# Patient Record
Sex: FEMALE | Race: WHITE | NOT HISPANIC OR LATINO | Employment: OTHER | URBAN - METROPOLITAN AREA
[De-identification: names, ages, dates, MRNs, and addresses within clinical notes are randomized per-mention and may not be internally consistent; named-entity substitution may affect disease eponyms.]

---

## 2017-01-03 ENCOUNTER — COMMUNICATION - HEALTHEAST (OUTPATIENT)
Dept: RADIOLOGY | Facility: CLINIC | Age: 82
End: 2017-01-03

## 2017-01-03 DIAGNOSIS — I10 UNSPECIFIED ESSENTIAL HYPERTENSION: ICD-10-CM

## 2017-01-27 ENCOUNTER — RECORDS - HEALTHEAST (OUTPATIENT)
Dept: ADMINISTRATIVE | Facility: OTHER | Age: 82
End: 2017-01-27

## 2017-03-13 ENCOUNTER — COMMUNICATION - HEALTHEAST (OUTPATIENT)
Dept: INTERNAL MEDICINE | Facility: CLINIC | Age: 82
End: 2017-03-13

## 2017-03-20 ENCOUNTER — OFFICE VISIT - HEALTHEAST (OUTPATIENT)
Dept: INTERNAL MEDICINE | Facility: CLINIC | Age: 82
End: 2017-03-20

## 2017-03-20 DIAGNOSIS — Z01.818 PREOPERATIVE EXAMINATION: ICD-10-CM

## 2017-03-20 DIAGNOSIS — E11.9 TYPE 2 DIABETES MELLITUS (H): ICD-10-CM

## 2017-03-20 DIAGNOSIS — M19.012 OSTEOARTHRITIS OF LEFT SHOULDER, UNSPECIFIED OSTEOARTHRITIS TYPE: ICD-10-CM

## 2017-03-20 LAB — HBA1C MFR BLD: 8.1 % (ref 3.5–6)

## 2017-03-20 ASSESSMENT — MIFFLIN-ST. JEOR: SCORE: 888.61

## 2017-03-21 LAB
ATRIAL RATE - MUSE: 66 BPM
DIASTOLIC BLOOD PRESSURE - MUSE: NORMAL MMHG
INTERPRETATION ECG - MUSE: NORMAL
P AXIS - MUSE: 73 DEGREES
PR INTERVAL - MUSE: 158 MS
QRS DURATION - MUSE: 80 MS
QT - MUSE: 390 MS
QTC - MUSE: 405 MS
R AXIS - MUSE: 73 DEGREES
SYSTOLIC BLOOD PRESSURE - MUSE: NORMAL MMHG
T AXIS - MUSE: 79 DEGREES
VENTRICULAR RATE- MUSE: 65 BPM

## 2017-04-10 ENCOUNTER — OFFICE VISIT - HEALTHEAST (OUTPATIENT)
Dept: INTERNAL MEDICINE | Facility: CLINIC | Age: 82
End: 2017-04-10

## 2017-04-10 DIAGNOSIS — E87.1 HYPONATREMIA: ICD-10-CM

## 2017-04-10 DIAGNOSIS — D72.829 LEUKOCYTOSIS: ICD-10-CM

## 2017-04-10 DIAGNOSIS — Z96.612 S/P REVERSE TOTAL SHOULDER ARTHROPLASTY, LEFT: ICD-10-CM

## 2017-04-10 DIAGNOSIS — R63.0 LOSS OF APPETITE: ICD-10-CM

## 2017-04-10 DIAGNOSIS — R53.83 FATIGUE: ICD-10-CM

## 2017-04-10 DIAGNOSIS — D64.9 ANEMIA: ICD-10-CM

## 2017-04-11 ENCOUNTER — COMMUNICATION - HEALTHEAST (OUTPATIENT)
Dept: INTERNAL MEDICINE | Facility: CLINIC | Age: 82
End: 2017-04-11

## 2017-04-15 ENCOUNTER — HOME CARE/HOSPICE - HEALTHEAST (OUTPATIENT)
Dept: HOME HEALTH SERVICES | Facility: HOME HEALTH | Age: 82
End: 2017-04-15

## 2017-04-16 ENCOUNTER — COMMUNICATION - HEALTHEAST (OUTPATIENT)
Dept: INTERNAL MEDICINE | Facility: CLINIC | Age: 82
End: 2017-04-16

## 2017-04-17 ENCOUNTER — HOME CARE/HOSPICE - HEALTHEAST (OUTPATIENT)
Dept: HOME HEALTH SERVICES | Facility: HOME HEALTH | Age: 82
End: 2017-04-17

## 2017-04-18 ENCOUNTER — OFFICE VISIT - HEALTHEAST (OUTPATIENT)
Dept: INTERNAL MEDICINE | Facility: CLINIC | Age: 82
End: 2017-04-18

## 2017-04-18 ENCOUNTER — HOME CARE/HOSPICE - HEALTHEAST (OUTPATIENT)
Dept: HOME HEALTH SERVICES | Facility: HOME HEALTH | Age: 82
End: 2017-04-18

## 2017-04-18 ENCOUNTER — COMMUNICATION - HEALTHEAST (OUTPATIENT)
Dept: HOME HEALTH SERVICES | Facility: HOME HEALTH | Age: 82
End: 2017-04-18

## 2017-04-18 DIAGNOSIS — D72.829 LEUKOCYTOSIS: ICD-10-CM

## 2017-04-18 DIAGNOSIS — E87.1 HYPONATREMIA: ICD-10-CM

## 2017-04-20 ENCOUNTER — HOME CARE/HOSPICE - HEALTHEAST (OUTPATIENT)
Dept: HOME HEALTH SERVICES | Facility: HOME HEALTH | Age: 82
End: 2017-04-20

## 2017-04-22 ENCOUNTER — HOME CARE/HOSPICE - HEALTHEAST (OUTPATIENT)
Dept: HOME HEALTH SERVICES | Facility: HOME HEALTH | Age: 82
End: 2017-04-22

## 2017-04-24 ENCOUNTER — HOME CARE/HOSPICE - HEALTHEAST (OUTPATIENT)
Dept: HOME HEALTH SERVICES | Facility: HOME HEALTH | Age: 82
End: 2017-04-24

## 2017-04-25 ENCOUNTER — HOME CARE/HOSPICE - HEALTHEAST (OUTPATIENT)
Dept: HOME HEALTH SERVICES | Facility: HOME HEALTH | Age: 82
End: 2017-04-25

## 2017-04-26 ENCOUNTER — HOME CARE/HOSPICE - HEALTHEAST (OUTPATIENT)
Dept: HOME HEALTH SERVICES | Facility: HOME HEALTH | Age: 82
End: 2017-04-26

## 2017-04-26 ENCOUNTER — OFFICE VISIT - HEALTHEAST (OUTPATIENT)
Dept: INTERNAL MEDICINE | Facility: CLINIC | Age: 82
End: 2017-04-26

## 2017-04-26 DIAGNOSIS — D72.829 LEUKOCYTOSIS: ICD-10-CM

## 2017-04-26 DIAGNOSIS — E87.1 HYPONATREMIA: ICD-10-CM

## 2017-04-27 LAB
BASOPHILS # BLD AUTO: 0 THOU/UL (ref 0–0.2)
BASOPHILS NFR BLD AUTO: 0 % (ref 0–2)
EOSINOPHIL # BLD AUTO: 0.2 THOU/UL (ref 0–0.4)
EOSINOPHIL NFR BLD AUTO: 2 % (ref 0–6)
ERYTHROCYTE [DISTWIDTH] IN BLOOD BY AUTOMATED COUNT: 13.6 % (ref 11–14.5)
HCT VFR BLD AUTO: 34.6 % (ref 35–47)
HGB BLD-MCNC: 11.4 G/DL (ref 12–16)
LAB AP CHARGES (HE HISTORICAL CONVERSION): NORMAL
LYMPHOCYTES # BLD AUTO: 2.4 THOU/UL (ref 0.8–4.4)
LYMPHOCYTES NFR BLD AUTO: 21 % (ref 20–40)
MCH RBC QN AUTO: 32.2 PG (ref 27–34)
MCHC RBC AUTO-ENTMCNC: 32.9 G/DL (ref 32–36)
MCV RBC AUTO: 98 FL (ref 80–100)
MONOCYTES # BLD AUTO: 1.5 THOU/UL (ref 0–0.9)
MONOCYTES NFR BLD AUTO: 13 % (ref 2–10)
NEUTROPHILS # BLD AUTO: 7.5 THOU/UL (ref 2–7.7)
NEUTROPHILS NFR BLD AUTO: 64 % (ref 50–70)
PATH REPORT.COMMENTS IMP SPEC: NORMAL
PATH REPORT.COMMENTS IMP SPEC: NORMAL
PATH REPORT.FINAL DX SPEC: NORMAL
PATH REPORT.MICROSCOPIC SPEC OTHER STN: ABNORMAL
PATH REPORT.MICROSCOPIC SPEC OTHER STN: NORMAL
PATH REPORT.RELEVANT HX SPEC: NORMAL
PLATELET # BLD AUTO: 332 THOU/UL (ref 140–440)
PMV BLD AUTO: 12.7 FL (ref 8.5–12.5)
RBC # BLD AUTO: 3.54 MILL/UL (ref 3.8–5.4)
WBC: 11.8 THOU/UL (ref 4–11)

## 2017-04-28 ENCOUNTER — HOME CARE/HOSPICE - HEALTHEAST (OUTPATIENT)
Dept: HOME HEALTH SERVICES | Facility: HOME HEALTH | Age: 82
End: 2017-04-28

## 2017-05-01 ENCOUNTER — HOME CARE/HOSPICE - HEALTHEAST (OUTPATIENT)
Dept: HOME HEALTH SERVICES | Facility: HOME HEALTH | Age: 82
End: 2017-05-01

## 2017-05-02 ENCOUNTER — HOME CARE/HOSPICE - HEALTHEAST (OUTPATIENT)
Dept: HOME HEALTH SERVICES | Facility: HOME HEALTH | Age: 82
End: 2017-05-02

## 2017-05-03 ENCOUNTER — HOME CARE/HOSPICE - HEALTHEAST (OUTPATIENT)
Dept: HOME HEALTH SERVICES | Facility: HOME HEALTH | Age: 82
End: 2017-05-03

## 2017-05-03 ENCOUNTER — COMMUNICATION - HEALTHEAST (OUTPATIENT)
Dept: INTERNAL MEDICINE | Facility: CLINIC | Age: 82
End: 2017-05-03

## 2017-05-03 DIAGNOSIS — E11.9 TYPE 2 DIABETES MELLITUS (H): ICD-10-CM

## 2017-05-08 ENCOUNTER — HOME CARE/HOSPICE - HEALTHEAST (OUTPATIENT)
Dept: HOME HEALTH SERVICES | Facility: HOME HEALTH | Age: 82
End: 2017-05-08

## 2017-05-10 ENCOUNTER — HOME CARE/HOSPICE - HEALTHEAST (OUTPATIENT)
Dept: HOME HEALTH SERVICES | Facility: HOME HEALTH | Age: 82
End: 2017-05-10

## 2017-05-12 ENCOUNTER — HOME CARE/HOSPICE - HEALTHEAST (OUTPATIENT)
Dept: HOME HEALTH SERVICES | Facility: HOME HEALTH | Age: 82
End: 2017-05-12

## 2017-05-16 ENCOUNTER — HOME CARE/HOSPICE - HEALTHEAST (OUTPATIENT)
Dept: HOME HEALTH SERVICES | Facility: HOME HEALTH | Age: 82
End: 2017-05-16

## 2017-05-19 ENCOUNTER — RECORDS - HEALTHEAST (OUTPATIENT)
Dept: ADMINISTRATIVE | Facility: OTHER | Age: 82
End: 2017-05-19

## 2017-07-11 ENCOUNTER — OFFICE VISIT - HEALTHEAST (OUTPATIENT)
Dept: INTERNAL MEDICINE | Facility: CLINIC | Age: 82
End: 2017-07-11

## 2017-07-11 ENCOUNTER — COMMUNICATION - HEALTHEAST (OUTPATIENT)
Dept: TELEHEALTH | Facility: CLINIC | Age: 82
End: 2017-07-11

## 2017-07-11 DIAGNOSIS — E11.9 TYPE 2 DIABETES MELLITUS (H): ICD-10-CM

## 2017-07-11 DIAGNOSIS — R32 URINARY INCONTINENCE: ICD-10-CM

## 2017-07-11 LAB — HBA1C MFR BLD: 7 % (ref 3.5–6)

## 2017-07-13 ENCOUNTER — AMBULATORY - HEALTHEAST (OUTPATIENT)
Dept: INTERNAL MEDICINE | Facility: CLINIC | Age: 82
End: 2017-07-13

## 2017-07-13 ENCOUNTER — COMMUNICATION - HEALTHEAST (OUTPATIENT)
Dept: INTERNAL MEDICINE | Facility: CLINIC | Age: 82
End: 2017-07-13

## 2017-07-13 DIAGNOSIS — R26.81 UNSTEADY GAIT: ICD-10-CM

## 2017-07-25 ENCOUNTER — COMMUNICATION - HEALTHEAST (OUTPATIENT)
Dept: INTERNAL MEDICINE | Facility: CLINIC | Age: 82
End: 2017-07-25

## 2017-07-25 DIAGNOSIS — E78.2 MIXED HYPERLIPIDEMIA: ICD-10-CM

## 2017-08-16 ENCOUNTER — COMMUNICATION - HEALTHEAST (OUTPATIENT)
Dept: INTERNAL MEDICINE | Facility: CLINIC | Age: 82
End: 2017-08-16

## 2017-08-18 ENCOUNTER — COMMUNICATION - HEALTHEAST (OUTPATIENT)
Dept: INTERNAL MEDICINE | Facility: CLINIC | Age: 82
End: 2017-08-18

## 2017-08-20 ENCOUNTER — COMMUNICATION - HEALTHEAST (OUTPATIENT)
Dept: INTERNAL MEDICINE | Facility: CLINIC | Age: 82
End: 2017-08-20

## 2017-08-20 DIAGNOSIS — E11.9 TYPE 2 DIABETES MELLITUS (H): ICD-10-CM

## 2017-09-05 ENCOUNTER — COMMUNICATION - HEALTHEAST (OUTPATIENT)
Dept: INTERNAL MEDICINE | Facility: CLINIC | Age: 82
End: 2017-09-05

## 2017-09-05 DIAGNOSIS — E11.9 TYPE II DIABETES MELLITUS (H): ICD-10-CM

## 2017-09-21 ENCOUNTER — RECORDS - HEALTHEAST (OUTPATIENT)
Dept: ADMINISTRATIVE | Facility: OTHER | Age: 82
End: 2017-09-21

## 2017-10-29 ENCOUNTER — COMMUNICATION - HEALTHEAST (OUTPATIENT)
Dept: INTERNAL MEDICINE | Facility: CLINIC | Age: 82
End: 2017-10-29

## 2017-11-19 ENCOUNTER — COMMUNICATION - HEALTHEAST (OUTPATIENT)
Dept: INTERNAL MEDICINE | Facility: CLINIC | Age: 82
End: 2017-11-19

## 2017-11-19 DIAGNOSIS — E11.9 TYPE 2 DIABETES MELLITUS (H): ICD-10-CM

## 2017-11-21 ENCOUNTER — COMMUNICATION - HEALTHEAST (OUTPATIENT)
Dept: INTERNAL MEDICINE | Facility: CLINIC | Age: 82
End: 2017-11-21

## 2017-11-21 DIAGNOSIS — R32 URINARY INCONTINENCE: ICD-10-CM

## 2017-11-29 ENCOUNTER — AMBULATORY - HEALTHEAST (OUTPATIENT)
Dept: INTERNAL MEDICINE | Facility: CLINIC | Age: 82
End: 2017-11-29

## 2017-11-29 ENCOUNTER — OFFICE VISIT - HEALTHEAST (OUTPATIENT)
Dept: INTERNAL MEDICINE | Facility: CLINIC | Age: 82
End: 2017-11-29

## 2017-11-29 ENCOUNTER — COMMUNICATION - HEALTHEAST (OUTPATIENT)
Dept: INTERNAL MEDICINE | Facility: CLINIC | Age: 82
End: 2017-11-29

## 2017-11-29 DIAGNOSIS — R30.0 DYSURIA: ICD-10-CM

## 2017-11-29 DIAGNOSIS — R73.9 HYPERGLYCEMIA: ICD-10-CM

## 2017-11-29 LAB — HBA1C MFR BLD: 7.5 % (ref 3.5–6)

## 2017-12-01 ENCOUNTER — COMMUNICATION - HEALTHEAST (OUTPATIENT)
Dept: INTERNAL MEDICINE | Facility: CLINIC | Age: 82
End: 2017-12-01

## 2018-01-24 ENCOUNTER — COMMUNICATION - HEALTHEAST (OUTPATIENT)
Dept: INTERNAL MEDICINE | Facility: CLINIC | Age: 83
End: 2018-01-24

## 2018-01-24 DIAGNOSIS — E78.2 MIXED HYPERLIPIDEMIA: ICD-10-CM

## 2018-02-14 ENCOUNTER — COMMUNICATION - HEALTHEAST (OUTPATIENT)
Dept: INTERNAL MEDICINE | Facility: CLINIC | Age: 83
End: 2018-02-14

## 2018-02-14 DIAGNOSIS — E11.9 TYPE 2 DIABETES MELLITUS (H): ICD-10-CM

## 2018-04-26 ENCOUNTER — COMMUNICATION - HEALTHEAST (OUTPATIENT)
Dept: INTERNAL MEDICINE | Facility: CLINIC | Age: 83
End: 2018-04-26

## 2018-04-26 DIAGNOSIS — R32 URINARY INCONTINENCE: ICD-10-CM

## 2018-05-02 ENCOUNTER — COMMUNICATION - HEALTHEAST (OUTPATIENT)
Dept: INTERNAL MEDICINE | Facility: CLINIC | Age: 83
End: 2018-05-02

## 2018-05-02 DIAGNOSIS — I10 ESSENTIAL HYPERTENSION: ICD-10-CM

## 2018-05-02 DIAGNOSIS — E78.2 MIXED HYPERLIPIDEMIA: ICD-10-CM

## 2018-05-16 ENCOUNTER — COMMUNICATION - HEALTHEAST (OUTPATIENT)
Dept: INTERNAL MEDICINE | Facility: CLINIC | Age: 83
End: 2018-05-16

## 2018-05-16 DIAGNOSIS — E11.9 TYPE 2 DIABETES MELLITUS (H): ICD-10-CM

## 2018-05-17 ENCOUNTER — COMMUNICATION - HEALTHEAST (OUTPATIENT)
Dept: INTERNAL MEDICINE | Facility: CLINIC | Age: 83
End: 2018-05-17

## 2018-05-17 DIAGNOSIS — R32 URINARY INCONTINENCE: ICD-10-CM

## 2018-08-16 ENCOUNTER — COMMUNICATION - HEALTHEAST (OUTPATIENT)
Dept: INTERNAL MEDICINE | Facility: CLINIC | Age: 83
End: 2018-08-16

## 2018-08-16 DIAGNOSIS — E11.9 TYPE 2 DIABETES MELLITUS (H): ICD-10-CM

## 2018-08-18 ENCOUNTER — COMMUNICATION - HEALTHEAST (OUTPATIENT)
Dept: INTERNAL MEDICINE | Facility: CLINIC | Age: 83
End: 2018-08-18

## 2018-08-18 DIAGNOSIS — E11.9 TYPE II DIABETES MELLITUS (H): ICD-10-CM

## 2018-09-17 ENCOUNTER — COMMUNICATION - HEALTHEAST (OUTPATIENT)
Dept: INTERNAL MEDICINE | Facility: CLINIC | Age: 83
End: 2018-09-17

## 2018-09-17 DIAGNOSIS — E11.9 TYPE 2 DIABETES MELLITUS (H): ICD-10-CM

## 2018-10-02 ENCOUNTER — COMMUNICATION - HEALTHEAST (OUTPATIENT)
Dept: INTERNAL MEDICINE | Facility: CLINIC | Age: 83
End: 2018-10-02

## 2018-10-02 DIAGNOSIS — I10 ESSENTIAL HYPERTENSION: ICD-10-CM

## 2018-10-12 ENCOUNTER — COMMUNICATION - HEALTHEAST (OUTPATIENT)
Dept: INTERNAL MEDICINE | Facility: CLINIC | Age: 83
End: 2018-10-12

## 2018-10-12 DIAGNOSIS — R32 URINARY INCONTINENCE: ICD-10-CM

## 2018-10-29 ENCOUNTER — COMMUNICATION - HEALTHEAST (OUTPATIENT)
Dept: INTERNAL MEDICINE | Facility: CLINIC | Age: 83
End: 2018-10-29

## 2018-10-29 DIAGNOSIS — E78.2 MIXED HYPERLIPIDEMIA: ICD-10-CM

## 2018-11-05 ENCOUNTER — OFFICE VISIT - HEALTHEAST (OUTPATIENT)
Dept: FAMILY MEDICINE | Facility: CLINIC | Age: 83
End: 2018-11-05

## 2018-11-05 ENCOUNTER — COMMUNICATION - HEALTHEAST (OUTPATIENT)
Dept: SCHEDULING | Facility: CLINIC | Age: 83
End: 2018-11-05

## 2018-11-05 DIAGNOSIS — R30.0 DYSURIA: ICD-10-CM

## 2018-11-05 DIAGNOSIS — N30.90 CYSTITIS: ICD-10-CM

## 2018-11-05 LAB
ALBUMIN UR-MCNC: ABNORMAL MG/DL
APPEARANCE UR: ABNORMAL
BACTERIA #/AREA URNS HPF: ABNORMAL HPF
BILIRUB UR QL STRIP: NEGATIVE
COLOR UR AUTO: YELLOW
GLUCOSE UR STRIP-MCNC: NEGATIVE MG/DL
HGB UR QL STRIP: ABNORMAL
KETONES UR STRIP-MCNC: NEGATIVE MG/DL
LEUKOCYTE ESTERASE UR QL STRIP: ABNORMAL
NITRATE UR QL: NEGATIVE
PH UR STRIP: 7 [PH] (ref 5–8)
RBC #/AREA URNS AUTO: ABNORMAL HPF
SP GR UR STRIP: 1.02 (ref 1–1.03)
SQUAMOUS #/AREA URNS AUTO: ABNORMAL LPF
UROBILINOGEN UR STRIP-ACNC: ABNORMAL
WBC #/AREA URNS AUTO: >100 HPF
WBC CLUMPS #/AREA URNS HPF: PRESENT /[HPF]

## 2018-11-07 LAB — BACTERIA SPEC CULT: ABNORMAL

## 2019-01-03 ENCOUNTER — COMMUNICATION - HEALTHEAST (OUTPATIENT)
Dept: INTERNAL MEDICINE | Facility: CLINIC | Age: 84
End: 2019-01-03

## 2019-01-03 DIAGNOSIS — I10 ESSENTIAL HYPERTENSION: ICD-10-CM

## 2019-02-07 ENCOUNTER — COMMUNICATION - HEALTHEAST (OUTPATIENT)
Dept: INTERNAL MEDICINE | Facility: CLINIC | Age: 84
End: 2019-02-07

## 2019-02-07 DIAGNOSIS — E11.9 TYPE 2 DIABETES MELLITUS (H): ICD-10-CM

## 2019-02-12 ENCOUNTER — OFFICE VISIT - HEALTHEAST (OUTPATIENT)
Dept: INTERNAL MEDICINE | Facility: CLINIC | Age: 84
End: 2019-02-12

## 2019-02-12 ENCOUNTER — HOSPITAL ENCOUNTER (OUTPATIENT)
Dept: LAB | Age: 84
Setting detail: SPECIMEN
Discharge: HOME | End: 2019-02-12

## 2019-02-12 DIAGNOSIS — E11.9 TYPE 2 DIABETES MELLITUS (H): ICD-10-CM

## 2019-02-12 DIAGNOSIS — E78.2 MIXED HYPERLIPIDEMIA: ICD-10-CM

## 2019-02-12 DIAGNOSIS — K90.49 MALABSORPTION DUE TO INTOLERANCE, NOT ELSEWHERE CLASSIFIED: ICD-10-CM

## 2019-02-12 DIAGNOSIS — Z00.00 HEALTH CARE MAINTENANCE: ICD-10-CM

## 2019-02-12 DIAGNOSIS — Z00.00 ENCOUNTER FOR MEDICARE ANNUAL WELLNESS EXAM: ICD-10-CM

## 2019-02-12 DIAGNOSIS — R63.0 ANOREXIA: ICD-10-CM

## 2019-02-12 DIAGNOSIS — N95.1 SYMPTOMATIC MENOPAUSAL OR FEMALE CLIMACTERIC STATES: ICD-10-CM

## 2019-02-12 DIAGNOSIS — D12.6 BENIGN NEOPLASM OF COLON: ICD-10-CM

## 2019-02-12 DIAGNOSIS — Z12.31 VISIT FOR SCREENING MAMMOGRAM: ICD-10-CM

## 2019-02-12 DIAGNOSIS — I10 ESSENTIAL HYPERTENSION: ICD-10-CM

## 2019-02-12 LAB
ALBUMIN SERPL-MCNC: 4.3 G/DL (ref 3.5–5)
ALP SERPL-CCNC: 111 U/L (ref 45–120)
ALT SERPL W P-5'-P-CCNC: 20 U/L (ref 0–45)
ANION GAP SERPL CALCULATED.3IONS-SCNC: 16 MMOL/L (ref 5–18)
AST SERPL W P-5'-P-CCNC: 23 U/L (ref 0–40)
BILIRUB SERPL-MCNC: 0.7 MG/DL (ref 0–1)
BUN SERPL-MCNC: 17 MG/DL (ref 8–28)
CALCIUM SERPL-MCNC: 9.6 MG/DL (ref 8.5–10.5)
CHLORIDE BLD-SCNC: 98 MMOL/L (ref 98–107)
CO2 SERPL-SCNC: 23 MMOL/L (ref 22–31)
CREAT SERPL-MCNC: 1.04 MG/DL (ref 0.6–1.1)
CREAT UR-MCNC: 78.6 MG/DL
ERYTHROCYTE [DISTWIDTH] IN BLOOD BY AUTOMATED COUNT: 11.5 % (ref 11–14.5)
GFR SERPL CREATININE-BSD FRML MDRD: 51 ML/MIN/1.73M2
GLUCOSE BLD-MCNC: 224 MG/DL (ref 70–125)
HBA1C MFR BLD: 8.8 % (ref 3.5–6)
HCT VFR BLD AUTO: 34.3 % (ref 35–47)
HGB BLD-MCNC: 11.4 G/DL (ref 12–16)
LDLC SERPL CALC-MCNC: 55 MG/DL
MCH RBC QN AUTO: 32.2 PG (ref 27–34)
MCHC RBC AUTO-ENTMCNC: 33.4 G/DL (ref 32–36)
MCV RBC AUTO: 97 FL (ref 80–100)
MICROALBUMIN UR-MCNC: 13.27 MG/DL (ref 0–1.99)
MICROALBUMIN/CREAT UR: 168.8 MG/G
PLATELET # BLD AUTO: 298 THOU/UL (ref 140–440)
PMV BLD AUTO: 9 FL (ref 7–10)
POTASSIUM BLD-SCNC: 4.3 MMOL/L (ref 3.5–5)
PROT SERPL-MCNC: 7.8 G/DL (ref 6–8)
RBC # BLD AUTO: 3.55 MILL/UL (ref 3.8–5.4)
SODIUM SERPL-SCNC: 137 MMOL/L (ref 136–145)
WBC: 11.6 THOU/UL (ref 4–11)

## 2019-02-13 ENCOUNTER — COMMUNICATION - HEALTHEAST (OUTPATIENT)
Dept: INTERNAL MEDICINE | Facility: CLINIC | Age: 84
End: 2019-02-13

## 2019-02-13 LAB — 25(OH)D3 SERPL-MCNC: 32.6 NG/ML (ref 30–80)

## 2019-03-14 ENCOUNTER — HOSPITAL ENCOUNTER (OUTPATIENT)
Dept: LAB | Age: 84
Setting detail: SPECIMEN
Discharge: HOME | End: 2019-03-14

## 2019-03-14 ENCOUNTER — RECORDS - HEALTHEAST (OUTPATIENT)
Dept: GENERAL RADIOLOGY | Facility: CLINIC | Age: 84
End: 2019-03-14

## 2019-03-14 ENCOUNTER — OFFICE VISIT - HEALTHEAST (OUTPATIENT)
Dept: FAMILY MEDICINE | Facility: CLINIC | Age: 84
End: 2019-03-14

## 2019-03-14 DIAGNOSIS — J11.1 INFLUENZA-LIKE SYNDROME: ICD-10-CM

## 2019-03-14 DIAGNOSIS — R06.00 DYSPNEA, UNSPECIFIED TYPE: ICD-10-CM

## 2019-03-14 DIAGNOSIS — E87.1 HYPONATREMIA: ICD-10-CM

## 2019-03-14 DIAGNOSIS — R73.9 HYPERGLYCEMIA: ICD-10-CM

## 2019-03-14 DIAGNOSIS — J11.1 INFLUENZA DUE TO UNIDENTIFIED INFLUENZA VIRUS WITH OTHER RESPIRATORY MANIFESTATIONS: ICD-10-CM

## 2019-03-14 DIAGNOSIS — D72.829 LEUKOCYTOSIS, UNSPECIFIED TYPE: ICD-10-CM

## 2019-03-14 DIAGNOSIS — D64.9 ANEMIA, UNSPECIFIED TYPE: ICD-10-CM

## 2019-03-14 LAB
ALBUMIN UR-MCNC: ABNORMAL MG/DL
ANION GAP SERPL CALCULATED.3IONS-SCNC: 13 MMOL/L (ref 5–18)
APPEARANCE UR: CLEAR
BACTERIA #/AREA URNS HPF: ABNORMAL HPF
BASOPHILS # BLD AUTO: 0.1 THOU/UL (ref 0–0.2)
BASOPHILS NFR BLD AUTO: 1 % (ref 0–2)
BILIRUB UR QL STRIP: NEGATIVE
BUN SERPL-MCNC: 25 MG/DL (ref 8–28)
C REACTIVE PROTEIN LHE: 0.9 MG/DL (ref 0–0.8)
CHLORIDE BLD-SCNC: 95 MMOL/L (ref 98–107)
CO2 SERPL-SCNC: 26 MMOL/L (ref 22–31)
COLOR UR AUTO: YELLOW
CREAT SERPL-MCNC: 0.9 MG/DL (ref 0.7–1.3)
EOSINOPHIL # BLD AUTO: 0.2 THOU/UL (ref 0–0.4)
EOSINOPHIL NFR BLD AUTO: 1 % (ref 0–6)
ERYTHROCYTE [DISTWIDTH] IN BLOOD BY AUTOMATED COUNT: 12.2 % (ref 11–14.5)
GLUCOSE BLD-MCNC: 284 MG/DL (ref 70–125)
GLUCOSE UR STRIP-MCNC: ABNORMAL MG/DL
HCT VFR BLD AUTO: 33.5 % (ref 35–47)
HGB BLD-MCNC: 11.1 G/DL (ref 12–16)
HGB UR QL STRIP: ABNORMAL
KETONES UR STRIP-MCNC: NEGATIVE MG/DL
LEUKOCYTE ESTERASE UR QL STRIP: ABNORMAL
LYMPHOCYTES # BLD AUTO: 3.1 THOU/UL (ref 0.8–4.4)
LYMPHOCYTES NFR BLD AUTO: 18 % (ref 20–40)
MCH RBC QN AUTO: 32.4 PG (ref 27–34)
MCHC RBC AUTO-ENTMCNC: 33.1 G/DL (ref 32–36)
MCV RBC AUTO: 98 FL (ref 80–100)
MONOCYTES # BLD AUTO: 1.7 THOU/UL (ref 0–0.9)
MONOCYTES NFR BLD AUTO: 10 % (ref 2–10)
NEUTROPHILS # BLD AUTO: 11.7 THOU/UL (ref 2–7.7)
NEUTROPHILS NFR BLD AUTO: 71 % (ref 50–70)
NITRATE UR QL: NEGATIVE
PH UR STRIP: 6 [PH] (ref 5–8)
PLATELET # BLD AUTO: 465 THOU/UL (ref 140–440)
PMV BLD AUTO: 11.4 FL (ref 8.5–12.5)
POTASSIUM BLD-SCNC: 4.5 MMOL/L (ref 3.5–5.5)
RBC # BLD AUTO: 3.43 MILL/UL (ref 3.8–5.4)
RBC #/AREA URNS AUTO: ABNORMAL HPF
SODIUM SERPL-SCNC: 134 MMOL/L (ref 136–145)
SP GR UR STRIP: 1.02 (ref 1–1.03)
SQUAMOUS #/AREA URNS AUTO: ABNORMAL LPF
UROBILINOGEN UR STRIP-ACNC: ABNORMAL
WBC #/AREA URNS AUTO: ABNORMAL HPF
WBC: 17.4 THOU/UL (ref 4–11)

## 2019-03-15 ENCOUNTER — COMMUNICATION - HEALTHEAST (OUTPATIENT)
Dept: TELEHEALTH | Facility: CLINIC | Age: 84
End: 2019-03-15

## 2019-03-15 ENCOUNTER — OFFICE VISIT - HEALTHEAST (OUTPATIENT)
Dept: INTERNAL MEDICINE | Facility: CLINIC | Age: 84
End: 2019-03-15

## 2019-03-15 DIAGNOSIS — D72.825 BANDEMIA: ICD-10-CM

## 2019-03-15 LAB — BACTERIA SPEC CULT: NO GROWTH

## 2019-03-19 LAB
AEROBIC BLOOD CULTURE BOTTLE: NO GROWTH
ANAEROBIC BLOOD CULTURE BOTTLE: NORMAL

## 2019-03-20 ENCOUNTER — OFFICE VISIT - HEALTHEAST (OUTPATIENT)
Dept: INTERNAL MEDICINE | Facility: CLINIC | Age: 84
End: 2019-03-20

## 2019-03-20 ENCOUNTER — COMMUNICATION - HEALTHEAST (OUTPATIENT)
Dept: FAMILY MEDICINE | Facility: CLINIC | Age: 84
End: 2019-03-20

## 2019-03-20 DIAGNOSIS — D72.829 LEUKOCYTOSIS, UNSPECIFIED TYPE: ICD-10-CM

## 2019-03-20 LAB
ERYTHROCYTE [DISTWIDTH] IN BLOOD BY AUTOMATED COUNT: 11 % (ref 11–14.5)
HCT VFR BLD AUTO: 32.5 % (ref 35–47)
HGB BLD-MCNC: 10.9 G/DL (ref 12–16)
MCH RBC QN AUTO: 31.8 PG (ref 27–34)
MCHC RBC AUTO-ENTMCNC: 33.5 G/DL (ref 32–36)
MCV RBC AUTO: 95 FL (ref 80–100)
PLATELET # BLD AUTO: 387 THOU/UL (ref 140–440)
PMV BLD AUTO: 9 FL (ref 7–10)
RBC # BLD AUTO: 3.42 MILL/UL (ref 3.8–5.4)
WBC: 15.3 THOU/UL (ref 4–11)

## 2019-03-21 ENCOUNTER — COMMUNICATION - HEALTHEAST (OUTPATIENT)
Dept: INTERNAL MEDICINE | Facility: CLINIC | Age: 84
End: 2019-03-21

## 2019-04-04 ENCOUNTER — COMMUNICATION - HEALTHEAST (OUTPATIENT)
Dept: INTERNAL MEDICINE | Facility: CLINIC | Age: 84
End: 2019-04-04

## 2019-04-04 DIAGNOSIS — I10 ESSENTIAL HYPERTENSION: ICD-10-CM

## 2019-05-02 ENCOUNTER — COMMUNICATION - HEALTHEAST (OUTPATIENT)
Dept: INTERNAL MEDICINE | Facility: CLINIC | Age: 84
End: 2019-05-02

## 2019-05-02 DIAGNOSIS — I10 ESSENTIAL HYPERTENSION: ICD-10-CM

## 2019-07-14 ENCOUNTER — COMMUNICATION - HEALTHEAST (OUTPATIENT)
Dept: INTERNAL MEDICINE | Facility: CLINIC | Age: 84
End: 2019-07-14

## 2019-07-14 DIAGNOSIS — R32 URINARY INCONTINENCE: ICD-10-CM

## 2019-07-17 ENCOUNTER — OFFICE VISIT - HEALTHEAST (OUTPATIENT)
Dept: INTERNAL MEDICINE | Facility: CLINIC | Age: 84
End: 2019-07-17

## 2019-07-17 DIAGNOSIS — Z01.84 IMMUNITY STATUS TESTING: ICD-10-CM

## 2019-07-17 DIAGNOSIS — R26.81 UNSTEADY GAIT: ICD-10-CM

## 2019-07-17 DIAGNOSIS — N32.81 OVERACTIVE BLADDER: ICD-10-CM

## 2019-07-17 ASSESSMENT — MIFFLIN-ST. JEOR: SCORE: 861.85

## 2019-07-19 LAB
MEV IGG SER IA-ACNC: POSITIVE
VZV IGG SER QL IA: POSITIVE

## 2019-07-30 ENCOUNTER — COMMUNICATION - HEALTHEAST (OUTPATIENT)
Dept: INTERNAL MEDICINE | Facility: CLINIC | Age: 84
End: 2019-07-30

## 2019-07-30 DIAGNOSIS — E78.2 MIXED HYPERLIPIDEMIA: ICD-10-CM

## 2019-08-02 ENCOUNTER — OFFICE VISIT - HEALTHEAST (OUTPATIENT)
Dept: PHYSICAL THERAPY | Facility: REHABILITATION | Age: 84
End: 2019-08-02

## 2019-08-02 DIAGNOSIS — R26.81 UNSTEADINESS ON FEET: ICD-10-CM

## 2019-08-02 DIAGNOSIS — M62.81 GENERALIZED MUSCLE WEAKNESS: ICD-10-CM

## 2019-08-06 ENCOUNTER — OFFICE VISIT - HEALTHEAST (OUTPATIENT)
Dept: PHYSICAL THERAPY | Facility: REHABILITATION | Age: 84
End: 2019-08-06

## 2019-08-06 DIAGNOSIS — M62.81 GENERALIZED MUSCLE WEAKNESS: ICD-10-CM

## 2019-08-06 DIAGNOSIS — R26.81 UNSTEADINESS ON FEET: ICD-10-CM

## 2019-08-09 ENCOUNTER — OFFICE VISIT - HEALTHEAST (OUTPATIENT)
Dept: PHYSICAL THERAPY | Facility: REHABILITATION | Age: 84
End: 2019-08-09

## 2019-08-09 DIAGNOSIS — M62.81 GENERALIZED MUSCLE WEAKNESS: ICD-10-CM

## 2019-08-09 DIAGNOSIS — R26.81 UNSTEADINESS ON FEET: ICD-10-CM

## 2019-08-13 ENCOUNTER — OFFICE VISIT - HEALTHEAST (OUTPATIENT)
Dept: PHYSICAL THERAPY | Facility: REHABILITATION | Age: 84
End: 2019-08-13

## 2019-08-13 DIAGNOSIS — R26.81 UNSTEADINESS ON FEET: ICD-10-CM

## 2019-08-13 DIAGNOSIS — M62.81 GENERALIZED MUSCLE WEAKNESS: ICD-10-CM

## 2019-08-16 ENCOUNTER — OFFICE VISIT - HEALTHEAST (OUTPATIENT)
Dept: PHYSICAL THERAPY | Facility: REHABILITATION | Age: 84
End: 2019-08-16

## 2019-08-16 DIAGNOSIS — M62.81 GENERALIZED MUSCLE WEAKNESS: ICD-10-CM

## 2019-08-16 DIAGNOSIS — R26.81 UNSTEADINESS ON FEET: ICD-10-CM

## 2019-08-20 ENCOUNTER — OFFICE VISIT - HEALTHEAST (OUTPATIENT)
Dept: PHYSICAL THERAPY | Facility: REHABILITATION | Age: 84
End: 2019-08-20

## 2019-08-20 DIAGNOSIS — R26.81 UNSTEADINESS ON FEET: ICD-10-CM

## 2019-08-20 DIAGNOSIS — M62.81 GENERALIZED MUSCLE WEAKNESS: ICD-10-CM

## 2019-08-23 ENCOUNTER — OFFICE VISIT - HEALTHEAST (OUTPATIENT)
Dept: PHYSICAL THERAPY | Facility: REHABILITATION | Age: 84
End: 2019-08-23

## 2019-08-23 DIAGNOSIS — R26.81 UNSTEADINESS ON FEET: ICD-10-CM

## 2019-08-23 DIAGNOSIS — M62.81 GENERALIZED MUSCLE WEAKNESS: ICD-10-CM

## 2019-08-27 ENCOUNTER — OFFICE VISIT - HEALTHEAST (OUTPATIENT)
Dept: PHYSICAL THERAPY | Facility: REHABILITATION | Age: 84
End: 2019-08-27

## 2019-08-27 DIAGNOSIS — M62.81 GENERALIZED MUSCLE WEAKNESS: ICD-10-CM

## 2019-08-27 DIAGNOSIS — R26.81 UNSTEADINESS ON FEET: ICD-10-CM

## 2019-09-05 ENCOUNTER — OFFICE VISIT - HEALTHEAST (OUTPATIENT)
Dept: PHYSICAL THERAPY | Facility: REHABILITATION | Age: 84
End: 2019-09-05

## 2019-09-05 DIAGNOSIS — M62.81 GENERALIZED MUSCLE WEAKNESS: ICD-10-CM

## 2019-09-05 DIAGNOSIS — R26.81 UNSTEADINESS ON FEET: ICD-10-CM

## 2019-09-16 ENCOUNTER — OFFICE VISIT - HEALTHEAST (OUTPATIENT)
Dept: PHYSICAL THERAPY | Facility: REHABILITATION | Age: 84
End: 2019-09-16

## 2019-09-16 DIAGNOSIS — R26.81 UNSTEADINESS ON FEET: ICD-10-CM

## 2019-09-16 DIAGNOSIS — M62.81 GENERALIZED MUSCLE WEAKNESS: ICD-10-CM

## 2019-09-26 ENCOUNTER — OFFICE VISIT - HEALTHEAST (OUTPATIENT)
Dept: PHYSICAL THERAPY | Facility: REHABILITATION | Age: 84
End: 2019-09-26

## 2019-09-26 DIAGNOSIS — R26.81 UNSTEADINESS ON FEET: ICD-10-CM

## 2019-09-26 DIAGNOSIS — M62.81 GENERALIZED MUSCLE WEAKNESS: ICD-10-CM

## 2019-11-22 ENCOUNTER — OFFICE VISIT - HEALTHEAST (OUTPATIENT)
Dept: INTERNAL MEDICINE | Facility: CLINIC | Age: 84
End: 2019-11-22

## 2019-11-22 DIAGNOSIS — I10 ESSENTIAL HYPERTENSION: ICD-10-CM

## 2019-11-22 DIAGNOSIS — R26.81 UNSTEADY GAIT: ICD-10-CM

## 2019-11-22 DIAGNOSIS — E11.9 TYPE 2 DIABETES MELLITUS WITHOUT COMPLICATION, WITHOUT LONG-TERM CURRENT USE OF INSULIN (H): ICD-10-CM

## 2019-11-22 DIAGNOSIS — Z78.0 MENOPAUSE: ICD-10-CM

## 2019-11-22 DIAGNOSIS — E78.2 MIXED HYPERLIPIDEMIA: ICD-10-CM

## 2019-11-22 LAB
ALBUMIN SERPL-MCNC: 4.5 G/DL (ref 3.5–5)
ALP SERPL-CCNC: 67 U/L (ref 45–120)
ALT SERPL W P-5'-P-CCNC: 11 U/L (ref 0–45)
ANION GAP SERPL CALCULATED.3IONS-SCNC: 17 MMOL/L (ref 5–18)
AST SERPL W P-5'-P-CCNC: 18 U/L (ref 0–40)
BILIRUB SERPL-MCNC: 0.8 MG/DL (ref 0–1)
BUN SERPL-MCNC: 17 MG/DL (ref 8–28)
CALCIUM SERPL-MCNC: 9.6 MG/DL (ref 8.5–10.5)
CHLORIDE BLD-SCNC: 103 MMOL/L (ref 98–107)
CO2 SERPL-SCNC: 18 MMOL/L (ref 22–31)
CREAT SERPL-MCNC: 0.9 MG/DL (ref 0.6–1.1)
CREAT UR-MCNC: 100.5 MG/DL
ERYTHROCYTE [DISTWIDTH] IN BLOOD BY AUTOMATED COUNT: 12.2 % (ref 11–14.5)
GFR SERPL CREATININE-BSD FRML MDRD: 60 ML/MIN/1.73M2
GLUCOSE BLD-MCNC: 113 MG/DL (ref 70–125)
HBA1C MFR BLD: 7 % (ref 3.5–6)
HCT VFR BLD AUTO: 38.5 % (ref 35–47)
HGB BLD-MCNC: 12.8 G/DL (ref 12–16)
MCH RBC QN AUTO: 28.1 PG (ref 27–34)
MCHC RBC AUTO-ENTMCNC: 33.3 G/DL (ref 32–36)
MCV RBC AUTO: 84 FL (ref 80–100)
MICROALBUMIN UR-MCNC: 11.09 MG/DL (ref 0–1.99)
MICROALBUMIN/CREAT UR: 110.3 MG/G
PLATELET # BLD AUTO: 262 THOU/UL (ref 140–440)
PMV BLD AUTO: 7.9 FL (ref 7–10)
POTASSIUM BLD-SCNC: 4.5 MMOL/L (ref 3.5–5)
PROT SERPL-MCNC: 7.5 G/DL (ref 6–8)
RBC # BLD AUTO: 4.56 MILL/UL (ref 3.8–5.4)
SODIUM SERPL-SCNC: 138 MMOL/L (ref 136–145)
TSH SERPL DL<=0.005 MIU/L-ACNC: 1.2 UIU/ML (ref 0.3–5)
VIT B12 SERPL-MCNC: 339 PG/ML (ref 213–816)
WBC: 11.5 THOU/UL (ref 4–11)

## 2019-11-22 ASSESSMENT — MIFFLIN-ST. JEOR: SCORE: 866.38

## 2019-11-25 ENCOUNTER — COMMUNICATION - HEALTHEAST (OUTPATIENT)
Dept: INTERNAL MEDICINE | Facility: CLINIC | Age: 84
End: 2019-11-25

## 2020-01-02 ENCOUNTER — HOSPITAL ENCOUNTER (OUTPATIENT)
Dept: MAMMOGRAPHY | Facility: CLINIC | Age: 85
Discharge: HOME | End: 2020-01-02

## 2020-01-02 DIAGNOSIS — Z12.31 VISIT FOR SCREENING MAMMOGRAM: ICD-10-CM

## 2020-01-09 ENCOUNTER — RECORDS - HEALTHEAST (OUTPATIENT)
Dept: BONE DENSITY | Facility: CLINIC | Age: 85
End: 2020-01-09

## 2020-01-09 ENCOUNTER — RECORDS - HEALTHEAST (OUTPATIENT)
Dept: ADMINISTRATIVE | Facility: OTHER | Age: 85
End: 2020-01-09

## 2020-01-09 DIAGNOSIS — Z78.0 ASYMPTOMATIC MENOPAUSAL STATE: ICD-10-CM

## 2020-01-29 ENCOUNTER — COMMUNICATION - HEALTHEAST (OUTPATIENT)
Dept: INTERNAL MEDICINE | Facility: CLINIC | Age: 85
End: 2020-01-29

## 2020-01-29 DIAGNOSIS — E11.9 TYPE 2 DIABETES MELLITUS (H): ICD-10-CM

## 2020-02-27 ENCOUNTER — OFFICE VISIT - HEALTHEAST (OUTPATIENT)
Dept: INTERNAL MEDICINE | Facility: CLINIC | Age: 85
End: 2020-02-27

## 2020-02-27 DIAGNOSIS — M81.0 OSTEOPOROSIS WITHOUT CURRENT PATHOLOGICAL FRACTURE, UNSPECIFIED OSTEOPOROSIS TYPE: ICD-10-CM

## 2020-02-27 DIAGNOSIS — E78.2 MIXED HYPERLIPIDEMIA: ICD-10-CM

## 2020-02-27 DIAGNOSIS — I10 ESSENTIAL HYPERTENSION: ICD-10-CM

## 2020-02-27 DIAGNOSIS — Z00.00 WELLNESS EXAMINATION: ICD-10-CM

## 2020-02-27 DIAGNOSIS — R41.3 MEMORY LOSS: ICD-10-CM

## 2020-02-27 DIAGNOSIS — E11.9 TYPE 2 DIABETES MELLITUS (H): ICD-10-CM

## 2020-02-27 LAB
CHOLEST SERPL-MCNC: 119 MG/DL
CREAT UR-MCNC: 77.9 MG/DL
FASTING STATUS PATIENT QL REPORTED: YES
HBA1C MFR BLD: 6.6 % (ref 3.5–6)
HDLC SERPL-MCNC: 37 MG/DL
LDLC SERPL CALC-MCNC: 56 MG/DL
MICROALBUMIN UR-MCNC: 9.65 MG/DL (ref 0–1.99)
MICROALBUMIN/CREAT UR: 123.9 MG/G
TRIGL SERPL-MCNC: 131 MG/DL
TSH SERPL DL<=0.005 MIU/L-ACNC: 1.75 UIU/ML (ref 0.3–5)
VIT B12 SERPL-MCNC: 316 PG/ML (ref 213–816)

## 2020-02-27 ASSESSMENT — MIFFLIN-ST. JEOR: SCORE: 870.92

## 2020-02-28 LAB
25(OH)D3 SERPL-MCNC: 34.4 NG/ML (ref 30–80)
25(OH)D3 SERPL-MCNC: 34.4 NG/ML (ref 30–80)

## 2020-03-12 ENCOUNTER — RECORDS - HEALTHEAST (OUTPATIENT)
Dept: ADMINISTRATIVE | Facility: OTHER | Age: 85
End: 2020-03-12

## 2020-10-15 ENCOUNTER — COMMUNICATION - HEALTHEAST (OUTPATIENT)
Dept: INTERNAL MEDICINE | Facility: CLINIC | Age: 85
End: 2020-10-15

## 2020-10-15 DIAGNOSIS — E78.2 MIXED HYPERLIPIDEMIA: ICD-10-CM

## 2020-10-16 ENCOUNTER — COMMUNICATION - HEALTHEAST (OUTPATIENT)
Dept: INTERNAL MEDICINE | Facility: CLINIC | Age: 85
End: 2020-10-16

## 2020-10-16 DIAGNOSIS — E78.2 MIXED HYPERLIPIDEMIA: ICD-10-CM

## 2020-10-19 ENCOUNTER — COMMUNICATION - HEALTHEAST (OUTPATIENT)
Dept: INTERNAL MEDICINE | Facility: CLINIC | Age: 85
End: 2020-10-19

## 2020-10-19 DIAGNOSIS — E78.2 MIXED HYPERLIPIDEMIA: ICD-10-CM

## 2020-10-20 ENCOUNTER — OFFICE VISIT - HEALTHEAST (OUTPATIENT)
Dept: INTERNAL MEDICINE | Facility: CLINIC | Age: 85
End: 2020-10-20

## 2020-10-20 DIAGNOSIS — Z23 NEEDS FLU SHOT: ICD-10-CM

## 2020-10-20 DIAGNOSIS — I10 ESSENTIAL HYPERTENSION: ICD-10-CM

## 2020-10-20 DIAGNOSIS — M54.9 CHRONIC MIDLINE BACK PAIN, UNSPECIFIED BACK LOCATION: ICD-10-CM

## 2020-10-20 DIAGNOSIS — R53.83 FATIGUE, UNSPECIFIED TYPE: ICD-10-CM

## 2020-10-20 DIAGNOSIS — G89.29 CHRONIC MIDLINE BACK PAIN, UNSPECIFIED BACK LOCATION: ICD-10-CM

## 2020-10-20 DIAGNOSIS — E11.9 TYPE 2 DIABETES MELLITUS WITHOUT COMPLICATION, WITHOUT LONG-TERM CURRENT USE OF INSULIN (H): ICD-10-CM

## 2020-10-20 LAB
ALBUMIN SERPL-MCNC: 4 G/DL (ref 3.5–5)
ALP SERPL-CCNC: 132 U/L (ref 45–120)
ALT SERPL W P-5'-P-CCNC: 10 U/L (ref 0–45)
ANION GAP SERPL CALCULATED.3IONS-SCNC: 12 MMOL/L (ref 5–18)
AST SERPL W P-5'-P-CCNC: 15 U/L (ref 0–40)
BILIRUB SERPL-MCNC: 0.5 MG/DL (ref 0–1)
BUN SERPL-MCNC: 21 MG/DL (ref 8–28)
CALCIUM SERPL-MCNC: 8.9 MG/DL (ref 8.5–10.5)
CHLORIDE BLD-SCNC: 99 MMOL/L (ref 98–107)
CO2 SERPL-SCNC: 23 MMOL/L (ref 22–31)
CREAT SERPL-MCNC: 0.98 MG/DL (ref 0.6–1.1)
ERYTHROCYTE [DISTWIDTH] IN BLOOD BY AUTOMATED COUNT: 10.5 % (ref 11–14.5)
GFR SERPL CREATININE-BSD FRML MDRD: 54 ML/MIN/1.73M2
GLUCOSE BLD-MCNC: 165 MG/DL (ref 70–125)
HBA1C MFR BLD: 6.6 %
HCT VFR BLD AUTO: 32.7 % (ref 35–47)
HGB BLD-MCNC: 11 G/DL (ref 12–16)
MCH RBC QN AUTO: 33.4 PG (ref 27–34)
MCHC RBC AUTO-ENTMCNC: 33.5 G/DL (ref 32–36)
MCV RBC AUTO: 100 FL (ref 80–100)
PLATELET # BLD AUTO: 464 THOU/UL (ref 140–440)
PMV BLD AUTO: 9.9 FL (ref 7–10)
POTASSIUM BLD-SCNC: 5.1 MMOL/L (ref 3.5–5)
PROT SERPL-MCNC: 7 G/DL (ref 6–8)
RBC # BLD AUTO: 3.27 MILL/UL (ref 3.8–5.4)
SODIUM SERPL-SCNC: 134 MMOL/L (ref 136–145)
TSH SERPL DL<=0.005 MIU/L-ACNC: 1.13 UIU/ML (ref 0.3–5)
VIT B12 SERPL-MCNC: 804 PG/ML (ref 213–816)
WBC: 10.4 THOU/UL (ref 4–11)

## 2020-10-20 ASSESSMENT — MIFFLIN-ST. JEOR: SCORE: 843.7

## 2020-10-23 ENCOUNTER — AMBULATORY - HEALTHEAST (OUTPATIENT)
Dept: LAB | Facility: CLINIC | Age: 85
End: 2020-10-23

## 2020-10-23 DIAGNOSIS — M54.9 CHRONIC MIDLINE BACK PAIN, UNSPECIFIED BACK LOCATION: ICD-10-CM

## 2020-10-23 DIAGNOSIS — G89.29 CHRONIC MIDLINE BACK PAIN, UNSPECIFIED BACK LOCATION: ICD-10-CM

## 2020-10-23 DIAGNOSIS — R53.83 FATIGUE, UNSPECIFIED TYPE: ICD-10-CM

## 2020-10-23 LAB
ALBUMIN UR-MCNC: NEGATIVE MG/DL
APPEARANCE UR: ABNORMAL
BACTERIA #/AREA URNS HPF: ABNORMAL HPF
BILIRUB UR QL STRIP: NEGATIVE
COLOR UR AUTO: YELLOW
GLUCOSE UR STRIP-MCNC: NEGATIVE MG/DL
HGB UR QL STRIP: NEGATIVE
KETONES UR STRIP-MCNC: NEGATIVE MG/DL
LEUKOCYTE ESTERASE UR QL STRIP: ABNORMAL
NITRATE UR QL: NEGATIVE
PH UR STRIP: 5.5 [PH] (ref 5–8)
RBC #/AREA URNS AUTO: ABNORMAL HPF
SP GR UR STRIP: 1.02 (ref 1–1.03)
SQUAMOUS #/AREA URNS AUTO: ABNORMAL LPF
UROBILINOGEN UR STRIP-ACNC: ABNORMAL
WBC #/AREA URNS AUTO: ABNORMAL HPF

## 2020-10-24 LAB — BACTERIA SPEC CULT: NO GROWTH

## 2020-12-10 ENCOUNTER — COMMUNICATION - HEALTHEAST (OUTPATIENT)
Dept: INTERNAL MEDICINE | Facility: CLINIC | Age: 85
End: 2020-12-10

## 2020-12-15 ENCOUNTER — OFFICE VISIT - HEALTHEAST (OUTPATIENT)
Dept: INTERNAL MEDICINE | Facility: CLINIC | Age: 85
End: 2020-12-15

## 2020-12-15 DIAGNOSIS — I10 ESSENTIAL HYPERTENSION: ICD-10-CM

## 2020-12-15 DIAGNOSIS — R60.9 EDEMA, UNSPECIFIED TYPE: ICD-10-CM

## 2020-12-15 LAB
ANION GAP SERPL CALCULATED.3IONS-SCNC: 13 MMOL/L (ref 5–18)
BUN SERPL-MCNC: 17 MG/DL (ref 8–28)
CALCIUM SERPL-MCNC: 8.7 MG/DL (ref 8.5–10.5)
CHLORIDE BLD-SCNC: 106 MMOL/L (ref 98–107)
CO2 SERPL-SCNC: 23 MMOL/L (ref 22–31)
CREAT SERPL-MCNC: 0.99 MG/DL (ref 0.6–1.1)
ERYTHROCYTE [DISTWIDTH] IN BLOOD BY AUTOMATED COUNT: 11.4 % (ref 11–14.5)
GFR SERPL CREATININE-BSD FRML MDRD: 54 ML/MIN/1.73M2
GLUCOSE BLD-MCNC: 132 MG/DL (ref 70–125)
HCT VFR BLD AUTO: 32 % (ref 35–47)
HGB BLD-MCNC: 11 G/DL (ref 12–16)
MCH RBC QN AUTO: 34.8 PG (ref 27–34)
MCHC RBC AUTO-ENTMCNC: 34.3 G/DL (ref 32–36)
MCV RBC AUTO: 101 FL (ref 80–100)
PLATELET # BLD AUTO: 199 THOU/UL (ref 140–440)
PMV BLD AUTO: 10 FL (ref 7–10)
POTASSIUM BLD-SCNC: 4.5 MMOL/L (ref 3.5–5)
RBC # BLD AUTO: 3.16 MILL/UL (ref 3.8–5.4)
SODIUM SERPL-SCNC: 142 MMOL/L (ref 136–145)
WBC: 11.5 THOU/UL (ref 4–11)

## 2020-12-15 ASSESSMENT — MIFFLIN-ST. JEOR: SCORE: 875.46

## 2020-12-29 ENCOUNTER — OFFICE VISIT - HEALTHEAST (OUTPATIENT)
Dept: INTERNAL MEDICINE | Facility: CLINIC | Age: 85
End: 2020-12-29

## 2020-12-29 DIAGNOSIS — G47.9 SLEEP DISORDER: ICD-10-CM

## 2020-12-29 DIAGNOSIS — R41.3 MEMORY LOSS: ICD-10-CM

## 2020-12-29 ASSESSMENT — MIFFLIN-ST. JEOR: SCORE: 857.31

## 2021-01-01 ENCOUNTER — RECORDS - HEALTHEAST (OUTPATIENT)
Dept: ADMINISTRATIVE | Facility: CLINIC | Age: 86
End: 2021-01-01

## 2021-01-01 ENCOUNTER — OFFICE VISIT (OUTPATIENT)
Dept: INTERNAL MEDICINE | Facility: CLINIC | Age: 86
End: 2021-01-01
Payer: MEDICARE

## 2021-01-01 ENCOUNTER — HEALTH MAINTENANCE LETTER (OUTPATIENT)
Age: 86
End: 2021-01-01

## 2021-01-01 ENCOUNTER — OFFICE VISIT - HEALTHEAST (OUTPATIENT)
Dept: INTERNAL MEDICINE | Facility: CLINIC | Age: 86
End: 2021-01-01

## 2021-01-01 ENCOUNTER — RECORDS - HEALTHEAST (OUTPATIENT)
Dept: INTERNAL MEDICINE | Facility: CLINIC | Age: 86
End: 2021-01-01

## 2021-01-01 VITALS
HEART RATE: 74 BPM | HEIGHT: 60 IN | BODY MASS INDEX: 20.22 KG/M2 | DIASTOLIC BLOOD PRESSURE: 62 MMHG | WEIGHT: 103 LBS | SYSTOLIC BLOOD PRESSURE: 138 MMHG | OXYGEN SATURATION: 98 %

## 2021-01-01 VITALS
WEIGHT: 108 LBS | DIASTOLIC BLOOD PRESSURE: 64 MMHG | BODY MASS INDEX: 21.2 KG/M2 | HEIGHT: 60 IN | SYSTOLIC BLOOD PRESSURE: 122 MMHG | HEART RATE: 76 BPM | OXYGEN SATURATION: 99 %

## 2021-01-01 VITALS
SYSTOLIC BLOOD PRESSURE: 110 MMHG | BODY MASS INDEX: 20.22 KG/M2 | DIASTOLIC BLOOD PRESSURE: 52 MMHG | OXYGEN SATURATION: 99 % | HEIGHT: 60 IN | HEART RATE: 78 BPM | WEIGHT: 103 LBS

## 2021-01-01 VITALS
DIASTOLIC BLOOD PRESSURE: 60 MMHG | WEIGHT: 110 LBS | HEART RATE: 80 BPM | SYSTOLIC BLOOD PRESSURE: 138 MMHG | HEIGHT: 60 IN | BODY MASS INDEX: 21.6 KG/M2 | TEMPERATURE: 97.8 F | OXYGEN SATURATION: 97 %

## 2021-01-01 VITALS — BODY MASS INDEX: 21.01 KG/M2 | WEIGHT: 107.56 LBS

## 2021-01-01 VITALS
OXYGEN SATURATION: 97 % | WEIGHT: 109 LBS | SYSTOLIC BLOOD PRESSURE: 120 MMHG | DIASTOLIC BLOOD PRESSURE: 70 MMHG | HEIGHT: 60 IN | HEART RATE: 71 BPM | BODY MASS INDEX: 21.4 KG/M2

## 2021-01-01 VITALS — BODY MASS INDEX: 22.38 KG/M2 | WEIGHT: 114.6 LBS | WEIGHT: 107 LBS | BODY MASS INDEX: 20.9 KG/M2

## 2021-01-01 VITALS — HEIGHT: 60 IN | WEIGHT: 107 LBS | BODY MASS INDEX: 21.01 KG/M2

## 2021-01-01 VITALS
BODY MASS INDEX: 22.18 KG/M2 | HEIGHT: 59 IN | OXYGEN SATURATION: 99 % | WEIGHT: 110 LBS | HEART RATE: 75 BPM | DIASTOLIC BLOOD PRESSURE: 70 MMHG | SYSTOLIC BLOOD PRESSURE: 138 MMHG

## 2021-01-01 VITALS
HEIGHT: 60 IN | TEMPERATURE: 97.1 F | WEIGHT: 106 LBS | BODY MASS INDEX: 20.81 KG/M2 | SYSTOLIC BLOOD PRESSURE: 134 MMHG | DIASTOLIC BLOOD PRESSURE: 74 MMHG | OXYGEN SATURATION: 100 % | HEART RATE: 78 BPM

## 2021-01-01 VITALS — BODY MASS INDEX: 21.52 KG/M2 | WEIGHT: 110.19 LBS

## 2021-01-01 VITALS — BODY MASS INDEX: 21.09 KG/M2 | WEIGHT: 108 LBS

## 2021-01-01 VITALS — BODY MASS INDEX: 22.16 KG/M2 | WEIGHT: 112.9 LBS | HEIGHT: 60 IN

## 2021-01-01 VITALS — BODY MASS INDEX: 21.48 KG/M2 | WEIGHT: 110 LBS

## 2021-01-01 VITALS — WEIGHT: 114.1 LBS | BODY MASS INDEX: 22.28 KG/M2

## 2021-01-01 VITALS — BODY MASS INDEX: 20.75 KG/M2 | WEIGHT: 106.25 LBS

## 2021-01-01 VITALS — BODY MASS INDEX: 20.9 KG/M2 | WEIGHT: 107 LBS

## 2021-01-01 DIAGNOSIS — M81.0 OSTEOPOROSIS WITHOUT CURRENT PATHOLOGICAL FRACTURE, UNSPECIFIED OSTEOPOROSIS TYPE: ICD-10-CM

## 2021-01-01 DIAGNOSIS — M54.9 CHRONIC MIDLINE BACK PAIN, UNSPECIFIED BACK LOCATION: ICD-10-CM

## 2021-01-01 DIAGNOSIS — E11.9 TYPE 2 DIABETES MELLITUS WITHOUT COMPLICATION, WITHOUT LONG-TERM CURRENT USE OF INSULIN (H): ICD-10-CM

## 2021-01-01 DIAGNOSIS — R63.4 LOSS OF WEIGHT: ICD-10-CM

## 2021-01-01 DIAGNOSIS — G89.29 CHRONIC MIDLINE BACK PAIN, UNSPECIFIED BACK LOCATION: ICD-10-CM

## 2021-01-01 DIAGNOSIS — E78.2 MIXED HYPERLIPIDEMIA: ICD-10-CM

## 2021-01-01 DIAGNOSIS — D75.89 MACROCYTOSIS: ICD-10-CM

## 2021-01-01 DIAGNOSIS — I10 ESSENTIAL HYPERTENSION: ICD-10-CM

## 2021-01-01 DIAGNOSIS — Z12.31 VISIT FOR SCREENING MAMMOGRAM: ICD-10-CM

## 2021-01-01 DIAGNOSIS — E78.5 HYPERLIPIDEMIA LDL GOAL <100: ICD-10-CM

## 2021-01-01 DIAGNOSIS — Z12.31 OTHER SCREENING MAMMOGRAM: ICD-10-CM

## 2021-01-01 DIAGNOSIS — R79.9 ABNORMAL FINDING OF BLOOD CHEMISTRY, UNSPECIFIED: ICD-10-CM

## 2021-01-01 DIAGNOSIS — D64.89 ANEMIA DUE TO OTHER CAUSE, NOT CLASSIFIED: ICD-10-CM

## 2021-01-01 DIAGNOSIS — E11.9 TYPE 2 DIABETES MELLITUS (H): ICD-10-CM

## 2021-01-01 DIAGNOSIS — Z00.00 PREVENTATIVE HEALTH CARE: Primary | ICD-10-CM

## 2021-01-01 LAB
ALBUMIN SERPL-MCNC: 4.3 G/DL (ref 3.5–5)
ALP SERPL-CCNC: 49 U/L (ref 45–120)
ALT SERPL W P-5'-P-CCNC: 12 U/L (ref 0–45)
ANION GAP SERPL CALCULATED.3IONS-SCNC: 16 MMOL/L (ref 5–18)
AST SERPL W P-5'-P-CCNC: 19 U/L (ref 0–40)
BASOPHILS # BLD AUTO: 0.1 10E3/UL (ref 0–0.2)
BASOPHILS # BLD AUTO: 0.1 THOU/UL (ref 0–0.2)
BASOPHILS NFR BLD AUTO: 1 %
BASOPHILS NFR BLD AUTO: 1 % (ref 0–2)
BILIRUB DIRECT SERPL-MCNC: 0.2 MG/DL
BILIRUB SERPL-MCNC: 0.4 MG/DL (ref 0–1)
BUN SERPL-MCNC: 18 MG/DL (ref 8–28)
CALCIUM SERPL-MCNC: 8.8 MG/DL (ref 8.5–10.5)
CHLORIDE BLD-SCNC: 102 MMOL/L (ref 98–107)
CO2 SERPL-SCNC: 19 MMOL/L (ref 22–31)
CREAT SERPL-MCNC: 0.82 MG/DL (ref 0.6–1.1)
EOSINOPHIL # BLD AUTO: 0.2 10E3/UL (ref 0–0.7)
EOSINOPHIL # BLD AUTO: 0.3 THOU/UL (ref 0–0.4)
EOSINOPHIL NFR BLD AUTO: 2 %
EOSINOPHIL NFR BLD AUTO: 4 % (ref 0–6)
ERYTHROCYTE [DISTWIDTH] IN BLOOD BY AUTOMATED COUNT: 11.7 % (ref 11–14.5)
ERYTHROCYTE [DISTWIDTH] IN BLOOD BY AUTOMATED COUNT: 12.3 % (ref 10–15)
FERRITIN SERPL-MCNC: 43 NG/ML (ref 10–130)
FOLATE RBC-MCNC: 1320 NG/ML
GFR SERPL CREATININE-BSD FRML MDRD: >60 ML/MIN/1.73M2
GLUCOSE BLD-MCNC: 111 MG/DL (ref 70–125)
HBA1C MFR BLD: 6.6 % (ref 0–5.6)
HCT VFR BLD AUTO: 31.6 % (ref 35–47)
HCT VFR BLD AUTO: 32.4 % (ref 35–47)
HGB BLD-MCNC: 10.5 G/DL (ref 11.7–15.7)
HGB BLD-MCNC: 10.8 G/DL (ref 12–16)
IMM GRANULOCYTES # BLD: 0 10E3/UL
IMM GRANULOCYTES # BLD: 0 THOU/UL
IMM GRANULOCYTES NFR BLD: 0 %
IMM GRANULOCYTES NFR BLD: 0 %
IRON SATN MFR SERPL: 22 % (ref 20–50)
IRON SERPL-MCNC: 65 UG/DL (ref 42–175)
LDLC SERPL CALC-MCNC: 37 MG/DL
LYMPHOCYTES # BLD AUTO: 2.2 10E3/UL (ref 0.8–5.3)
LYMPHOCYTES # BLD AUTO: 2.4 THOU/UL (ref 0.8–4.4)
LYMPHOCYTES NFR BLD AUTO: 22 %
LYMPHOCYTES NFR BLD AUTO: 25 % (ref 20–40)
MCH RBC QN AUTO: 34 PG (ref 26.5–33)
MCH RBC QN AUTO: 34.8 PG (ref 27–34)
MCHC RBC AUTO-ENTMCNC: 33.2 G/DL (ref 31.5–36.5)
MCHC RBC AUTO-ENTMCNC: 33.3 G/DL (ref 32–36)
MCV RBC AUTO: 102 FL (ref 78–100)
MCV RBC AUTO: 105 FL (ref 80–100)
MONOCYTES # BLD AUTO: 1.1 10E3/UL (ref 0–1.3)
MONOCYTES # BLD AUTO: 1.2 THOU/UL (ref 0–0.9)
MONOCYTES NFR BLD AUTO: 11 %
MONOCYTES NFR BLD AUTO: 12 % (ref 2–10)
NEUTROPHILS # BLD AUTO: 5.7 THOU/UL (ref 2–7.7)
NEUTROPHILS # BLD AUTO: 6.2 10E3/UL (ref 1.6–8.3)
NEUTROPHILS NFR BLD AUTO: 59 % (ref 50–70)
NEUTROPHILS NFR BLD AUTO: 64 %
PLATELET # BLD AUTO: 221 THOU/UL (ref 140–440)
PLATELET # BLD AUTO: 327 10E3/UL (ref 150–450)
PMV BLD AUTO: 12.2 FL (ref 7–10)
POTASSIUM BLD-SCNC: 4.2 MMOL/L (ref 3.5–5)
PROT SERPL-MCNC: 7 G/DL (ref 6–8)
RBC # BLD AUTO: 3.09 10E6/UL (ref 3.8–5.2)
RBC # BLD AUTO: 3.1 MILL/UL (ref 3.8–5.4)
SODIUM SERPL-SCNC: 137 MMOL/L (ref 136–145)
TIBC SERPL-MCNC: 302 UG/DL (ref 313–563)
TRANSFERRIN SERPL-MCNC: 242 MG/DL (ref 212–360)
TRANSFERRIN SERPL-MCNC: 242 MG/DL (ref 212–360)
VIT B12 SERPL-MCNC: 610 PG/ML (ref 213–816)
WBC # BLD AUTO: 9.7 10E3/UL (ref 4–11)
WBC: 9.7 THOU/UL (ref 4–11)

## 2021-01-01 PROCEDURE — 85025 COMPLETE CBC W/AUTO DIFF WBC: CPT | Performed by: INTERNAL MEDICINE

## 2021-01-01 PROCEDURE — 82747 ASSAY OF FOLIC ACID RBC: CPT | Mod: 90 | Performed by: INTERNAL MEDICINE

## 2021-01-01 PROCEDURE — 82607 VITAMIN B-12: CPT | Performed by: INTERNAL MEDICINE

## 2021-01-01 PROCEDURE — 99214 OFFICE O/P EST MOD 30 MIN: CPT | Mod: 25 | Performed by: INTERNAL MEDICINE

## 2021-01-01 PROCEDURE — 83036 HEMOGLOBIN GLYCOSYLATED A1C: CPT | Performed by: INTERNAL MEDICINE

## 2021-01-01 PROCEDURE — G0439 PPPS, SUBSEQ VISIT: HCPCS | Performed by: INTERNAL MEDICINE

## 2021-01-01 PROCEDURE — 83721 ASSAY OF BLOOD LIPOPROTEIN: CPT | Performed by: INTERNAL MEDICINE

## 2021-01-01 PROCEDURE — 99000 SPECIMEN HANDLING OFFICE-LAB: CPT | Performed by: INTERNAL MEDICINE

## 2021-01-01 PROCEDURE — 36415 COLL VENOUS BLD VENIPUNCTURE: CPT | Performed by: INTERNAL MEDICINE

## 2021-01-01 RX ORDER — ATORVASTATIN CALCIUM 20 MG/1
20 TABLET, FILM COATED ORAL DAILY
Qty: 90 TABLET | Refills: 3 | Status: ON HOLD | OUTPATIENT
Start: 2021-01-01 | End: 2022-01-01

## 2021-01-01 RX ORDER — ACETAMINOPHEN 500 MG
1000 TABLET ORAL 2 TIMES DAILY
Qty: 100 TABLET | Refills: 2 | Status: ON HOLD
Start: 2021-01-01 | End: 2022-01-01

## 2021-01-01 RX ORDER — AMLODIPINE BESYLATE 5 MG/1
5 TABLET ORAL DAILY
Qty: 90 TABLET | Refills: 3 | Status: SHIPPED | OUTPATIENT
Start: 2021-01-01 | End: 2022-01-01

## 2021-01-01 RX ORDER — METOPROLOL SUCCINATE 100 MG/1
100 TABLET, EXTENDED RELEASE ORAL DAILY
Qty: 90 TABLET | Refills: 3 | Status: ON HOLD | OUTPATIENT
Start: 2021-01-01 | End: 2022-01-01

## 2021-01-01 RX ORDER — LISINOPRIL 40 MG/1
40 TABLET ORAL DAILY
Qty: 90 TABLET | Refills: 3 | Status: ON HOLD | OUTPATIENT
Start: 2021-01-01 | End: 2022-01-01

## 2021-01-01 RX ORDER — ALENDRONATE SODIUM 70 MG/1
70 TABLET ORAL
Qty: 12 TABLET | Refills: 3 | Status: SHIPPED | OUTPATIENT
Start: 2021-01-01 | End: 2022-01-01

## 2021-01-01 ASSESSMENT — PATIENT HEALTH QUESTIONNAIRE - PHQ9
SUM OF ALL RESPONSES TO PHQ QUESTIONS 1-9: 13

## 2021-01-01 ASSESSMENT — ACTIVITIES OF DAILY LIVING (ADL)
CURRENT_FUNCTION: TRANSPORTATION REQUIRES ASSISTANCE
CURRENT_FUNCTION: MEDICATION ADMINISTRATION REQUIRES ASSISTANCE
CURRENT_FUNCTION: LAUNDRY REQUIRES ASSISTANCE
CURRENT_FUNCTION: BATHING REQUIRES ASSISTANCE
CURRENT_FUNCTION: PREPARING MEALS REQUIRES ASSISTANCE
CURRENT_FUNCTION: HOUSEWORK REQUIRES ASSISTANCE
CURRENT_FUNCTION: MONEY MANAGEMENT REQUIRES ASSISTANCE
CURRENT_FUNCTION: SHOPPING REQUIRES ASSISTANCE

## 2021-01-01 ASSESSMENT — MIFFLIN-ST. JEOR
SCORE: 843.7
SCORE: 854.59

## 2021-01-20 ENCOUNTER — DOCUMENTATION ONLY (OUTPATIENT)
Dept: OTHER | Facility: CLINIC | Age: 86
End: 2021-01-20

## 2021-01-20 ENCOUNTER — AMBULATORY - HEALTHEAST (OUTPATIENT)
Dept: OTHER | Facility: CLINIC | Age: 86
End: 2021-01-20

## 2021-01-28 ENCOUNTER — COMMUNICATION - HEALTHEAST (OUTPATIENT)
Dept: INTERNAL MEDICINE | Facility: CLINIC | Age: 86
End: 2021-01-28

## 2021-01-28 DIAGNOSIS — M81.0 OSTEOPOROSIS WITHOUT CURRENT PATHOLOGICAL FRACTURE, UNSPECIFIED OSTEOPOROSIS TYPE: ICD-10-CM

## 2021-02-01 ENCOUNTER — COMMUNICATION - HEALTHEAST (OUTPATIENT)
Dept: INTERNAL MEDICINE | Facility: CLINIC | Age: 86
End: 2021-02-01

## 2021-02-10 ENCOUNTER — AMBULATORY - HEALTHEAST (OUTPATIENT)
Dept: NURSING | Facility: CLINIC | Age: 86
End: 2021-02-10

## 2021-03-03 ENCOUNTER — AMBULATORY - HEALTHEAST (OUTPATIENT)
Dept: NURSING | Facility: CLINIC | Age: 86
End: 2021-03-03

## 2021-04-12 ENCOUNTER — COMMUNICATION - HEALTHEAST (OUTPATIENT)
Dept: INTERNAL MEDICINE | Facility: CLINIC | Age: 86
End: 2021-04-12

## 2021-04-12 DIAGNOSIS — I10 ESSENTIAL HYPERTENSION: ICD-10-CM

## 2021-04-13 ENCOUNTER — COMMUNICATION - HEALTHEAST (OUTPATIENT)
Dept: INTERNAL MEDICINE | Facility: CLINIC | Age: 86
End: 2021-04-13

## 2021-04-13 DIAGNOSIS — I10 ESSENTIAL HYPERTENSION: ICD-10-CM

## 2021-04-14 ENCOUNTER — COMMUNICATION - HEALTHEAST (OUTPATIENT)
Dept: FAMILY MEDICINE | Facility: CLINIC | Age: 86
End: 2021-04-14

## 2021-04-14 DIAGNOSIS — E11.9 TYPE 2 DIABETES MELLITUS (H): ICD-10-CM

## 2021-05-27 NOTE — TELEPHONE ENCOUNTER
Former patient of maribel Silva & has not established care with another provider.  Please assign refill request to covering provider per Clinic standard process.        Refill Approved    Rx renewed per Medication Renewal Policy. Medication was last renewed on 5/2/18.    Cherie Rey, [facility] Connection Triage/Med Refill 4/5/2019     Requested Prescriptions   Pending Prescriptions Disp Refills     metoprolol succinate (TOPROL-XL) 100 MG 24 hr tablet [Pharmacy Med Name: METOPROLOL SUCC  MG TAB] 90 tablet 0     Sig: TAKE 1 TABLET BY MOUTH EVERY DAY    Beta-Blockers Refill Protocol Passed - 4/4/2019  1:17 AM       Passed - PCP or prescribing provider visit in past 12 months or next 3 months    Last office visit with prescriber/PCP: Visit date not found OR same dept: 3/20/2019 Dakota Castro CNP OR same specialty: 3/20/2019 Dakota Castro CNP  Last physical: Visit date not found Last MTM visit: Visit date not found   Next visit within 3 mo: Visit date not found  Next physical within 3 mo: Visit date not found  Prescriber OR PCP: David Roldan MD  Last diagnosis associated with med order: 1. Hypertension  - metoprolol succinate (TOPROL-XL) 100 MG 24 hr tablet [Pharmacy Med Name: METOPROLOL SUCC  MG TAB]; TAKE 1 TABLET BY MOUTH EVERY DAY  Dispense: 90 tablet; Refill: 0    If protocol passes may refill for 12 months if within 3 months of last provider visit (or a total of 15 months).            Passed - Blood pressure filed in past 12 months    BP Readings from Last 1 Encounters:   03/20/19 100/50

## 2021-05-28 NOTE — TELEPHONE ENCOUNTER
Former patient of aletha Silva & has not established care with another provider.  Please assign refill request to covering provider per Clinic standard process.      Refill Approved    Rx renewed per Medication Renewal Policy. Medication was last renewed on 5/2/18.    Cherie Rey, [facility] Triage/Med Refill 5/2/2019     Requested Prescriptions   Pending Prescriptions Disp Refills     amLODIPine (NORVASC) 5 MG tablet [Pharmacy Med Name: AMLODIPINE BESYLATE 5 MG TAB] 90 tablet 3     Sig: TAKE 1 TABLET BY MOUTH DAILY       Calcium-Channel Blockers Protocol Passed - 5/2/2019  1:32 AM        Passed - PCP or prescribing provider visit in past 12 months or next 3 months     Last office visit with prescriber/PCP: 7/11/2017 Aletha Silva MD OR same dept: 3/20/2019 Dakota Castro CNP OR same specialty: 3/20/2019 Dakota Castro CNP  Last physical: 8/17/2016 Last MTM visit: Visit date not found   Next visit within 3 mo: Visit date not found  Next physical within 3 mo: Visit date not found  Prescriber OR PCP: Aletha Silva MD  Last diagnosis associated with med order: 1. Hypertension  - amLODIPine (NORVASC) 5 MG tablet [Pharmacy Med Name: AMLODIPINE BESYLATE 5 MG TAB]; TAKE 1 TABLET BY MOUTH DAILY  Dispense: 90 tablet; Refill: 3  - lisinopril (PRINIVIL,ZESTRIL) 40 MG tablet [Pharmacy Med Name: LISINOPRIL 40 MG TABLET]; TAKE 1 TABLET BY MOUTH ONCE DAILY  Dispense: 90 tablet; Refill: 3    If protocol passes may refill for 12 months if within 3 months of last provider visit (or a total of 15 months).             Passed - Blood pressure filed in past 12 months     BP Readings from Last 1 Encounters:   03/20/19 100/50             lisinopril (PRINIVIL,ZESTRIL) 40 MG tablet [Pharmacy Med Name: LISINOPRIL 40 MG TABLET] 90 tablet 3     Sig: TAKE 1 TABLET BY MOUTH ONCE DAILY       Ace Inhibitors Refill Protocol Passed - 5/2/2019  1:32 AM        Passed - PCP or prescribing provider visit in past 12 months       Last  office visit with prescriber/PCP: 7/11/2017 Aletha Silva MD OR same dept: 3/20/2019 Dakota Castro CNP OR same specialty: 3/20/2019 Dakota Castro CNP  Last physical: 8/17/2016 Last MTM visit: Visit date not found   Next visit within 3 mo: Visit date not found  Next physical within 3 mo: Visit date not found  Prescriber OR PCP: Aletha Silva MD  Last diagnosis associated with med order: 1. Hypertension  - amLODIPine (NORVASC) 5 MG tablet [Pharmacy Med Name: AMLODIPINE BESYLATE 5 MG TAB]; TAKE 1 TABLET BY MOUTH DAILY  Dispense: 90 tablet; Refill: 3  - lisinopril (PRINIVIL,ZESTRIL) 40 MG tablet [Pharmacy Med Name: LISINOPRIL 40 MG TABLET]; TAKE 1 TABLET BY MOUTH ONCE DAILY  Dispense: 90 tablet; Refill: 3    If protocol passes may refill for 12 months if within 3 months of last provider visit (or a total of 15 months).             Passed - Serum Potassium in past 12 months     Lab Results   Component Value Date    Potassium 4.5 03/14/2019             Passed - Blood pressure filed in past 12 months     BP Readings from Last 1 Encounters:   03/20/19 100/50             Passed - Serum Creatinine in past 12 months     Creatinine   Date Value Ref Range Status   03/14/2019 0.90 0.70 - 1.30 mg/dL Final

## 2021-05-30 NOTE — TELEPHONE ENCOUNTER
Refill Approved    Rx renewed per Medication Renewal Policy. Medication was last renewed on 10/30/2018.  Last OV 7/17/2019.    Jayashree Boyer, [facility] Connection Triage/Med Refill 7/30/2019     Requested Prescriptions   Pending Prescriptions Disp Refills     atorvastatin (LIPITOR) 20 MG tablet [Pharmacy Med Name: ATORVASTATIN 20 MG TABLET] 90 tablet 2     Sig: TAKE 1 TABLET BY MOUTH DAILY       Statins Refill Protocol (Hmg CoA Reductase Inhibitors) Passed - 7/30/2019  1:27 AM        Passed - PCP or prescribing provider visit in past 12 months      Last office visit with prescriber/PCP: 7/11/2017 Aletha Silva MD OR same dept: 3/20/2019 Dakota Castro CNP OR same specialty: 7/17/2019 Agatha Ford CNP  Last physical: 8/17/2016 Last MTM visit: Visit date not found   Next visit within 3 mo: Visit date not found  Next physical within 3 mo: Visit date not found  Prescriber OR PCP: Aletha Sliva MD  Last diagnosis associated with med order: 1. Mixed hyperlipidemia  - atorvastatin (LIPITOR) 20 MG tablet [Pharmacy Med Name: ATORVASTATIN 20 MG TABLET]; TAKE 1 TABLET BY MOUTH DAILY  Dispense: 90 tablet; Refill: 2    If protocol passes may refill for 12 months if within 3 months of last provider visit (or a total of 15 months).

## 2021-05-30 NOTE — TELEPHONE ENCOUNTER
Spoke with patient and she would be okay seeing Dr. Connors or Agatha Ford at our [place] location. She needs to coordinate this with her daughter since she is who gives her a ride. She asked me to give her a call. I did leave a message for her daughter Sanaz to return call to clinic. When she returns call please help her schedule EC appointment with one of these clinicians at [place]. Routing refill.  Estela Stevenson CMA ............... 11:43 AM, 07/15/19

## 2021-05-30 NOTE — PROGRESS NOTES
Internal Medicine Office Visit  [facility]  Patient Name: Nalini Sepulveda  Patient Age: 83 y.o.  YOB: 1935  MRN: [id number]    Date of Visit: 2019  Reason for Office Visit:   Chief Complaint   Patient presents with     Immunizations     Prevnar and MMR vaccines. Would like to know if she should get them.      Medication Problem     Would like to discuss an alternative to taking the Tolterodine.            Assessment / Plan / Medical Decision Makin. Immunity status testing  - Varicella Zoster Antibody, IgG  - Rubeola Antibody, IgG    2. Unsteady gait  Recommend patient utilize her cane to assist with ambulation.  She will be referred to physical therapy due to unsteady gait with recommendation that she contact her local [facility] + of her silver sneakers and to request if there is any other individuals that attends [employer] that would be willing to provide her with transportation to and from the facility.  - Ambulatory referral to PT/OT    3.  Overactive bladder  Patient is provided additional education material on other medications available via prescription for overactive bladder in the interim she will continue on Detrol 2 mg daily    Orders Placed This Encounter   Procedures     Varicella Zoster Antibody, IgG     Rubeola Antibody, IgG     Ambulatory referral to PT/OT   Followup: Return in about 4 weeks (around 2019). earlier if needed.    Health Maintenance Review  Health Maintenance   Topic Date Due     DIABETES OPHTHALMOLOGY EXAM  1945     ZOSTER VACCINES (1 of 2) 1987     DXA SCAN  2017     MAMMOGRAM  10/20/2017     DIABETES HEMOGLOBIN A1C  2019     DIABETES FOLLOW-UP  2019     INFLUENZA VACCINE RULE BASED (1) 2019     DIABETES FOOT EXAM  2020     DIABETES URINE MICROALBUMIN  2020     FALL RISK ASSESSMENT  2020     TD 18+ HE  01/10/2023     ADVANCE DIRECTIVES DISCUSSED WITH PATIENT   02/12/2024     PNEUMOCOCCAL POLYSACCHARIDE VACCINE AGE 65 AND OVER  Completed     PNEUMOCOCCAL CONJUGATE VACCINE FOR ADULTS (PCV13 OR PREVNAR)  Completed         I am having Nalini Sepulveda maintain her blood glucose test, aspirin, UBIDECARENONE (CO Q-10 ORAL), KRILL OIL ORAL, LANCETS MISC, MULTIVITAMIN ORAL, B-complex with vitamin C, cholecalciferol (vitamin D3), lutein, anastrozole, ondansetron, ACCU-CHEK COMPACT PLUS TEST, atorvastatin, FLUZONE HIGH-DOSE 2018-19 (PF), vitamin E/quinine sulfate (QUININE-VITAMIN E ORAL), metFORMIN, loratadine, metoprolol succinate, amLODIPine, lisinopril, and tolterodine.      HPI:  Nalini Sepulveda is a 83 y.o. year old who presents to the office today requesting MMR titer to ensure immunity.  She states she had measles as a child. She also states she did not have chicken pox as a child and is requesting immunity status check.     Requesting if there are alternative options to Detrol.  She is unsure why her daughter wants to know. Patient is currently taking 2mg daily.  She is interested in getting some material on other options and is requesting printed education.        She reports she fell the other day and fell back and down hill.  She is unsure if she got caught up on her dogs leash.  She is interested in seeing PT and going to [facility].      Review of Systems- pertinent positive in bold:  Constitutional: Fever, chills, night sweats, fainting, weight change, fatigue, dizziness, sleeping difficulties, loud snoring/pauses in breathing  Eyes: change in vision, blurred or double vision, redness/eye pain  Ears, nose, mouth, throat: change in hearing, ear pain, hoarseness, difficulty swallowing, sores in the mouth or throat  Respiratory: shortness of breath, cough, bloody sputum, wheezing  Cardiovascular: chest pain, palpitations   Gastrointestinal: abdominal pain, heartburn/indigestion, nausea/vomiting, change in appetite, change in bowel habits, constipation or diarrhea, rectal  bleeding/dark stools, difficulty swallowing  Urinary: painful urination, frequent urination, urinary urgency/incontinence, blood in urine/dark urine, nocturia (new or increasing), muscle cramps, swelling of hands, feet, ankles, leg pain/redness  Skin: change in moles/freckles, rash, nodules  Hematologic/lymphatic: swollen lymph glands, abnormal bruising/bleeding  Endocrine: excessive thirst/urination, cold or heat intolerance  Neurologic/emotional: worrisome memory change, numbness/tingling, anxiety, mood swings      Current Scheduled Meds:  Outpatient Encounter Medications as of 7/17/2019   Medication Sig Dispense Refill     ACCU-CHEK COMPACT PLUS TEST Strp USE 2 OR 3 TIMES DAILY AS DIRECTED 102 strip 1     amLODIPine (NORVASC) 5 MG tablet TAKE 1 TABLET BY MOUTH DAILY 90 tablet 3     anastrozole (ARIMIDEX) 1 mg tablet Take 1 mg by mouth daily.       aspirin 81 MG EC tablet Take 81 mg by mouth daily.       atorvastatin (LIPITOR) 20 MG tablet TAKE 1 TABLET BY MOUTH DAILY 90 tablet 2     B-complex with vitamin C (VITAMIN B COMPLEX WITH C) cap Take 1 capsule by mouth daily.       blood sugar diagnostic (GLUCOSE BLOOD) Strp Accu-Chek Compact Test Drum In Vitro Strip. Use 2 or 3 times daily as directed.       cholecalciferol, vitamin D3, 1,000 unit tablet Take 1,000 Units by mouth daily.       LANCETS MISC Use As Directed. 2 or 3 times daily.       lisinopril (PRINIVIL,ZESTRIL) 40 MG tablet TAKE 1 TABLET BY MOUTH ONCE DAILY 90 tablet 3     lutein 10 mg Tab Take 10 mg by mouth daily.       metFORMIN (GLUCOPHAGE) 500 MG tablet TAKE 2 TABLETS BY MOUTH 2 TIMES A DAY WITH MEALS. NEED TO BE SEEN FOR FURTHER REFILLS. 360 tablet 3     metoprolol succinate (TOPROL-XL) 100 MG 24 hr tablet TAKE 1 TABLET BY MOUTH EVERY DAY 90 tablet 3     MULTIVITAMIN ORAL Take 1 tablet by mouth daily. Tablet.       tolterodine (DETROL LA) 2 MG ER capsule TAKE 1 CAPSULE (2 MG TOTAL) BY MOUTH DAILY. 90 capsule 2     UBIDECARENONE (CO Q-10 ORAL) Take  1 capsule by mouth daily. CAPS       vitamin E/quinine sulfate (QUININE-VITAMIN E ORAL) Take by mouth.       FLUZONE HIGH-DOSE 2018-19, PF, 180 mcg/0.5 mL Syrg injection        KRILL OIL ORAL Take 1 capsule by mouth daily. 1000 mg oral capsule.        loratadine (CLARITIN) 10 mg tablet Take 10 mg by mouth daily.       ondansetron (ZOFRAN-ODT) 4 MG disintegrating tablet Take 4 mg by mouth.       No facility-administered encounter medications on file as of 7/17/2019.      Past Medical History:   Diagnosis Date     Adenocarcinoma of breast (H)     left     Allergic rhinitis      Arthritis      Benign adenomatous polyp of large intestine      Breast cancer (H) 2006    Left side     Diabetes (H)      Hx of radiation therapy 2006     Hyperlipidemia      Hypertension      Menopause      S/P mastectomy 12/5/2015     Past Surgical History:   Procedure Laterality Date     BREAST BIOPSY Left 2006     BREAST LUMPECTOMY Left 2006     HYSTERECTOMY  1994     MASTECTOMY Left 12/2015     VT EXCIS BARTHOLIN GLAND/CYST      Description: Excision Of Bartholin's Gland Or Cyst;  Recorded: 07/29/2008;     VT INCISE VESTIBULAR NERVE,TRANSLABYR Left 12/4/2015    Procedure: LEFT LATISSIMUS DORSI FLAP BREAST RECONSTRUCTION WITH GEL IMPLANT;  Surgeon: Harley Abernathy MD;  Location: [facility];  Service:      VT MASTECTOMY, MODIFIED RADICAL Left 12/4/2015    Procedure: LEFT BREAST MASTECTOMY;  Surgeon: Jeferson Rodriguez MD;  Location: [facility];  Service: General     VT TOTAL ABDOM HYSTERECTOMY      Description: Total Abdominal Hysterectomy;  Recorded: 12/30/2009;  Comments: BSO     Social History     Social History Narrative    She reports that she lives in [facility] and in an apartment.  States that she no longer drives.  She reports that [place] is [place] not been out there for nearly 20 years.  She reports that she enjoyed living in [place] taking the train  to [facility].  She reports that she would like to exercise and go to the [facility]       Social History     Tobacco Use     Smoking status: Never Smoker     Smokeless tobacco: Never Used   Substance Use Topics     Alcohol use: No     Drug use: No     Family History   Problem Relation Age of Onset     Depression Mother      Diabetes Mother      Heart disease Mother      Hypertension Mother      Stroke Father      Cancer Maternal Grandmother         Thinks it was stomach cancer, but not sure.     Cancer Paternal Aunt 60        Thinks it was stomach cancer     Diabetes Sister      Heart disease Sister      Objective / Physical Examination:  Vitals:    07/17/19 1119   BP: 128/68   Pulse: 75   Resp: 20   Temp: 97.9  F (36.6  C)   SpO2: 99%   Weight: 107 lb (48.5 kg)   Height: 5' (1.524 m)     Wt Readings from Last 3 Encounters:   07/17/19 107 lb (48.5 kg)   03/20/19 107 lb (48.5 kg)   03/15/19 107 lb (48.5 kg)     Body mass index is 20.9 kg/m .     General Appearance: Alert and oriented, cooperative, affect appropriate, speech clear, in no apparent distress  Head: Normocephalic, atraumatic  Eyes:   Conjunctivae clear and sclerae non-icteric  Throat: Lips and mucosa moist.   Lungs:  Normal inspiratory and expiratory effort  Neuro: Alert and oriented, follows commands appropriately.       Agatha Ford, CNP

## 2021-05-30 NOTE — TELEPHONE ENCOUNTER
Former patient of Aletha Silva & has not established care with another provider.  Please assign refill request to covering provider per Clinic standard process.      Refill Approved    Rx renewed per Medication Renewal Policy. Medication was last renewed on 5/17/18.    Elena Burton, [facility] Connection Triage/Med Refill 7/14/2019     Requested Prescriptions   Pending Prescriptions Disp Refills     tolterodine (DETROL LA) 2 MG ER capsule [Pharmacy Med Name: TOLTERODINE TART ER 2 MG CAP] 90 capsule 2     Sig: TAKE 1 CAPSULE (2 MG TOTAL) BY MOUTH DAILY.       Urinary Incontinence Medications Refill Protocol Passed - 7/14/2019  8:35 AM        Passed - PCP or prescribing provider visit in past 12 months       Last office visit with prescriber/PCP: 7/11/2017 Aletha Silva MD OR same dept: 3/20/2019 Dakota Castro CNP OR same specialty: 3/20/2019 Dakota Castro CNP  Last physical: 8/17/2016 Last MTM visit: Visit date not found   Next visit within 3 mo: Visit date not found  Next physical within 3 mo: Visit date not found  Prescriber OR PCP: Aletha Silva MD  Last diagnosis associated with med order: 1. Urinary incontinence  - tolterodine (DETROL LA) 2 MG ER capsule [Pharmacy Med Name: TOLTERODINE TART ER 2 MG CAP]; Take 1 capsule (2 mg total) by mouth daily.  Dispense: 90 capsule; Refill: 2    If protocol passes may refill for 12 months if within 3 months of last provider visit (or a total of 15 months).

## 2021-05-31 NOTE — PROGRESS NOTES
"[facility] Daily Progress     Patient Name: Nalini Sepulveda  Date: 8/20/2019  Visit #: 6  PTA visit #:  2  Referral Diagnosis: Unsteadiness on feet  Referring provider: Sheeba Ford DO  Visit Diagnosis:     ICD-10-CM    1. Unsteadiness on feet R26.81    2. Generalized muscle weakness M62.81          Assessment:   Pt continues to demonstrate decreased LE/hip and core strength.  Pt is quickly fatiqued when performing hip and core strengthening exercises.    HEP/POC compliance is  good .  Patient demonstrates understanding/independence with home program.  Patient is benefitting from skilled physical therapy and is making steady progress toward functional goals.  Patient is appropriate to continue with skilled physical therapy intervention, as indicated by initial plan of care.    Goal Status: On going  Pt. will demonstrate/verbalize independence in self-management of condition in : 12 weeks  Pt. will be independent with home exercise program in : 6 weeks    Patient will increase : LEFS score;by _ points;for improved quality of function;for improved quality of life;in 12 weeks  by ___ points: 9  Pt will: be able to perform 10 sit to stands within 30 seconds w/ use of UE's in order to improve her transfers in 10 weeks.  Pt will: be able to ambulate 125 m in 2' in order to improve her functional mobility towards age-gender norms within 8 weeks.      Plan / Patient Education:     Continue with initial plan of care.  Progress with home program as tolerated.     Exercises:  Exercise #1: rhomberg 1'   Comment #1: standing heel/toe raises, marching, hip abd, hip ext x15-20 B  Exercise #2: modified tandem stand  Comment #2: 30\" x 2   Exercise #3: side stepping along railing 15 ft x4  Exercise #4: ambulating with head turns  Comment #4: 75 ft x1 wiht CGA    Plan for next session: progress static and dynamic balance activities    Subjective:   \" I'm not getting real strong but I'm better.\"  Pt reports no falls or " "LOB since her last visit.  Pt states her knees \"grind\" with the sit to stand.  Pain Rating: NA           Objective:   Patient not using AD despite being told several times to use AD  Weakness noted with exercises      Treatment Today     TREATMENT MINUTES COMMENTS   Evaluation     Self-care/ Home management     Manual therapy     Neuromuscular Re-education 10 On Airex: rhomberg, 1/2 tandem 1' B. Side stepping in ll bars w/ 2HH and 1 HH.   Therapeutic Activity     Therapeutic Exercises 15 Nu step WL 4 x5' (patient instructed to not let knees go into valgus position with this).       reviewed and pt performed in clinic: clamshell until fatigue B w/ 5\" holds, hooklying hip adduction using ball x20 with 5\" holds, bridge x 10, SLR until fatigue 5\" x10 (pt fatiques at 8)       Gait training     Modality__________________                Total 25    Blank areas are intentional and mean the treatment did not include these items.       Anusha Tesfaye, PTA,CLT  8/20/2019  "

## 2021-05-31 NOTE — PROGRESS NOTES
"[facility] Daily Progress     Patient Name: Nalini Sepulveda  Date: 8/9/2019  Visit #: 3  PTA visit #:  1  Referral Diagnosis: Unsteadiness on feet  Referring provider: Sheeba Ford DO  Visit Diagnosis:     ICD-10-CM    1. Unsteadiness on feet R26.81    2. Generalized muscle weakness M62.81          Assessment:   Patient reports compliance with her HEP. She tolerated core strengthening well today without any pain. She does demonstrates core weakness with today's exercises.      HEP/POC compliance is  good .  Patient demonstrates understanding/independence with home program.  Patient is benefitting from skilled physical therapy and is making steady progress toward functional goals.  Patient is appropriate to continue with skilled physical therapy intervention, as indicated by initial plan of care.    Goal Status: On going  Pt. will demonstrate/verbalize independence in self-management of condition in : 12 weeks  Pt. will be independent with home exercise program in : 6 weeks    Patient will increase : LEFS score;by _ points;for improved quality of function;for improved quality of life;in 12 weeks  by ___ points: 9  Pt will: be able to perform 10 sit to stands within 30 seconds w/ use of UE's in order to improve her transfers in 10 weeks.  Pt will: be able to ambulate 125 m in 2' in order to improve her functional mobility towards age-gender norms within 8 weeks.      Plan / Patient Education:     Continue with initial plan of care.  Progress with home program as tolerated.     Exercises:  Exercise #1: rhomberg 1'   Comment #1: standing heel/toe raises, marching, hip abd, hip ext x15-20 B  Exercise #2: modified tandem stand  Comment #2: 30\" x 2   Exercise #3: side stepping along railing 15 ft x4  Exercise #4: ambulating with head turns  Comment #4: 75 ft x1 wiht CGA    Plan for next session: standing LE strengthening, Progress static and dynamic balance drills.    Subjective:     Pain Rating: NA    No " "falls. Exercises are going well. Compliant with her HEP. Doesn't want additional exercises at this time for her HEP.      Objective:     Increases in pain w/ nu' step on L 5; changed to L3 which felt better. Also encouraged patient to have neutral knee alignment and avoid valgus of B knees on machine, which helped  No increase in pain w/ new exercises today    Treatment Today     TREATMENT MINUTES COMMENTS   Evaluation     Self-care/ Home management     Manual therapy     Neuromuscular Re-education     Therapeutic Activity     Therapeutic Exercises 24 Nu step WL 3 (was at WL 5 pt c/o L knee pain) x5'. Discussed progress and her HEP. Exercises performed in clinic but not added to HEP: clamshell until fatigue B w/ 5\" holds, hooklying hip adduction x20 with 5\" holds, hooklying hip abd w/ L1 TB x20 B, SLR until fatigue (~13) with 5\" holds B, mini bridge x18 with 5\" holds, LAQ x10 with 5\" holds B       Gait training     Modality__________________                Total 24    Blank areas are intentional and mean the treatment did not include these items.       Romaine Gordillo, PT, DPT  8/9/2019    "

## 2021-05-31 NOTE — PROGRESS NOTES
[facility] Daily Progress     Patient Name: Nalini Sepulveda  Date: 8/27/2019  Visit #: 8  PTA visit #:  3  Referral Diagnosis: Unsteadiness on feet  Referring provider: Sheeba Ford DO  Visit Diagnosis:     ICD-10-CM    1. Unsteadiness on feet R26.81    2. Generalized muscle weakness M62.81          Assessment:   Pt reports feeling about the same since beginning PT. However, she would like to continue with a few more sessions to see if she can make some improvements in her balance. It was recommended that she f/u with Ortho regarding chronic knee pain, as she's had injections in the past and these have helped her quite a bit. She does not demonstrate any improvements since beginning therapy but will try another 4 visits and re-assess at that time.     HEP/POC compliance is  good .  Patient demonstrates understanding/independence with home program.  Patient is benefitting from skilled physical therapy and is making steady progress toward functional goals.  Patient is appropriate to continue with skilled physical therapy intervention, as indicated by initial plan of care.    Goal Status: On going  Pt. will demonstrate/verbalize independence in self-management of condition in : 12 weeks  Pt. will be independent with home exercise program in : 6 weeks    Patient will increase : LEFS score;by _ points;for improved quality of function;for improved quality of life;in 12 weeks;Met  by ___ points: 9  Pt will: be able to perform 10 sit to stands within 30 seconds w/ use of UE's in order to improve her transfers in 10 weeks.  Pt will: be able to ambulate 125 m in 2' in order to improve her functional mobility towards age-gender norms within 8 weeks.      Plan / Patient Education:     Continue with initial plan of care.  Progress with home program as tolerated.     Exercises:  Exercise #1: rhomberg 1' on Airex  Comment #1: standing heel/toe raises, marching, hip abd, hip ext x15-20 B  Exercise #2: modified  "tandem stand on Airex  Comment #2: 30\" x 2   Exercise #3: side stepping 20 ft x 4 (CGA)    Plan for next session: progress static and dynamic balance drills    Subjective:      Knees are still painful. Compliant with her HEP. Doesn't feel that she's made any progress with her balance in physical therapy but would like to try a few more sessions. Feeling tired today. Spends most of her day sitting.       Objective:   Gait: still not using a SEC into clinic (although recommended to use one)  Sit to stand: 4.5x/30 seconds with use of UE's  2' walk test: 110 m  Lower Extremity Functional Scale (_/80): 44  rhomberg and 1/2 tandem: improvement in stability but 1 LOB when distracted by someone walking by while testing. Able to self-recover      Treatment Today     TREATMENT MINUTES COMMENTS   Evaluation     Self-care/ Home management     Manual therapy     Neuromuscular Re-education     Therapeutic Activity     Therapeutic Exercises 12 Nu step WL 3 x6'. Discussed progress and possibly going to ortho to get injections for knee pain. Discussed goals and went through LEFS.   Gait training     Performance Testing 13 Sit to stand, rhomberg, 1/2 tandem, 2' walk test              Total 25    Blank areas are intentional and mean the treatment did not include these items.       Romaine Gordillo, PT, DPT  8/27/2019  "

## 2021-05-31 NOTE — PROGRESS NOTES
"[facility] Daily Progress     Patient Name: Nalini Sepulveda  Date: 8/13/2019  Visit #: 4  PTA visit #:  1  Referral Diagnosis: Unsteadiness on feet  Referring provider: Sheeba Ford DO  Visit Diagnosis:     ICD-10-CM    1. Unsteadiness on feet R26.81    2. Generalized muscle weakness M62.81          Assessment:   Patient reports continued compliance with her HEP. She continues to have difficulty with balance and has quite a bit of LE strength.    HEP/POC compliance is  good .  Patient demonstrates understanding/independence with home program.  Patient is benefitting from skilled physical therapy and is making steady progress toward functional goals.  Patient is appropriate to continue with skilled physical therapy intervention, as indicated by initial plan of care.    Goal Status: On going  Pt. will demonstrate/verbalize independence in self-management of condition in : 12 weeks  Pt. will be independent with home exercise program in : 6 weeks    Patient will increase : LEFS score;by _ points;for improved quality of function;for improved quality of life;in 12 weeks  by ___ points: 9  Pt will: be able to perform 10 sit to stands within 30 seconds w/ use of UE's in order to improve her transfers in 10 weeks.  Pt will: be able to ambulate 125 m in 2' in order to improve her functional mobility towards age-gender norms within 8 weeks.      Plan / Patient Education:     Continue with initial plan of care.  Progress with home program as tolerated.     Exercises:  Exercise #1: rhomberg 1'   Comment #1: standing heel/toe raises, marching, hip abd, hip ext x15-20 B  Exercise #2: modified tandem stand  Comment #2: 30\" x 2   Exercise #3: side stepping along railing 15 ft x4  Exercise #4: ambulating with head turns  Comment #4: 75 ft x1 wiht CGA    Plan for next session: standing LE strengthening, Progress static and dynamic balance drills.    Subjective:     Pain Rating: NA    Compliant with her HEP. Knees are " "painful/limiting.       Objective:   Patient not using AD  Weakness noted with exercises    Treatment Today     TREATMENT MINUTES COMMENTS   Evaluation     Self-care/ Home management     Manual therapy     Neuromuscular Re-education 8 On flat: aureliano, 1/2 tandem 1' B. Side stepping in ll bars w/ 2HH and 1 HH.   Therapeutic Activity     Therapeutic Exercises 17 Nu step WL 3 x5'. Discussed progress. Recommend patient ice her knees if they're painful.     Exercises performed in clinic but not added to HEP: clamshell until fatigue B w/ 5\" holds, hooklying hip adduction using ball x20 with 5\" holds, hooklying hip abd w/ L1 TB x20 B, SLR until fatigue (~13) with 5\" holds B       Gait training     Modality__________________                Total 25    Blank areas are intentional and mean the treatment did not include these items.       Romaine Gordillo, PT, DPT  8/13/2019    "

## 2021-05-31 NOTE — PROGRESS NOTES
"[facility] Daily Progress     Patient Name: Nalini Sepulveda  Date: 8/23/2019  Visit #: 7  PTA visit #:  3  Referral Diagnosis: Unsteadiness on feet  Referring provider: Sheeba Ford DO  Visit Diagnosis:     ICD-10-CM    1. Unsteadiness on feet R26.81    2. Generalized muscle weakness M62.81          Assessment:   Pt with difficulty performing sit to stand. Pt needing to use 1 hand to assist. Subjective report of knee pain and \"grinding.\"  Pt with LOB when turning to the L when ambulating.    HEP/POC compliance is  good .  Patient demonstrates understanding/independence with home program.  Patient is benefitting from skilled physical therapy and is making steady progress toward functional goals.  Patient is appropriate to continue with skilled physical therapy intervention, as indicated by initial plan of care.    Goal Status: On going  Pt. will demonstrate/verbalize independence in self-management of condition in : 12 weeks  Pt. will be independent with home exercise program in : 6 weeks    Patient will increase : LEFS score;by _ points;for improved quality of function;for improved quality of life;in 12 weeks  by ___ points: 9  Pt will: be able to perform 10 sit to stands within 30 seconds w/ use of UE's in order to improve her transfers in 10 weeks.  Pt will: be able to ambulate 125 m in 2' in order to improve her functional mobility towards age-gender norms within 8 weeks.      Plan / Patient Education:     Continue with initial plan of care.  Progress with home program as tolerated.     Exercises:  Exercise #1: rhomberg 1'   Comment #1: standing heel/toe raises, marching, hip abd, hip ext x15-20 B  Exercise #2: modified tandem stand  Comment #2: 30\" x 2   Exercise #3: side stepping along railing 15 ft x4  Exercise #4: ambulating with head turns  Comment #4: 75 ft x1 wiht CGA    Plan for next session: progress static and dynamic balance activities    Subjective:   Pt reports her HEP is going well. " "  Pt reports that by the end of her day her ankles are swollen.  Pt reports no falls or LOB since her last visit.  Pain reports B lateral knee pain. Pt reports this is constant with movement.   Pain Rating: NA           Objective:   Sit to stand: 5 reps in 30\" (19' chair) with use of 1 hand to assist when getting up      Treatment Today     TREATMENT MINUTES COMMENTS   Evaluation     Self-care/ Home management     Manual therapy     Neuromuscular Re-education 20 On Airex: carolyneerg, 1/2 tandem 1' B.   Side stepping (CGA) 20 ft x 4   Forward walk with head turns 20 ft x 4   Therapeutic Activity     Therapeutic Exercises 10 Nu step WL 4 x6'      reviewed HEP: clamshell until fatigue B w/ 5\" holds, hooklying hip adduction using ball x20 with 5\" holds, bridge x 10, SLR until fatigue 5\" x10 (pt fatiques at 8)       Gait training     Modality__________________                Total 30    Blank areas are intentional and mean the treatment did not include these items.       Anusha Tesfaye, PTA,CLT  8/23/2019  "

## 2021-05-31 NOTE — PROGRESS NOTES
"[facility] Daily Progress     Patient Name: Nalini Sepulveda  Date: 8/16/2019  Visit #: 5  PTA visit #:  1  Referral Diagnosis: Unsteadiness on feet  Referring provider: Sheeba Ford DO  Visit Diagnosis:     ICD-10-CM    1. Unsteadiness on feet R26.81    2. Generalized muscle weakness M62.81          Assessment:   Patient reports continued compliance with her HEP. She continues to have difficulty with balance and has quite a bit of LE weakness.    HEP/POC compliance is  good .  Patient demonstrates understanding/independence with home program.  Patient is benefitting from skilled physical therapy and is making steady progress toward functional goals.  Patient is appropriate to continue with skilled physical therapy intervention, as indicated by initial plan of care.    Goal Status: On going  Pt. will demonstrate/verbalize independence in self-management of condition in : 12 weeks  Pt. will be independent with home exercise program in : 6 weeks    Patient will increase : LEFS score;by _ points;for improved quality of function;for improved quality of life;in 12 weeks  by ___ points: 9  Pt will: be able to perform 10 sit to stands within 30 seconds w/ use of UE's in order to improve her transfers in 10 weeks.  Pt will: be able to ambulate 125 m in 2' in order to improve her functional mobility towards age-gender norms within 8 weeks.      Plan / Patient Education:     Continue with initial plan of care.  Progress with home program as tolerated.     Exercises:  Exercise #1: rhomberg 1'   Comment #1: standing heel/toe raises, marching, hip abd, hip ext x15-20 B  Exercise #2: modified tandem stand  Comment #2: 30\" x 2   Exercise #3: side stepping along railing 15 ft x4  Exercise #4: ambulating with head turns  Comment #4: 75 ft x1 wiht CGA    Plan for next session: progress static and dynamic balance activities    Subjective:     Pain Rating: NA    Knees are still painful. Not icing. Compliant with her " "HEP.       Objective:   Patient not using AD despite being told several times to use AD  Weakness noted with exercises      Treatment Today     TREATMENT MINUTES COMMENTS   Evaluation     Self-care/ Home management     Manual therapy     Neuromuscular Re-education 8 On flat: carolyneerg, 1/2 tandem 1' B. Side stepping in ll bars w/ 2HH and 1 HH.   Therapeutic Activity     Therapeutic Exercises 15 Nu step WL 4 x5' (patient instructed to not let knees go into valgus position with this). Discussed progress.      Exercises performed in clinic but not added to HEP: clamshell until fatigue B w/ 5\" holds, hooklying hip adduction using ball x20 with 5\" holds, bridge x5, SLR until fatigue (~13) with 5\" holds B       Gait training     Modality__________________                Total 23    Blank areas are intentional and mean the treatment did not include these items.       Romaine Gordillo, PT, DPT  8/16/2019    "

## 2021-05-31 NOTE — PROGRESS NOTES
[facility] Certification Request    August 2, 2019      Patient: Nalini Sepulveda  MR Number: [id number]  YOB: 1935  Date of Visit: 8/2/2019      Dear Dr. Ford,    Thank you for this referral.   We are seeing Nalini Sepulveda for Physical Therapy of unsteady gait.    Medicare and/or Medicaid requires physician review and approval of the treatment plan. Please review the plan of care and verify that you agree with the therapy plan of care by co-signing this note.      Plan of Care  Authorization / Certification Start Date: 08/02/19  Authorization / Certification End Date: 11/02/19  Authorization / Certification Number of Visits: 12  Communication with: Referral Source  Patient Related Instruction: Nature of Condition;Next steps;Expected outcome;Treatment plan and rationale;Self Care instruction;Basis of treatment;Body mechanics;Posture;Precautions  Times per Week: 1-2  Number of Weeks: 12  Number of Visits: 12  Discharge Planning: when PT goals are met  Precautions / Restrictions : moderate falls risk  Therapeutic Exercise: ROM;Stretching;Strengthening  Neuromuscular Reeducation: core;balance/proprioception;posture  Manual Therapy: soft tissue mobilization;myofascial release;joint mobilization;muscle energy  Equipment: theraband      Goals:  Pt. will demonstrate/verbalize independence in self-management of condition in : 12 weeks  Pt. will be independent with home exercise program in : 6 weeks    Patient will increase : LEFS score;by _ points;for improved quality of function;for improved quality of life;in 12 weeks  by ___ points: 9  Pt will: be able to perform 10 sit to stands within 30 seconds w/ use of UE's in order to improve her transfers in 10 weeks.  Pt will: be able to ambulate 125 m in 2' in order to improve her functional mobility towards age-gender norms within 8 weeks.        If you have any questions or concerns, please don't hesitate to call.    Sincerely,      Romaine Gordillo  PT, DPT        Physician recommendation:     ___ Follow therapist's recommendation        ___ Modify therapy      *Physician co-signature indicates they certify the need for these services furnished within this plan and while under their care.      [facility]   Initial Evaluation    Patient Name: Nalini Sepulveda  Date of evaluation: 8/2/2019  Referral Diagnosis: Unsteady gait  Referring provider: Agatha Ford,*  Visit Diagnosis:     ICD-10-CM    1. Unsteadiness on feet R26.81    2. Generalized muscle weakness M62.81        Assessment:       Nalini Sepulveda is a 83 y.o. female who presents to therapy today with chief complaints of decreased balance. Symptoms began 2017. Difficulty with walking, standing, stairs, getting out of a car, getting into bed, getting off of standard toilet due to weakness and knee pain. She tested as a moderate falls risk today. I recommended to her that she use a SEC at all times based on her test results today. PT POC and goals have been discussed with patient and She  is agreeable to these. Patient appears motivated for physical therapy and is appropriate for skilled therapy services.    The POC is dynamic and will be modified on an ongoing basis.  Barriers to achieving goals as noted in the assessment section may affect outcome.  Prognosis to achieve goals is  good   Pt. is appropriate for skilled PT intervention as outlined in the Plan of Care (POC).  Pt. is a good candidate for skilled PT services to improve pain levels and function.    Goals:  Pt. will demonstrate/verbalize independence in self-management of condition in : 12 weeks  Pt. will be independent with home exercise program in : 6 weeks    Patient will increase : LEFS score;by _ points;for improved quality of function;for improved quality of life;in 12 weeks  by ___ points: 9  Pt will: be able to perform 10 sit to stands within 30 seconds w/ use of UE's in order to improve her transfers in 10 weeks.  Pt  will: be able to ambulate 125 m in 2' in order to improve her functional mobility towards age-gender norms within 8 weeks.      Patient's expectations/goals are realistic.    Barriers to Learning or Achieving Goals:  Chronicity of the problem.  Co-morbidities or other medical factors.  see [facility]       Plan / Patient Instructions:        Plan of Care:   Authorization / Certification Start Date: 08/02/19  Authorization / Certification End Date: 11/02/19  Authorization / Certification Number of Visits: 12  Communication with: Referral Source  Patient Related Instruction: Nature of Condition;Next steps;Expected outcome;Treatment plan and rationale;Self Care instruction;Basis of treatment;Body mechanics;Posture;Precautions  Times per Week: 1-2  Number of Weeks: 12  Number of Visits: 12  Discharge Planning: when PT goals are met  Precautions / Restrictions : moderate falls risk  Therapeutic Exercise: ROM;Stretching;Strengthening  Neuromuscular Reeducation: core;balance/proprioception;posture  Manual Therapy: soft tissue mobilization;myofascial release;joint mobilization;muscle energy  Equipment: theraband       Exercises:  Exercise #1: aureliano 1'   Comment #1: standing heel/toe raises, marching, hip abd, hip ext x15-20 B      Plan for next visit: nu' step, dynamic and static balance exs     Subjective:         Social information:   Living Situation: lives with dog in apartment (independent living)   Occupation: retired   Hobbies: watching TV (a lot)   Doesn't drive    History of Present Illness:    Nalini is a 83 y.o. female who presents to therapy today with complaints of decreased balance. Date of onset/duration of symptoms is 2017. She does have a cane but doesn't feel that she needs it yet. Has had 2-3 falls within the past year. Fell one time when her dog pulled her. Doesn't walk her dog due to being fearful that the dog will pull her down. Can't remember how she fell the other few times but it's usually when there's  something that's in her way or she trips on something. Balance seems to be getting worse. Doesn't exercise on a regular basis. Doesn't have stairs for her home, but her kids have homes that require stairs. She is going to move into her son's home in a few months which does have stairs, but she will be living primarily on one level.     PMH: Her knees bother her quite a bit    Functional limitations are described as occurring with: walking, standing, stairs, getting out of a car, getting into bed, getting off of standard toilet       Objective:      Patient Outcome Measures :    Lower Extremity Functional Scale (_/80): 28     Scores range from 0-80, where a score of 80 represents maximum function. The minimal clinically important difference is a positive change of 9 points.    Examination  1. Unsteadiness on feet     2. Generalized muscle weakness       Involved side: Bilateral  Posture Observation: flexed trunk    Sensation: intact per subjective in B LE's    Sit to stand: 8x/30 seconds with heavy use of UE's. Knee pain increased with this     LE strength: 4/5  LE coordination: minimally impaired B  Rhomberg: mild-mod unstable  Gait: no AD. Mildly unstable.   Tinnetti: 20/28 indicating a moderate risk for falls  B knee valgus with standing and gait  2' walk test: 111 m w/o use of AD. No LOB (age-gender norm: 134.3 m)        Treatment Today     TREATMENT MINUTES COMMENTS   Evaluation 19    Self-care/ Home management     Manual therapy     Neuromuscular Re-education 1 rhomberg 1'   Therapeutic Activity     Therapeutic Exercises 23 Discussed PT POC and pathology of condition. Answered patient questions. Began HEP-see flowsheet. Recommend patient use SEC at all times to prevent falls. Encouraged patient to have her cell phone with her at all times in case she falls and needs assistance getting up. Discussed getting a raised toilet at her son's home, grab bars in the shower, and a shower bench when she moves into son's  home.   Gait training     Modality__________________                Total 43    Blank areas are intentional and mean the treatment did not include these items.     PT Evaluation Code: (Please list factors)  Patient History/Comorbidities: see PMH  Examination: unsteadiness on feet  Clinical Presentation: stable  Clinical Decision Making: low    Patient History/  Comorbidities Examination  (body structures and functions, activity limitations, and/or participation restrictions) Clinical Presentation Clinical Decision Making (Complexity)   No documented Comorbidities or personal factors 1-2 Elements Stable and/or uncomplicated Low   1-2 documented comorbidities or personal factor 3 Elements Evolving clinical presentation with changing characteristics Moderate   3-4 documented comorbidities or personal factors 4 or more Unstable and unpredictable High            Romaine Gordillo  8/2/2019  10:06 AM

## 2021-06-01 NOTE — PROGRESS NOTES
[facility] Daily Progress     Patient Name: Nalini Sepulveda  Date: 9/5/2019  Visit #: 9  PTA visit #:  3  Referral Diagnosis: Unsteadiness on feet  Referring provider: Agatha Ford,*  Visit Diagnosis:     ICD-10-CM    1. Unsteadiness on feet R26.81    2. Generalized muscle weakness M62.81          Assessment:   Patient tolerated session well today with 2 LOB but did not fall. She reports compliance with her HEP.    HEP/POC compliance is  good .  Patient demonstrates understanding/independence with home program.  Patient is benefitting from skilled physical therapy and is making steady progress toward functional goals.  Patient is appropriate to continue with skilled physical therapy intervention, as indicated by initial plan of care.    Goal Status: On going  Pt. will demonstrate/verbalize independence in self-management of condition in : 12 weeks  Pt. will be independent with home exercise program in : 6 weeks    Patient will increase : LEFS score;by _ points;for improved quality of function;for improved quality of life;in 12 weeks;Met  by ___ points: 9  Pt will: be able to perform 10 sit to stands within 30 seconds w/ use of UE's in order to improve her transfers in 10 weeks.  Pt will: be able to ambulate 125 m in 2' in order to improve her functional mobility towards age-gender norms within 8 weeks.      Plan / Patient Education:     Continue with initial plan of care.  Progress with home program as tolerated.     Exercises:  Exercise #1: rhomberg 1' on Airex  Comment #1: standing heel/toe raises, marching, hip abd, hip ext x15-20 B  Exercise #2: 1/2 tandem 1' B    Plan for next session: progress static and dynamic balance drills    Subjective:      Knees are still bothersome. Compliant with her HEP.     Objective:   Gait: still not using a SEC into clinic (although recommended to use one)        Treatment Today     TREATMENT MINUTES COMMENTS   Evaluation     Self-care/ Home management      Manual therapy     Neuromuscular Re-education 15 On flat: rhomberg 1', 1/2 tandem 1' B, SLS on 1 leg while holding on with 2 hands(R only. L increased knee pain and was terminated). On foam: rhomberg, modified 1/2 tandem 1' each B. In ll bars: tandem walking 10' x4, side stepping 10' x4. Stepping over stick x10 front/back and to the side. 2 LOB with side stepping over the stick.    Therapeutic Activity     Therapeutic Exercises 9 Nu step WL 4 x6'. Discussed HEP.    Gait training     Performance Testing                Total 24    Blank areas are intentional and mean the treatment did not include these items.       Romaine Gordillo, PT, DPT  9/5/2019

## 2021-06-01 NOTE — PROGRESS NOTES
"[facility] Daily Progress     Patient Name: Nalini Sepulveda  Date: 9/16/2019  Visit #: 10  PTA visit #:  4  Referral Diagnosis: Unsteadiness on feet  Referring provider: No ref. provider found  Visit Diagnosis:     ICD-10-CM    1. Unsteadiness on feet R26.81    2. Generalized muscle weakness M62.81          Assessment:   Pt did well with the balance drills today, no LOB.    HEP/POC compliance is  good .  Patient demonstrates understanding/independence with home program.  Patient is benefitting from skilled physical therapy and is making steady progress toward functional goals.  Patient is appropriate to continue with skilled physical therapy intervention, as indicated by initial plan of care.    Goal Status: On going  Pt. will demonstrate/verbalize independence in self-management of condition in : 12 weeks  Pt. will be independent with home exercise program in : 6 weeks    Patient will increase : LEFS score;by _ points;for improved quality of function;for improved quality of life;in 12 weeks;Met  by ___ points: 9  Pt will: be able to perform 10 sit to stands within 30 seconds w/ use of UE's in order to improve her transfers in 10 weeks.  Pt will: be able to ambulate 125 m in 2' in order to improve her functional mobility towards age-gender norms within 8 weeks.      Plan / Patient Education:     Continue with initial plan of care.  Progress with home program as tolerated.     Exercises:  Exercise #1: rhomberg 1' on Airex  Comment #1: standing heel/toe raises, marching, hip abd, hip ext x15-20 B  Exercise #2: 1/2 tandem 1' B    Plan for next session: progress static and dynamic balance drills  Pt will bring in her cane next visit.  Pt instructed to use her cane when out in the community.    Subjective:     Pt  reports she has been compliant with her HEP. Pt reports no falls or LOB since her last visit.  \" Not fast enough progress.\"   Pt report her her knees hurt.  Pt asking if she should use her " "cane.    Objective:   Gait: still not using a SEC into clinic (although recommended to use one)        Treatment Today     TREATMENT MINUTES COMMENTS   Evaluation     Self-care/ Home management     Manual therapy     Neuromuscular Re-education 23 Balance drill: (today 9/16)  On firm and foam:  -NBOS 60\"  -modified tandem stand 30\" x 2  Dynamic balance:  -side step over 3 short hurdles 10 ft x4  -forward step over 3 hurdles 10 ft x4  -march  10 ft x4  -tandem walk 10 ft x4   Therapeutic Activity     Therapeutic Exercises 6 Nu step WL 4 x6'. Last 3' legs only, pt having L shoulder discomfort   Discussed HEP.    Gait training     Performance Testing                Total 29    Blank areas are intentional and mean the treatment did not include these items.       Anusha Tesfaye, PTA,CLT  9/16/2019  "

## 2021-06-01 NOTE — PROGRESS NOTES
[facility] Daily Progress     Patient Name: Nalini Sepulveda  Date: 9/26/2019  Visit #: 11/12  PTA visit #:  4  Referral Diagnosis: Unsteadiness on feet  Referring provider: No ref. provider found  Visit Diagnosis:     ICD-10-CM    1. Unsteadiness on feet R26.81    2. Generalized muscle weakness M62.81          Assessment:     Nalini has been seen for unsteadiness on feet/balance and ha been seen for 11 visits to date (8/2/19-9/26/19).  We retested 2 min walk, 30 second sit to stand and Tinnetti today, and she demonstrates no clinically significant change and in fact worsening of objective tests.    We discussed barriers to her progress, it seems that her bilateral knee pain may be the most significant barrier.  We discussed that we will discharge her at this time, she needs to establish a new PCP and discuss her knee pain but I recommended she do some therapy for her knees as she has never tried before.  We discussed the importance of continuing her HEP even though PT is ending.    We will discharge her at this time to f/u with PCP.    Goal Status: On going  Pt. will demonstrate/verbalize independence in self-management of condition in : 12 weeks: MET  Pt. will be independent with home exercise program in : 6 weeks: Progressed    Patient will increase : LEFS score;by _ points;for improved quality of function;for improved quality of life;in 12 weeks;Met  by ___ points: 9  Pt will: be able to perform 10 sit to stands within 30 seconds w/ use of UE's in order to improve her transfers in 10 weeks: NOT MET  Pt will: be able to ambulate 125 m in 2' in order to improve her functional mobility towards age-gender norms within 8 weeks: NOT MET      Plan / Patient Education:     Continue with initial plan of care.  Progress with home program as tolerated.     Exercises:  Exercise #1: rhomberg 1' on Airex  Comment #1: standing heel/toe raises, marching, hip abd, hip ext x15-20 B  Exercise #2: 1/2 tandem 1' B    Plan  "for next session: progress static and dynamic balance drills  Pt will bring in her cane next visit.  Pt instructed to use her cane when out in the community.    Subjective:     Pt  reports she has been compliant with her HEP. Pt reports no falls or LOB since her last visit.  \" Not fast enough progress.\"   Pt report her her knees hurt.  Pt asking if she should use her cane.    Objective:     Lives in a house there is a hill up to it.  It is 2 story, she avoids the stairs because of knee pain.    30 sec sit<>stand: 7x, heavy use of UE, knee pain increased especially when trying not to use her hands. (Previous 8x)    2 min walk test: 71.3 M with SPC (Previous 111 M)    Tinetti Balance Assessment  1) Sitting balance (2)  2) Arises (1)  3) Attempts to rise (1)  4) Immediate standing balance (2)  5) Standing balance (2)  6) Nudged (2)  7) Eyes closed (1)  8) Turning 360 degrees (2)  9) Sitting down (1)  10) Initiation of gait (1)   11 a) Right swing foot pass (1)            Right swing foot clearance (0)   11 b) Left swing foot pass (1)            Left swing foot clearance (0)  12) Step symmetry (1)  13) Step continuity (1)  14) Path (1)  15 Trunk (1)  16) Walking time (0)    Total score: 21/28  <19 = high falls risk  19-24 = moderate falls risk  24-28 low falls risk         Treatment Today     TREATMENT MINUTES COMMENTS   Evaluation     Self-care/ Home management     Manual therapy     Neuromuscular Re-education     Therapeutic Activity     Therapeutic Exercises 10 Nu step WL 4 x6'. Reviewed HEP with pt and addressed any questions/issues.  Education on the importance of continuing to do her exercises even though PT is ending.   Gait training     Performance Testing 15 See above              Total 25    Blank areas are intentional and mean the treatment did not include these items.       Mariaelena Chew, DPT  9/26/2019  "

## 2021-06-03 NOTE — PROGRESS NOTES
Office Visit   Nalini Sepulveda   84 y.o. female    Date of Visit: 11/22/2019    Chief Complaint   Patient presents with     Get established visit     Diabetes        Assessment and Plan   1. Type 2 diabetes mellitus without complication, without long-term current use of insulin (H)  Her last A1c was about 8 months ago and was at 8.8.  I am going to recheck that today.  We will also do a urine microalbumin and check her metabolic panel.  I will get back to her with the results and determine next steps from there.  She has not had any recent cardiac symptoms and her foot exam is normal today.  - Glycosylated Hemoglobin A1c  - Comprehensive Metabolic Panel  - Vitamin B12  - Microalbumin, Random Urine  - Thyroid Cascade    2. Mixed hyperlipidemia  She is going to be due for recheck of her lipids but she is not fasting.  I am going to have her come in for a physical in a few months and will plan to do fasting lipids at that time.    3. Hypertension  Blood pressure is well controlled on her medications.  - Vitamin B12    4. Unsteady gait  I am going to recheck her blood work.  She is worked with physical therapy on this.  Safe at home.  - HM2(CBC w/o Differential)  - Vitamin B12  - Thyroid Cascade    5. Menopause  - DXA Bone Density Scan; Future         Return in about 4 months (around 3/22/2020) for Annual physical.     History of Present Illness   This 84 y.o. old-year-old female comes in to establish care and discuss her chronic medical problems.  She has a history of type 2 diabetes mellitus.  She is on metformin.  Last A1c was a little bit elevated about 8 months ago.  We will plan to recheck that today.  She has not had any significant symptoms of neuropathy, chest pain, shortness of breath or other worrisome cardiac symptoms.  She has good blood pressure control and is on a statin medication.  She takes a daily aspirin.  She has no acute concerns today.  We did review age-appropriate health maintenance and she is  due for bone density screening.  We will set that up.  She has a history of breast cancer and has a mammogram scheduled in January.    Review of Systems: As above, systems otherwise reviewed and negative.     Medications, Allergies and Problem List   Patient Active Problem List   Diagnosis     Type 2 diabetes mellitus (H)     Mixed hyperlipidemia     Hypertension     Benign Adenomatous Polyp Of The Large Intestine     Allergic Rhinitis     Adenocarcinoma Of The Breast     S/P mastectomy     Sleep apnea     Current Outpatient Medications   Medication Sig Dispense Refill     ACCU-CHEK COMPACT PLUS TEST Strp USE 2 OR 3 TIMES DAILY AS DIRECTED 102 strip 1     amLODIPine (NORVASC) 5 MG tablet TAKE 1 TABLET BY MOUTH DAILY 90 tablet 3     atorvastatin (LIPITOR) 20 MG tablet TAKE 1 TABLET BY MOUTH DAILY 90 tablet 3     blood sugar diagnostic (GLUCOSE BLOOD) Strp Accu-Chek Compact Test Drum In Vitro Strip. Use 2 or 3 times daily as directed.       lisinopril (PRINIVIL,ZESTRIL) 40 MG tablet TAKE 1 TABLET BY MOUTH ONCE DAILY 90 tablet 3     metFORMIN (GLUCOPHAGE) 500 MG tablet TAKE 2 TABLETS BY MOUTH 2 TIMES A DAY WITH MEALS. NEED TO BE SEEN FOR FURTHER REFILLS. 360 tablet 3     metoprolol succinate (TOPROL-XL) 100 MG 24 hr tablet TAKE 1 TABLET BY MOUTH EVERY DAY 90 tablet 3     aspirin 81 MG EC tablet Take 81 mg by mouth daily.       B-complex with vitamin C (VITAMIN B COMPLEX WITH C) cap Take 1 capsule by mouth daily.       cholecalciferol, vitamin D3, 1,000 unit tablet Take 1,000 Units by mouth daily.       KRILL OIL ORAL Take 1 capsule by mouth daily. 1000 mg oral capsule.        loratadine (CLARITIN) 10 mg tablet Take 10 mg by mouth daily.       lutein 10 mg Tab Take 10 mg by mouth daily.       MULTIVITAMIN ORAL Take 1 tablet by mouth daily. Tablet.       ondansetron (ZOFRAN-ODT) 4 MG disintegrating tablet Take 4 mg by mouth.       UBIDECARENONE (CO Q-10 ORAL) Take 1 capsule by mouth daily. CAPS       vitamin E/quinine  sulfate (QUININE-VITAMIN E ORAL) Take by mouth.       No current facility-administered medications for this visit.      Allergies   Allergen Reactions     Codeine Hives and Itching          Physical Exam     /64 (Patient Site: Right Arm, Patient Position: Sitting)   Pulse 76   Ht 5' (1.524 m)   Wt 108 lb (49 kg)   SpO2 99%   BMI 21.09 kg/m      General:  Patient is alert and in no apparent distress.  Neck:  Supple with no adenopathy or thyroid abnormality noted.  Cardiovascular:  Regular rate and rhythm, normal S1/S2, no murmurs, rubs, or gallop.  Pulmonary:  Lungs are clear to auscultation bilaterally with normal respiratory effort.  Extremities:  No joint abnormalities with no LE edema.  Strong dorsalis pedis pulses.  Foot exam shows no ulcerations and good blood flow sensation.  Neurologic Cranial nerves are intact.  No focal deficits.  Psychiatric:  Pleasant, no confusion or agitation   Skin:  Warm and well perfused with no rashes or abnormalities.         Additional Information   Social History     Tobacco Use     Smoking status: Never Smoker     Smokeless tobacco: Never Used   Substance Use Topics     Alcohol use: No     Drug use: No          Didi Dunbar MD

## 2021-06-05 NOTE — TELEPHONE ENCOUNTER
Refill Approved    Rx renewed per Medication Renewal Policy. Medication was last renewed on 02/12/2019    Mariam Lopez, [facility] Triage/Med Refill 1/29/2020     Requested Prescriptions   Pending Prescriptions Disp Refills     metFORMIN (GLUCOPHAGE) 500 MG tablet [Pharmacy Med Name: METFORMIN  MG TABLET] 360 tablet 3     Sig: TAKE 2 TABLETS BY MOUTH 2 TIMES A DAY WITH MEALS       Metformin Refill Protocol Passed - 1/29/2020  2:15 AM        Passed - Blood pressure in last 12 months     BP Readings from Last 1 Encounters:   11/22/19 122/64             Passed - LFT or AST or ALT in last 12 months     Albumin   Date Value Ref Range Status   11/22/2019 4.5 3.5 - 5.0 g/dL Final     Bilirubin, Total   Date Value Ref Range Status   11/22/2019 0.8 0.0 - 1.0 mg/dL Final     Bilirubin, Direct   Date Value Ref Range Status   04/14/2017 0.2 <=0.5 mg/dL Final     Alkaline Phosphatase   Date Value Ref Range Status   11/22/2019 67 45 - 120 U/L Final     AST   Date Value Ref Range Status   11/22/2019 18 0 - 40 U/L Final     ALT   Date Value Ref Range Status   11/22/2019 11 0 - 45 U/L Final     Protein, Total   Date Value Ref Range Status   11/22/2019 7.5 6.0 - 8.0 g/dL Final                Passed - GFR or Serum Creatinine in last 6 months     GFR MDRD Non Af Amer   Date Value Ref Range Status   11/22/2019 60 (L) >60 mL/min/1.73m2 Final     GFR MDRD Af Amer   Date Value Ref Range Status   11/22/2019 >60 >60 mL/min/1.73m2 Final             Passed - Visit with PCP or prescribing provider visit in last 6 months or next 3 months     Last office visit with prescriber/PCP: Visit date not found OR same dept: 3/20/2019 Dakota Castro CNP OR same specialty: 11/22/2019 Didi Dunbar MD Last physical: Visit date not found Last MTM visit: Visit date not found         Next appt within 3 mo: Visit date not found  Next physical within 3 mo: Visit date not found  Prescriber OR PCP: Dakota Castro CNP  Last diagnosis associated  with med order: 1. Type 2 diabetes mellitus (H)  - metFORMIN (GLUCOPHAGE) 500 MG tablet [Pharmacy Med Name: METFORMIN  MG TABLET]; TAKE 2 TABLETS BY MOUTH 2 TIMES A DAY WITH MEALS  Dispense: 360 tablet; Refill: 3     If protocol passes may refill for 12 months if within 3 months of last provider visit (or a total of 15 months).           Passed - A1C in last 6 months     Hemoglobin A1c   Date Value Ref Range Status   11/22/2019 7.0 (H) 3.5 - 6.0 % Final               Passed - Microalbumin in last year      Microalbumin, Random Urine   Date Value Ref Range Status   11/22/2019 11.09 (H) 0.00 - 1.99 mg/dL Final

## 2021-06-06 NOTE — PROGRESS NOTES
Assessment and Plan:     1. Wellness examination  Her examination today did find some signs of memory loss.  We also discussed getting an advanced directive.  Her daughter is with her and notes that she has been doing well enough at home.  She has not had any falls and is no longer driving.  She has been eating well.  We reviewed age-appropriate health maintenance and she is otherwise up-to-date.    2. Type 2 diabetes mellitus (H)  Most recent A1c was satisfactory and we will recheck again today.  She is not having any problems with chest pain or palpitations or other worrisome cardiac symptoms.  Foot exam today is normal.  We will check her lab work.  Her blood pressure is well controlled and she is on a statin medication.  - metFORMIN (GLUCOPHAGE) 500 MG tablet; TAKE 2 TABLETS BY MOUTH 2 TIMES A DAY WITH MEALS  Dispense: 360 tablet; Refill: 3  - HM2(CBC w/o Differential)  - Lipid Cascade  - Glycosylated Hemoglobin A1c  - Microalbumin, Random Urine  - Thyroid Wellborn    3. Hypertension  - amLODIPine (NORVASC) 5 MG tablet; Take 1 tablet (5 mg total) by mouth daily.  Dispense: 90 tablet; Refill: 3  - lisinopriL (PRINIVIL,ZESTRIL) 40 MG tablet; Take 1 tablet (40 mg total) by mouth daily.  Dispense: 90 tablet; Refill: 3  - metoprolol succinate (TOPROL-XL) 100 MG 24 hr tablet; Take 1 tablet (100 mg total) by mouth daily.  Dispense: 90 tablet; Refill: 3  - Thyroid Cascade    4. Mixed hyperlipidemia  We will recheck her lipids today.  - atorvastatin (LIPITOR) 20 MG tablet; Take 1 tablet (20 mg total) by mouth daily.  Dispense: 90 tablet; Refill: 3  - Lipid Cascade    5. Osteoporosis without current pathological fracture, unspecified osteoporosis type  Recent bone density did show osteoporosis.  We will start Fosamax.  I discussed potential side effects.  I am also going to check a vitamin D level today.  - alendronate (FOSAMAX) 70 MG tablet; Take 1 tablet (70 mg total) by mouth every 7 days. Take in the morning on an  empty stomach with a full glass of water 30 minutes before food  Dispense: 12 tablet; Refill: 3  - Vitamin D, Total (25-Hydroxy)    6. Memory loss  Her recall of 3 objects was 0 out of 3.  She has 3 children who check on her frequently and help with her medications.  I do think it would be beneficial to have her evaluated more thoroughly and I am going to refer her to the neurology memory clinic.  - Vitamin B12  - Ambulatory referral to Neurology Memory Clinic    The patient's current medical problems were reviewed.    I have had an Advance Directives discussion with the patient.  The following health maintenance schedule was reviewed with the patient and provided in printed form in the after visit summary:   Health Maintenance   Topic Date Due     DIABETIC EYE EXAM  1935     ZOSTER VACCINES (1 of 2) 11/11/1987     LIPID  08/17/2017     A1C  05/22/2020     BMP  11/22/2020     MICROALBUMIN  11/22/2020     MAMMOGRAM  01/02/2021     MEDICARE ANNUAL WELLNESS VISIT  02/27/2021     DIABETIC FOOT EXAM  02/27/2021     FALL RISK ASSESSMENT  02/27/2021     DXA SCAN  01/09/2022     TD 18+ HE  01/10/2023     ADVANCE CARE PLANNING  02/12/2024     PNEUMOCOCCAL IMMUNIZATION 65+ LOW/MEDIUM RISK  Completed     INFLUENZA VACCINE RULE BASED  Completed        Subjective:   Chief Complaint: Nalini Sepulveda is an 84 y.o. female here for an Annual Wellness visit.   HPI: She is up-to-date on age-appropriate health maintenance.  We did review the documentation for her wellness visit today.  She has not had any problems with falls and has 3 children who check on her frequently.  She does not have an advanced directive and we did discuss the importance of getting one.  In addition she did memory screening and only scored 2 out of 5.  We discussed this and her daughter has noted some problems with her memory.  She is agreeable to further evaluation in the memory clinic.  She has history of type 2 diabetes mellitus.  She is fasting today  and we will recheck her labs.  She has no known endorgan damage.  She is on a statin medication and has good blood pressure control.  She has not had any symptoms of chest pain or palpitations.  She recently had bone density screening that showed osteoporosis.  We discussed making sure that she is getting adequate vitamin D as well as starting Fosamax.  She is agreeable to this.  She has no acute concerns today.    Review of Systems:  Please see above.  The rest of the review of systems are negative for all systems.    Patient Care Team:  Didi Dunbar MD as PCP - General (Internal Medicine)  Didi Dunbar MD as Assigned PCP     Patient Active Problem List   Diagnosis     Type 2 diabetes mellitus (H)     Mixed hyperlipidemia     Hypertension     Benign Adenomatous Polyp Of The Large Intestine     Allergic Rhinitis     Adenocarcinoma Of The Breast     S/P mastectomy     Sleep apnea     Osteoporosis without current pathological fracture, unspecified osteoporosis type     Past Medical History:   Diagnosis Date     Adenocarcinoma of breast (H)     left     Allergic rhinitis      Arthritis      Benign adenomatous polyp of large intestine      Breast cancer (H) 2006    Left side     Diabetes (H)      Hx of radiation therapy 2006     Hyperlipidemia      Hypertension      Menopause      S/P mastectomy 12/5/2015     Sleep apnea 6/9/2009      Past Surgical History:   Procedure Laterality Date     BREAST BIOPSY Left 2006     BREAST LUMPECTOMY Left 2006     HYSTERECTOMY  1994     MASTECTOMY Left 12/2015     KS EXCIS BARTHOLIN GLAND/CYST      Description: Excision Of Bartholin's Gland Or Cyst;  Recorded: 07/29/2008;     KS INCISE VESTIBULAR NERVE,TRANSLABYR Left 12/4/2015    Procedure: LEFT LATISSIMUS DORSI FLAP BREAST RECONSTRUCTION WITH GEL IMPLANT;  Surgeon: Harley Abernathy MD;  Location: [facility];  Service:      KS MASTECTOMY, MODIFIED RADICAL Left 12/4/2015    Procedure: LEFT BREAST MASTECTOMY;   Surgeon: Jeferson Rodriguez MD;  Location: [facility] OR;  Service: General     FL TOTAL ABDOM HYSTERECTOMY      Description: Total Abdominal Hysterectomy;  Recorded: 12/30/2009;  Comments: BSO      Family History   Problem Relation Age of Onset     Depression Mother      Diabetes Mother      Heart disease Mother      Hypertension Mother      Stroke Father      Cancer Maternal Grandmother         Thinks it was stomach cancer, but not sure.     Cancer Paternal Aunt 60        Thinks it was stomach cancer     Diabetes Sister      Heart disease Sister       Social History     Socioeconomic History     Marital status:      Spouse name: Not on file     Number of children: 3     Years of education: Not on file     Highest education level: Not on file   Occupational History     Not on file   Social Needs     Financial resource strain: Not on file     Food insecurity:     Worry: Not on file     Inability: Not on file     Transportation needs:     Medical: Not on file     Non-medical: Not on file   Tobacco Use     Smoking status: Never Smoker     Smokeless tobacco: Never Used   Substance and Sexual Activity     Alcohol use: No     Drug use: No     Sexual activity: Not Currently   Lifestyle     Physical activity:     Days per week: Not on file     Minutes per session: Not on file     Stress: Not on file   Relationships     Social connections:     Talks on phone: Not on file     Gets together: Not on file     Attends Rastafarian service: Not on file     Active member of club or organization: Not on file     Attends meetings of clubs or organizations: Not on file     Relationship status: Not on file     Intimate partner violence:     Fear of current or ex partner: Not on file     Emotionally abused: Not on file     Physically abused: Not on file     Forced sexual activity: Not on file   Other Topics Concern     Not on file   Social History Narrative    She reports that she lives in [facility] and in an apartment.   States that she no longer drives.  She reports that [place] is originally [place] has not been out there for nearly 20 years.  She reports that she enjoyed living in [place] taking the train to [facility].  She reports that she would like to exercise and go to the [facility]      Current Outpatient Medications   Medication Sig Dispense Refill     amLODIPine (NORVASC) 5 MG tablet Take 1 tablet (5 mg total) by mouth daily. 90 tablet 3     aspirin 81 MG EC tablet Take 81 mg by mouth daily.       atorvastatin (LIPITOR) 20 MG tablet Take 1 tablet (20 mg total) by mouth daily. 90 tablet 3     cholecalciferol, vitamin D3, 1,000 unit tablet Take 1,000 Units by mouth daily.       lisinopriL (PRINIVIL,ZESTRIL) 40 MG tablet Take 1 tablet (40 mg total) by mouth daily. 90 tablet 3     metFORMIN (GLUCOPHAGE) 500 MG tablet TAKE 2 TABLETS BY MOUTH 2 TIMES A DAY WITH MEALS 360 tablet 3     metoprolol succinate (TOPROL-XL) 100 MG 24 hr tablet Take 1 tablet (100 mg total) by mouth daily. 90 tablet 3     alendronate (FOSAMAX) 70 MG tablet Take 1 tablet (70 mg total) by mouth every 7 days. Take in the morning on an empty stomach with a full glass of water 30 minutes before food 12 tablet 3     loratadine (CLARITIN) 10 mg tablet Take 10 mg by mouth daily.       No current facility-administered medications for this visit.       Objective:   Vital Signs:   Visit Vitals  /70 (Patient Site: Right Arm, Patient Position: Sitting)   Pulse 71   Ht 5' (1.524 m)   Wt 109 lb (49.4 kg)   SpO2 97%   BMI 21.29 kg/m         VisionScreening:  No exam data present     PHYSICAL EXAM  General:  Patient is alert and in no apparent distress.  Neck:  Supple with no adenopathy or thyroid abnormality noted.  Cardiovascular:  Regular rate and rhythm, normal S1/S2, no murmurs, rubs, or gallop.  Pulmonary:  Lungs are clear to auscultation bilaterally with normal respiratory effort.  Gastrointestinal:  Abdomen is soft,  non-tender, non-distended, with no organomegaly, rebound or guarding.  Extremities:  No joint abnormalities with no LE edema.  Strong dorsalis pedis pulses.  She has no sores or ulcerations on her feet.  Good sensation of her feet.  Neurologic Cranial nerves are intact.  No focal deficits.  Psychiatric:  Pleasant, no confusion or agitation   Skin:  Warm and well perfused with no rashes or abnormalities.  Breasts: Normal breast tissue on the right with no masses or abnormalities.  She has a breast implant on the left.        Assessment Results 2/27/2020   Activities of Daily Living No help needed   Instrumental Activities of Daily Living 2-4 - Moderate impairment   Mini Cog Total Score 2   Some recent data might be hidden     A Mini-Cog score of 0-2 suggests the possibility of dementia, score of 3-5 suggests no dementia    Identified Health Risks:     She is at risk for lack of exercise and has been provided with information to increase physical activity for the benefit of her well-being.  The patient was counseled and encouraged to consider modifying their diet and eating habits. She was provided with information on recommended healthy diet options.  The patient reports that she has difficulty with instrumental activities of daily living.  Her children help her with activities of daily living.  The patient was provided with written information regarding signs of hearing loss.  Information regarding advance directives (living garcía), including where she can download the appropriate form, was provided to the patient via the AVS.       The patient was provided with appropriate referrals to address her memory problem.

## 2021-06-09 NOTE — PROGRESS NOTES
Preoperative H&P    Surgeon: Dr. Monet with [facility]   Date Of Surgery: 04/03/17 at [facility]  Procedure: Left Shoulder Arthroplasty    History of Present Illness:  Nalini Sepulveda is a 81 y.o.  female who presents to the office today for a preoperative consultation at the request of Dr. Monet for left shoulder arthroplasty due to OA, increased pain, limiting function and mobility.     She has a history of T2DM, HTN, HLD, breast ca s/p mastectomy     There is no history of anesthesia issues in the past     Denies history or recent abnormal bleeding     Review of Systems:   CV: Chest pain- not noted. Shortness of breath- not noted. Edema- not noted. HILL- not noted. Orthopnea- not noted.   GI: Abdominal pain- not noted. Nausea\vomiting\diarrhea-not noted. Melena or hematochezia- not noted.   : Urgency-not noted. Frequency- not noted. Dysuria- not noted. Hematuria- not noted. Incontinence- not noted.   CNS: Headaches- no significant symptoms. Dizziness- not noted. Visual Changes- not noted.     Physical Examination:    Visit Vitals     /70 (Patient Site: Right Arm, Patient Position: Sitting, Cuff Size: Adult Regular)     Pulse 67     Temp 97.8  F (36.6  C) (Oral)     Ht 5' (1.524 m)     Wt 112 lb 14.4 oz (51.2 kg)     SpO2 100%     BMI 22.05 kg/m2     Wt Readings from Last 3 Encounters:   03/20/17 112 lb 14.4 oz (51.2 kg)   08/17/16 112 lb 6.4 oz (51 kg)   02/29/16 108 lb (49 kg)     Body mass index is 22.05 kg/(m^2). (>25?)    GEN: alert, cooperative, no acute distress  ENT: NCAT. Tympanic membranes: within normal limits. External Canals: within normal limits.   Oropharynx: moist, no lesions, clear. Eyes: PERRLA, Conjunctiva: normal.   Neck: Supple, no adenopathy, no jvd, no abnormal masses. Carotid pulses normal bilateral.  Chest: Within normal limits.  Cardiac: RRR, no rubs, no gallops.  Lungs: Breath Sounds normal, no crackles, no wheezing, no rhonchi.   Abd: Flat, soft,  bowel sounds normal, no organomegaly, no tenderness.  Extr: Warm, no edema.   Skin: No rashes, no significant lesions.    Outpatient Encounter Prescriptions as of 3/20/2017   Medication Sig Dispense Refill     ACCU-CHEK COMPACT TEST Strp USE 2 OR 3 TIMES DAILY AS DIRECTED 102 strip 3     amLODIPine (NORVASC) 5 MG tablet Take 1 tablet (5 mg total) by mouth daily. 90 tablet 3     anastrozole (ARIMIDEX) 1 mg tablet TAKE ONE TABLET BY MOUTH ONCE EVERY DAY  3     aspirin 81 MG EC tablet Take 81 mg by mouth daily.       atenolol (TENORMIN) 50 MG tablet Take 1 tablet (50 mg total) by mouth daily. 90 tablet 2     atorvastatin (LIPITOR) 20 MG tablet Take 1 tablet (20 mg total) by mouth daily. 90 tablet 2     B-complex with vitamin C (VITAMIN B COMPLEX WITH C) cap Take 1 capsule by mouth daily.       blood sugar diagnostic (GLUCOSE BLOOD) Strp Accu-Chek Compact Test Drum In Vitro Strip. Use 2 or 3 times daily as directed.       cholecalciferol, vitamin D3, 1,000 unit tablet Take 1,000 Units by mouth daily.       KRILL OIL ORAL 1000 mg oral capsule.       LANCETS MISC Use As Directed. 2 or 3 times daily.       lisinopril (PRINIVIL,ZESTRIL) 40 MG tablet TAKE ONE TABLET BY MOUTH ONCE EVERY DAY 90 tablet 3     lutein 10 mg Tab Take 10 mg by mouth daily.       metFORMIN (GLUCOPHAGE) 500 MG tablet Take 2 tablets (1,000 mg total) by mouth 2 (two) times a day with meals. 360 tablet 0     MULTIVITAMIN ORAL Take 1 tablet by mouth daily. Tablet.       UBIDECARENONE (CO Q-10 ORAL) Take 1 capsule by mouth daily. CAPS       No facility-administered encounter medications on file as of 3/20/2017.      Past Medical History:   Diagnosis Date     Adenocarcinoma of breast     left     Allergic rhinitis      Arthritis      Benign adenomatous polyp of large intestine      Breast cancer 2006    Left side     Diabetes      Hx of radiation therapy 2006     Hyperlipidemia      Hypertension      Menopause      S/P mastectomy 12/5/2015     Past Surgical  History:   Procedure Laterality Date     BREAST BIOPSY Left 2006     BREAST LUMPECTOMY Left 2006     HYSTERECTOMY  1994     MASTECTOMY Left 12/2015     KS EXCIS BARTHOLIN GLAND/CYST      Description: Excision Of Bartholin's Gland Or Cyst;  Recorded: 07/29/2008;     KS INCISE VESTIBULAR NERVE,TRANSLABYR Left 12/4/2015    Procedure: LEFT LATISSIMUS DORSI FLAP BREAST RECONSTRUCTION WITH GEL IMPLANT;  Surgeon: Harley Abernathy MD;  Location: [facility];  Service:      KS MASTECTOMY, MODIFIED RADICAL Left 12/4/2015    Procedure: LEFT BREAST MASTECTOMY;  Surgeon: Jeferson Rodriguez MD;  Location: [facility];  Service: General     KS TOTAL ABDOM HYSTERECTOMY      Description: Total Abdominal Hysterectomy;  Recorded: 12/30/2009;  Comments: BSO     Social History   Substance Use Topics     Smoking status: Never Smoker     Smokeless tobacco: Never Used     Alcohol use No       Diagnoses:     Encounter Diagnoses   Name Primary?     Preoperative examination Yes     Osteoarthritis of left shoulder, unspecified osteoarthritis type      Type 2 diabetes mellitus         Preoperative examination for total left shoulder arthroplasty due to severe osteoarthritis of left shoulder  - Electrocardiogram Perform and Read  - HM2(CBC w/o Differential)  - Basic Metabolic Panel    Type 2 diabetes mellitus  - Glycosylated Hemoglobin A1c    Discussion-Plan:     Labs: CBC, BMP    EKG: NSR, no significant issues noted. Reviewed with her in clinic     WBC 4.0 - 11.0 thou/uL 9.4   RBC 3.80 - 5.40 mill/uL 3.49 (L)   Hemoglobin 12.0 - 16.0 g/dL 11.4 (L)   Hematocrit 35.0 - 47.0 % 33.9 (L)   MCV 80 - 100 fL 97   MCH 27.0 - 34.0 pg 32.6   MCHC 32.0 - 36.0 g/dL 33.5   RDW 11.0 - 14.5 % 11.3   Platelets 140 - 440 thou/uL 319   MPV 7.0 - 10.0 fL 8.7     Labs reviewed Hemoglobin 11.4, increased from previous     Sodium 136 - 145 mmol/L 138   Potassium 3.5 - 5.0 mmol/L 4.7   Chloride 98 - 107 mmol/L 104   CO2 22 - 31 mmol/L 24   Anion  Gap, Calculation 5 - 18 mmol/L 10   Glucose 70 - 125 mg/dL 181 (H)   Calcium 8.5 - 10.5 mg/dL 9.6   BUN 8 - 28 mg/dL 22   Creatinine 0.60 - 1.10 mg/dL 0.89   GFR MDRD Af Amer >60 mL/min/1.73m2 >60   GFR MDRD Non Af Amer >60 mL/min/1.73m2 >60     Renal and Lytes within normal limits    -Prophylaxis for cardiac events with perioperative beta-blockers: clinical risk factors not indicated.  -Avoidance of; Aspirin, Ibuprofen, Naproxen, and other NSAIDS 7 days prior to surgery. To call with any changes in present status  -can take daily meds with a sip of water morning of surgery     Cardiac Risk Estimation: RCRI for planned surgery is < 1%    Clinical Risk Factors:      -Cardiac: No recent hx of MI, valvular disease, CHF or abnormal EKG     -Metabolic/Endocrine: h/o DM, A1c 8.1 today. Takes metformin 1000 mg BID. Continue as prescribed. F/u with PCP . No history of CKD stage 3 or above    -Neurologic: no hx of CVA    Functional Capacity: > 4 mets: able to climb stairs and walk 1-2 blocks w/o stopping    Procedure Risk: Intermediate     Presently Clinically Stable for Scheduled Surgery. No contraindications to planned surgery and difficulty with intubation is not anticipated    Tavares Westfall MD

## 2021-06-10 NOTE — PROGRESS NOTES
"[facility] Note  Patient Name: Nalini Sepulveda  Patient Age: 81 y.o.  YOB: 1935  MRN: [id number]  ?  Date of Visit: 4/10/2017  Reason for Office Visit:   Chief Complaint   Patient presents with     Shoulder Pain     just not feeling well after lt shoulder surgery, no appetite, fatigue       HPI: Nalini Sepulveda 81 y.o. female with a history of T2DM, h/o breast ca s/p mastectomy, HTN, who presents to clinic with a complaint \"not feeling well after surgery\". She had total left shoulder arthroplasty surgery on 4/3 at [facility]. For past week has been feeling bad. Poor appetite. Increased fatigue. Has been off pain meds for 3-4 days. Her daughter is here with her today and reports her mother has not been eating well since surgery, due to poor appetite. She is weaker than normal, just wanting to sleep more often. She denies pain. Has not been vomiting. No headaches or dizziness. No fevers/chills.       Review of Systems: As noted in HPI     Current Scheduled Meds:  Outpatient Encounter Prescriptions as of 4/10/2017   Medication Sig Dispense Refill     ACCU-CHEK COMPACT TEST Strp USE 2 OR 3 TIMES DAILY AS DIRECTED 102 strip 3     amLODIPine (NORVASC) 5 MG tablet Take 1 tablet (5 mg total) by mouth daily. 90 tablet 3     anastrozole (ARIMIDEX) 1 mg tablet TAKE ONE TABLET BY MOUTH ONCE EVERY DAY  3     aspirin 81 MG EC tablet Take 81 mg by mouth daily.       atenolol (TENORMIN) 50 MG tablet Take 1 tablet (50 mg total) by mouth daily. 90 tablet 2     atorvastatin (LIPITOR) 20 MG tablet Take 1 tablet (20 mg total) by mouth daily. 90 tablet 2     B-complex with vitamin C (VITAMIN B COMPLEX WITH C) cap Take 1 capsule by mouth daily.       blood sugar diagnostic (GLUCOSE BLOOD) Strp Accu-Chek Compact Test Drum In Vitro Strip. Use 2 or 3 times daily as directed.       cholecalciferol, vitamin D3, 1,000 unit tablet Take 1,000 Units by mouth daily.       KRILL OIL ORAL 1000 mg oral " capsule.       LANCETS MISC Use As Directed. 2 or 3 times daily.       lisinopril (PRINIVIL,ZESTRIL) 40 MG tablet TAKE ONE TABLET BY MOUTH ONCE EVERY DAY 90 tablet 3     lutein 10 mg Tab Take 10 mg by mouth daily.       metFORMIN (GLUCOPHAGE) 500 MG tablet Take 2 tablets (1,000 mg total) by mouth 2 (two) times a day with meals. 360 tablet 0     MULTIVITAMIN ORAL Take 1 tablet by mouth daily. Tablet.       UBIDECARENONE (CO Q-10 ORAL) Take 1 capsule by mouth daily. CAPS       No facility-administered encounter medications on file as of 4/10/2017.        Objective / Physical Examination:  /62 (Patient Site: Right Arm, Patient Position: Sitting, Cuff Size: Adult Regular)  Pulse 64  Temp 98.7  F (37.1  C)  Wt 114 lb 9.6 oz (52 kg)  BMI 22.38 kg/m2  Wt Readings from Last 3 Encounters:   04/10/17 114 lb 9.6 oz (52 kg)   03/20/17 112 lb 14.4 oz (51.2 kg)   08/17/16 112 lb 6.4 oz (51 kg)     Body mass index is 22.38 kg/(m^2). (>25?)    General Appearance: Alert, but appears very fatigued, often closing eyes during interview   Eyes: Conjunctivae clear and sclerae non-icteric  Nose: mucosa moist and without drainage  Throat: Lips and mucosa dry. pharynx without erythema or exudate  Neck: Supple, trachea midline. No cervical adenopathy.  Lungs: Clear to auscultation bilaterally. Normal inspiratory and expiratory effort.   Cardiovascular: RRR  Abdomen: Bowel sounds active all four quadrants. Soft, non-tender.  Extremities: No edema. Strength equal throughout.  Integumentary: Warm and dry. Left shoulder c/d/i. No swelling, erythema around surgical site  Neuro: Alert and oriented, follows commands appropriately. Gait normal     Assessment / Plan / Medical Decision Making:      Encounter Diagnoses   Name Primary?     Fatigue Yes     Loss of appetite      Hyponatremia      Leukocytosis      S/p reverse total shoulder arthroplasty, left         1. Fatigue    S/p left shoulder arthroplasty in the setting of anemia and  hyponatremia. On exam, appears quite somnolent but hemodynamically stable and afebrile. Labs obtained as below.     - HM2(CBC w/o Differential)  - Comprehensive Metabolic Panel  - HM1(CBC and Differential)  - HM1 (CBC with Diff)    2. Loss of appetite    3. Hyponatremia  , corrected to 128 in the setting of hyperglycemia. I suspect this is post op stress provoking SIADH vs hypovolemic hyponatremia. Admit for osms and treat accordingly     4. Leukocytosis  WBC 19.6 with left shift. Suspect reactive inflammation 2/2 to surgery but cannot rule out infection. Reassuring she was afebrile and hemodynamically stable ruling out SIRS     5. S/p reverse total shoulder arthroplasty, left    6. Anemia   Normocytic anemia likely 2/2 to blood loss from surgery. Would benefit from iron replacement, monitoring and possible infusion     Recommend direct admit for monitoring, sodium correction, etc. I called [facility] and secured a bed.      Total time spent with patient was 20 minutes with >50% of time spent in face-to-face counseling regarding the above plan     Tavares Westfall MD  [facility]

## 2021-06-10 NOTE — PROGRESS NOTES
ASSESSMENT and PLAN:  1. Hyponatremia  Last Na prior to d/c was 130, so improving.  Energy level was a little lower so far this week since d/c.  We'll recheck.  No significant pain or nausea.  We discussed cutting lisinopril in half to help w/ energy level.  - Comprehensive Metabolic Panel    2. Leukocytosis  Presumed reactive.  No focalizing sx of infection at this time.  We'll repeat.    - HM2(CBC w/o Differential)    Patient Instructions   Today's labs will be available on [facility].  If you aren't signed up, then we'll mail them out.  If anything needs more immediate follow up, we'll also call.  Please see me again next week for follow up.  Take care!        I have reconciled all medications.     CHIEF COMPLAINT:  Chief Complaint   Patient presents with     Hospital Visit Follow Up     WW, pt complained of weakness and no appetite        HISTORY OF PRESENT ILLNESS:  Nalini Sepulveda is a 81 y.o. female presenting to the clinic today for a hospital follow up. She was admitted on 4/11/17 for hyponatremia. She was contacted after discharge on 4/15/17 and was advised to follow up with her PCP 14 days post discharge. She is accompanied by her son. Her surgery went well, but afterwards she began to feel terrible. She was feeling very weak and still does not feel normal. She feels the same way now as she felt in the hospital. Her son also does not believe that there has been much of a difference. Her son noticed that she had more energy Saturday and Sunday, but on the other days she was very lethargic and tired. Her ROM in her shoulder is still very restricted. Physical therapy was done in the hospital, and now she has an aide that is coming to her house. She is taking all of her medications. Her appetite has decreased. She is eating, but her meals tend to be small. She does not like Boost or Ensure because they are too sweet.     REVIEW OF SYSTEMS:   She denies coughing. She denies urinary symptoms apart from urinary  frequency, but that is normal for her. She denies shortness of breath. Her stomach is not bothering her. She denies nausea. Her back bothers her periodically, but her son believes that it could possibly due to posture. She always feels cold. All other systems are negative.    TOBACCO USE:  History   Smoking Status     Never Smoker   Smokeless Tobacco     Never Used       VITALS:  Vitals:    04/18/17 1201   BP: 104/48   Patient Site: Right Arm   Pulse: 76   Weight: 110 lb 3 oz (50 kg)     Wt Readings from Last 3 Encounters:   04/18/17 110 lb 3 oz (50 kg)   04/15/17 109 lb 5 oz (49.6 kg)   04/10/17 114 lb 9.6 oz (52 kg)     PHYSICAL EXAM:  Constitutional:  Reveals an alert, pleasant elderly female.   Vitals:  Noted.   Lungs: Clear to auscultation bilaterally, respirations unlabored.   Heart: Regular rate and rhythm, S1 and S2 normal, no murmur, rub, or gallop,   Neurologic: Normal     ADDITIONAL HISTORY SUMMARIZED (2): Reviewed discharge summary from 4/15/17 regarding hyponatremia.   DECISION TO OBTAIN EXTRA INFORMATION (1): None.   RADIOLOGY TESTS (1): None.  LABS (1): Reviewed labs from 4/15/17. Ordered labs.   MEDICINE TESTS (1): None.  INDEPENDENT REVIEW (2 each): None.     The visit lasted a total of 10 minutes face to face with the patient. Over 50% of the time was spent counseling and educating the patient about fatigue.    I, Cleopatra Thomson, am scribing for and in the presence of, Dr. Aletha Silva.    I, Dr. Aletha Silva, personally performed the services described in this documentation, as scribed by Cleopatra Thomson in my presence, and it is both accurate and complete.    MEDICATIONS:  Current Outpatient Prescriptions   Medication Sig Dispense Refill     ACCU-CHEK COMPACT TEST Strp USE 2 OR 3 TIMES DAILY AS DIRECTED 102 strip 3     amLODIPine (NORVASC) 5 MG tablet Take 5 mg by mouth at bedtime.       anastrozole (ARIMIDEX) 1 mg tablet Take 1 mg by mouth daily.       aspirin 81 MG EC tablet Take 81 mg  by mouth daily.       atenolol (TENORMIN) 50 MG tablet Take 50 mg by mouth daily.       atorvastatin (LIPITOR) 20 MG tablet Take 20 mg by mouth at bedtime.       B-complex with vitamin C (VITAMIN B COMPLEX WITH C) cap Take 1 capsule by mouth daily.       blood sugar diagnostic (GLUCOSE BLOOD) Strp Accu-Chek Compact Test Drum In Vitro Strip. Use 2 or 3 times daily as directed.       cholecalciferol, vitamin D3, 1,000 unit tablet Take 1,000 Units by mouth daily.       KRILL OIL ORAL Take 1 capsule by mouth daily. 1000 mg oral capsule.        LANCETS MISC Use As Directed. 2 or 3 times daily.       lisinopril (PRINIVIL,ZESTRIL) 40 MG tablet Take 40 mg by mouth daily.       metFORMIN (GLUCOPHAGE) 500 MG tablet Take 1,000 mg by mouth 2 (two) times a day with meals.       MULTIVITAMIN ORAL Take 1 tablet by mouth daily. Tablet.       lutein 10 mg Tab Take 10 mg by mouth daily.       UBIDECARENONE (CO Q-10 ORAL) Take 1 capsule by mouth daily. CAPS       No current facility-administered medications for this visit.        Total data points: 3

## 2021-06-10 NOTE — PROGRESS NOTES
ASSESSMENT and PLAN:  1. Hyponatremia  We'll recheck today.  She's feeling better and eating three meals daily now.  - Basic Metabolic Panel    2. Leukocytosis  No sx of infection.  She's feeling better.  Exam non-focal.  Labs today.  - HM1(CBC and Differential)  - Morphology,Smear Review (MORP)  - HM1 (CBC with Diff)    Patient Instructions   Today's labs will be available on CS[facility].  If you aren't signed up, then we'll mail them out.  If anything needs more immediate follow up, we'll also call.  If everything is going well, let's plan on a six week follow up.  Take care!        CHIEF COMPLAINT:  Chief Complaint   Patient presents with     Follow-up     Low sodium levels, still feeling fatigued       HISTORY OF PRESENT ILLNESS:  Nalini Sepulveda is a 81 y.o. female  presenting to the clinic today for hyponatremia.  She is accompanied by her son. She is still feeling fatigued significantly more today than the other days. However she had physical therapy today. Therapy today was different than what she was used to. It appears as though physical therapy contributed to her fatigue today. Her previous labs from 4/18/17 indicated that her sodium levels were low. Her levels were rechecked today.     Type 2 Diabetes: Her blood sugars were checked occasionally and ranged between 143 and 148. The highest blood sugar level was 163. There have not been any above 200.     REVIEW OF SYSTEMS:   She denies cough or urinary symptoms. She no longer needs help to go to the bathroom. She denies lower extremity swelling. All other systems are negative.    TOBACCO USE:  History   Smoking Status     Never Smoker   Smokeless Tobacco     Never Used       VITALS:  Vitals:    04/26/17 1315   BP: 110/58   Patient Site: Right Arm   Pulse: 80   Resp: 12     Wt Readings from Last 3 Encounters:   04/18/17 110 lb 3 oz (50 kg)   04/15/17 109 lb 5 oz (49.6 kg)   04/10/17 114 lb 9.6 oz (52 kg)     PHYSICAL EXAM:  Constitutional:  Reveals an alert,  pleasant elderly female.   Vitals:  Noted.   Lungs: Clear to auscultation bilaterally, respirations unlabored.   Heart: Regular rate and rhythm, S1 and S2 normal, no murmur, rub, or gallop,   Abdomen: Soft, non-tender, bowel sounds active all four quadrants, no masses, no organomegaly   Extremities: Extremities normal, atraumatic, no cyanosis or edema   Neurologic: Normal     ADDITIONAL HISTORY SUMMARIZED (2): None.  DECISION TO OBTAIN EXTRA INFORMATION (1): None.   RADIOLOGY TESTS (1): None.  LABS (1): Reviewed labs from 4/18/17.   MEDICINE TESTS (1): None.  INDEPENDENT REVIEW (2 each): None.     The visit lasted a total of 12 minutes face to face with the patient. Over 50% of the time was spent counseling and educating the patient about fatigue.    I, Cleopatra Thomson, am scribing for and in the presence of, Dr. Aletha Silva.    I, Dr. Aletha Silva, personally performed the services described in this documentation, as scribed by Cleopatra Thomson in my presence, and it is both accurate and complete.    MEDICATIONS:  Current Outpatient Prescriptions   Medication Sig Dispense Refill     ACCU-CHEK COMPACT TEST Strp USE 2 OR 3 TIMES DAILY AS DIRECTED 102 strip 3     amLODIPine (NORVASC) 5 MG tablet Take 5 mg by mouth at bedtime.       anastrozole (ARIMIDEX) 1 mg tablet Take 1 mg by mouth daily.       aspirin 81 MG EC tablet Take 81 mg by mouth daily.       atenolol (TENORMIN) 50 MG tablet Take 50 mg by mouth daily.       atorvastatin (LIPITOR) 20 MG tablet Take 20 mg by mouth at bedtime.       B-complex with vitamin C (VITAMIN B COMPLEX WITH C) cap Take 1 capsule by mouth daily.       blood sugar diagnostic (GLUCOSE BLOOD) Strp Accu-Chek Compact Test Drum In Vitro Strip. Use 2 or 3 times daily as directed.       cholecalciferol, vitamin D3, 1,000 unit tablet Take 1,000 Units by mouth daily.       KRILL OIL ORAL Take 1 capsule by mouth daily. 1000 mg oral capsule.        LANCETS MISC Use As Directed. 2 or 3 times  daily.       lisinopril (PRINIVIL,ZESTRIL) 40 MG tablet Take 40 mg by mouth daily.       lutein 10 mg Tab Take 10 mg by mouth daily.       metFORMIN (GLUCOPHAGE) 500 MG tablet Take 1,000 mg by mouth 2 (two) times a day with meals.       MULTIVITAMIN ORAL Take 1 tablet by mouth daily. Tablet.       UBIDECARENONE (CO Q-10 ORAL) Take 1 capsule by mouth daily. CAPS       No current facility-administered medications for this visit.        Total data points: 1

## 2021-06-11 NOTE — PROGRESS NOTES
ASSESSMENT and PLAN:  1. Urinary incontinence  We discussed options; she'll try  meds first  - Urinalysis-UC if Indicated  - tolterodine (DETROL LA) 2 MG ER capsule; Take 1 capsule (2 mg total) by mouth daily.  Dispense: 30 capsule; Refill: 3  - Culture, Urine    2. Type 2 diabetes mellitus  Uncertain degree of control; labs today  - Glycosylated Hemoglobin A1c  - Basic Metabolic Panel    3. Unsteady gait  She's interested in PT; she'll check w/ family on the best location and get back to me.    There are no discontinued medications.    Return in about 3 months (around 10/11/2017).    Patient Instructions   Labs today.  I sent in the new medication for your bladder to the [facility] on [address].  Let me know if you'd like PT here or at [facility] for your balance.        CHIEF COMPLAINT:  Chief Complaint   Patient presents with     Urinary Incontinence     Small amount of pain and burning when urinating       HISTORY OF PRESENT ILLNESS:  Nalini Sepulveda is a 81 y.o. female  presenting to the clinic today for urinary incontience and f/u of her DM2.    Her urinary sx have been present for a long time, but gradually have gotten worse.  Her main sx is urgency.  She occasionally has some burning, but it doesn't feel like a UTI to her.  She's most bothered during the day.  She gets up 2-3x/night to urinate.  She denies stress incontinence or dribbling.  She uses pads daily.  She had a hysterectomy in 2009 for incontinence.      She's also f/u on her DM2.  She checks her sugars QAM.  They run 140s-160s.  She attributes her numbers to diet indiscretion.  She denies any hypos.  She's taking metformin 1000 mg bid.    She mentions that she's been feeling off balance since her shoulder surgery earlier this year.  She denies palpitations.      REVIEW OF SYSTEMS:   CV: occasional LE edema   All other systems are negative.    PFSH:  She's staying w/ her kids right now      TOBACCO USE:  History   Smoking Status     Never  Smoker   Smokeless Tobacco     Never Used       VITALS:  Vitals:    07/11/17 1144   BP: 112/60   Patient Site: Right Arm   Pulse: 80   Weight: 107 lb 9 oz (48.8 kg)     Wt Readings from Last 3 Encounters:   07/11/17 107 lb 9 oz (48.8 kg)   04/18/17 110 lb 3 oz (50 kg)   04/15/17 109 lb 5 oz (49.6 kg)         PHYSICAL EXAM:  Constitutional:  Reveals an alert, pleasant elderly female.   Vitals:  Noted.   Head: Normocephalic, without obvious abnormality, atraumatic   Lungs: Clear to auscultation bilaterally, respirations unlabored.   Heart: Regular rate and rhythm, S1 and S2 normal, no murmur, rub, or gallop,   Abdomen: non-distended  Extremities: Extremities normal, atraumatic, no cyanosis or edema   Skin: Skin color, texture, turgor normal, no rashes or lesions       MEDICATIONS:  Current Outpatient Prescriptions   Medication Sig Dispense Refill     ACCU-CHEK COMPACT TEST Strp USE 2 OR 3 TIMES DAILY AS DIRECTED 102 strip 3     amLODIPine (NORVASC) 5 MG tablet Take 5 mg by mouth at bedtime.       anastrozole (ARIMIDEX) 1 mg tablet Take 1 mg by mouth daily.       aspirin 81 MG EC tablet Take 81 mg by mouth daily.       atenolol (TENORMIN) 50 MG tablet Take 50 mg by mouth daily.       atorvastatin (LIPITOR) 20 MG tablet Take 20 mg by mouth at bedtime.       B-complex with vitamin C (VITAMIN B COMPLEX WITH C) cap Take 1 capsule by mouth daily.       blood sugar diagnostic (GLUCOSE BLOOD) Strp Accu-Chek Compact Test Drum In Vitro Strip. Use 2 or 3 times daily as directed.       cholecalciferol, vitamin D3, 1,000 unit tablet Take 1,000 Units by mouth daily.       KRILL OIL ORAL Take 1 capsule by mouth daily. 1000 mg oral capsule.        LANCETS MISC Use As Directed. 2 or 3 times daily.       lisinopril (PRINIVIL,ZESTRIL) 40 MG tablet Take 40 mg by mouth daily.       lutein 10 mg Tab Take 10 mg by mouth daily.       metFORMIN (GLUCOPHAGE) 500 MG tablet Take 2 tablets (1,000 mg total) by mouth 2 (two) times a day with meals.  360 tablet 0     MULTIVITAMIN ORAL Take 1 tablet by mouth daily. Tablet.       UBIDECARENONE (CO Q-10 ORAL) Take 1 capsule by mouth daily. CAPS       tolterodine (DETROL LA) 2 MG ER capsule Take 1 capsule (2 mg total) by mouth daily. 30 capsule 3     No current facility-administered medications for this visit.

## 2021-06-12 NOTE — PROGRESS NOTES
Office Visit   Nalini Sepulveda   84 y.o. female    Date of Visit: 10/20/2020    Chief Complaint   Patient presents with     Follow-up     Diabetes     Low Back Pain     For 2.5 weeks        Assessment and Plan   1. Type 2 diabetes mellitus without complication, without long-term current use of insulin (H)  She is due for hemoglobin A1c today.  Blood pressure is well controlled.  She has not had any cardiac symptoms.  We will get back to her with her test results.  - Glycosylated Hemoglobin A1c  - Thyroid [place]  - Comprehensive Metabolic Panel  - Vitamin B12    2. Hypertension  Blood pressure is normal today on her medications.  - Thyroid [place]  - Comprehensive Metabolic Panel  - Vitamin B12    3. Chronic midline back pain, unspecified back location  Her back pain is of unclear etiology.  I am going to get an x-ray today.  I recommended that she start taking Tylenol 3 times a day for pain control.  I am also going to schedule her with physical therapy to work on strength.  If she is not improving over the next 6 weeks or so she will let me know.  - acetaminophen (TYLENOL EXTRA STRENGTH) 500 MG tablet; Take 2 tablets (1,000 mg total) by mouth 3 (three) times a day.  Dispense: 100 tablet; Refill: 2  - mirtazapine (REMERON) 7.5 MG tablet; Take 1 tablet (7.5 mg total) by mouth at bedtime.  Dispense: 90 tablet; Refill: 3  - XR Lumbar Spine 2 or 3 VWS  - Ambulatory referral to Adult PT- External  - Urinalysis-UC if Indicated    4. Fatigue, unspecified type  She has had trouble with sleep and fatigue.  Her daughter feels that because of that her memory is not as good.  I am going to start her on mirtazapine at bedtime to see if that helps with sleep.  She is also had a little bit of weight loss and a decreased appetite.  We will check her blood work.  She is going to be due for a physical exam in 3 to 4 months and we will set that up.  In the meantime if she is not improving or her symptoms worsen they will let me know  and may need further evaluation.  - mirtazapine (REMERON) 7.5 MG tablet; Take 1 tablet (7.5 mg total) by mouth at bedtime.  Dispense: 90 tablet; Refill: 3  - HM2(CBC w/o Differential)  - Vitamin B12  - Urinalysis-UC if Indicated    5. Needs flu shot  - Influenza,Quad,High Dose,PF 65 YR+         Return in about 4 months (around 2/20/2021) for Routine preventive.     History of Present Illness   This 84 y.o. old female comes in due to several concerns today.  She is due for hemoglobin A1c and has diabetes.  She has not had any chest pain or palpitations or other cardiac symptoms.  Blood pressure is well controlled.  She notes that about 2-1/2 to 3 weeks ago she was moving some boxes and since then has had low back pain.  It is been bothering her throughout the day and at night.  It does not seem to be improving.  She is not having any leg weakness or numbness.  Does not feel that she is sleeping well and her daughter feels that because of that her memory is worsening.  She has been quite tired.  She does not have any other new concerns today.    Review of Systems: As above, systems otherwise reviewed and negative.     Medications, Allergies and Problem List   Patient Active Problem List   Diagnosis     Type 2 diabetes mellitus (H)     Mixed hyperlipidemia     Hypertension     Benign Adenomatous Polyp Of The Large Intestine     Allergic Rhinitis     Adenocarcinoma Of The Breast     S/P mastectomy     Sleep apnea     Osteoporosis without current pathological fracture, unspecified osteoporosis type     Current Outpatient Medications   Medication Sig Dispense Refill     alendronate (FOSAMAX) 70 MG tablet Take 1 tablet (70 mg total) by mouth every 7 days. Take in the morning on an empty stomach with a full glass of water 30 minutes before food 12 tablet 3     amLODIPine (NORVASC) 5 MG tablet Take 1 tablet (5 mg total) by mouth daily. 90 tablet 3     aspirin 81 MG EC tablet Take 81 mg by mouth daily.       atorvastatin  (LIPITOR) 20 MG tablet Take 1 tablet (20 mg total) by mouth daily. 90 tablet 3     b complex vitamins tablet Take 1 tablet by mouth daily.       cholecalciferol, vitamin D3, 1,000 unit tablet Take 1,000 Units by mouth daily.       lisinopriL (PRINIVIL,ZESTRIL) 40 MG tablet Take 1 tablet (40 mg total) by mouth daily. 90 tablet 3     metFORMIN (GLUCOPHAGE) 500 MG tablet TAKE 2 TABLETS BY MOUTH 2 TIMES A DAY WITH MEALS 360 tablet 3     metoprolol succinate (TOPROL-XL) 100 MG 24 hr tablet Take 1 tablet (100 mg total) by mouth daily. 90 tablet 3     multivitamin therapeutic tablet Take 1 tablet by mouth daily.       acetaminophen (TYLENOL EXTRA STRENGTH) 500 MG tablet Take 2 tablets (1,000 mg total) by mouth 3 (three) times a day. 100 tablet 2     loratadine (CLARITIN) 10 mg tablet Take 10 mg by mouth daily.       mirtazapine (REMERON) 7.5 MG tablet Take 1 tablet (7.5 mg total) by mouth at bedtime. 90 tablet 3     No current facility-administered medications for this visit.      Allergies   Allergen Reactions     Codeine Hives and Itching          Physical Exam     /52 (Patient Site: Left Arm, Patient Position: Sitting)   Pulse 78   Ht 5' (1.524 m)   Wt 103 lb (46.7 kg)   SpO2 99%   BMI 20.12 kg/m      General:  Patient is alert and in no apparent distress.  She is slow to get to the examining table.  Cardiovascular:  Regular rate and rhythm, normal S1/S2, no murmurs, rubs, or gallop.  Pulmonary:  Lungs are clear to auscultation bilaterally with normal respiratory effort.  Gastrointestinal:  Abdomen is soft, non-tender, non-distended, with no organomegaly, rebound or guarding.  Back: No pain with palpation along the spine.  Negative straight leg raise.  No reproducible pain.           Additional Information   Social History     Tobacco Use     Smoking status: Never Smoker     Smokeless tobacco: Never Used   Substance Use Topics     Alcohol use: No     Drug use: No            Didi Dunbar MD

## 2021-06-13 NOTE — TELEPHONE ENCOUNTER
Please call her daughter and find out more details.  Has this changed or worsened since I saw her in October?  Is she really concerned about this?  If so, I can see her next week sometime.  Otherwise, they can monitor.  I'm schedule to see her in a couple of months.  Thanks.

## 2021-06-14 NOTE — PROGRESS NOTES
Office Visit   Nalini Sepulveda   85 y.o. female    Date of Visit: 12/29/2020    Chief Complaint   Patient presents with     Follow-up     2 week follow up- edema feet and ankles- wondering about compression stockings     Memory Loss        Assessment and Plan   1. Memory loss  She was seen last March through a virtual visit in neurology.  Unfortunately those at the beginning of the pandemic and no further testing was done.  This continues to be a concern and she is interested in reevaluation with a new neurologist.  I am going to go ahead and set that up.  - Ambulatory referral to Neurology    2. Sleep disorder  She did have improvement with Remeron but unfortunately developed significant lower extremity edema.  That has resolved after she stopped the Remeron.  We discussed neck steps and she is going to try over-the-counter melatonin to see if that is helpful.  We also discussed the option of low-dose trazodone in the future and if she decides to try that she will let me know.       Return in about 4 months (around 4/29/2021) for Routine preventive.     History of Present Illness   This 85 y.o. old female comes into follow-up.  She was having difficulty with weight loss and sleep and couple months ago we started Remeron.  That did help with her appetite and her sleep but unfortunately she developed significant lower extremity edema.  I saw her 2 weeks ago for that and we stopped the Remeron.  Her edema has resolved but now she is having trouble with sleep again.  At this point she is interested in trying over-the-counter melatonin to see if that is helpful.  If not then we did discuss next steps at perhaps trying trazodone.  She will let me know if she would like to try that in the future.  In addition she has had significant problems with memory and was evaluated last March by neurology.  Unfortunately it was right at the beginning of the COVID-19 pandemic and no further evaluation was completed.  She is  interested in setting up a reevaluation and I will order a new consultation.    Review of Systems: As above, systems otherwise reviewed and negative.     Medications, Allergies and Problem List   Patient Active Problem List   Diagnosis     Type 2 diabetes mellitus (H)     Mixed hyperlipidemia     Hypertension     Benign Adenomatous Polyp Of The Large Intestine     Allergic Rhinitis     Adenocarcinoma Of The Breast     S/P mastectomy     Sleep apnea     Osteoporosis without current pathological fracture, unspecified osteoporosis type     Current Outpatient Medications   Medication Sig Dispense Refill     acetaminophen (TYLENOL EXTRA STRENGTH) 500 MG tablet Take 2 tablets (1,000 mg total) by mouth 3 (three) times a day. 100 tablet 2     alendronate (FOSAMAX) 70 MG tablet Take 1 tablet (70 mg total) by mouth every 7 days. Take in the morning on an empty stomach with a full glass of water 30 minutes before food 12 tablet 3     amLODIPine (NORVASC) 5 MG tablet Take 1 tablet (5 mg total) by mouth daily. 90 tablet 3     aspirin 81 MG EC tablet Take 81 mg by mouth daily.       atorvastatin (LIPITOR) 20 MG tablet Take 1 tablet (20 mg total) by mouth daily. 90 tablet 3     b complex vitamins tablet Take 1 tablet by mouth daily.       cholecalciferol, vitamin D3, 1,000 unit tablet Take 1,000 Units by mouth daily.       lisinopriL (PRINIVIL,ZESTRIL) 40 MG tablet Take 1 tablet (40 mg total) by mouth daily. 90 tablet 3     loratadine (CLARITIN) 10 mg tablet Take 10 mg by mouth daily.       metFORMIN (GLUCOPHAGE) 500 MG tablet TAKE 2 TABLETS BY MOUTH 2 TIMES A DAY WITH MEALS 360 tablet 3     metoprolol succinate (TOPROL-XL) 100 MG 24 hr tablet Take 1 tablet (100 mg total) by mouth daily. 90 tablet 3     multivitamin therapeutic tablet Take 1 tablet by mouth daily.       No current facility-administered medications for this visit.      Allergies   Allergen Reactions     Codeine Hives and Itching          Physical Exam     /74  (Patient Site: Left Arm, Patient Position: Sitting)   Pulse 78   Temp 97.1  F (36.2  C)   Ht 5' (1.524 m)   Wt 106 lb (48.1 kg)   SpO2 100%   BMI 20.70 kg/m      General: This is an alert female, sitting comfortably, no apparent distress.  Extremities: No lower extremity edema is noted today.     Additional Information   Social History     Tobacco Use     Smoking status: Never Smoker     Smokeless tobacco: Never Used   Substance Use Topics     Alcohol use: No     Drug use: No          Didi Dunbar MD

## 2021-06-14 NOTE — TELEPHONE ENCOUNTER
Refill Approved    Rx renewed per Medication Renewal Policy. Medication was last renewed on 2/27/20, last OV 12/29/20.    Kim George, [facility] Triage/Med Refill 1/31/2021     Requested Prescriptions   Pending Prescriptions Disp Refills     alendronate (FOSAMAX) 70 MG tablet 12 tablet 3     Sig: Take 1 tablet (70 mg total) by mouth every 7 days. Take in the morning on an empty stomach with a full glass of water 30 minutes before food       Biphosphonates Refill Protocol Passed - 1/28/2021  9:43 AM        Passed - PCP or prescribing provider visit in last 12 months     Last office visit with prescriber/PCP: 12/29/2020 Didi Dunbar MD OR same dept: 12/29/2020 Didi Dunbar MD OR same specialty: 12/29/2020 Didi Dunbar MD  Last physical: 2/27/2020 Last MTM visit: Visit date not found   Next visit within 3 mo: Visit date not found  Next physical within 3 mo: Visit date not found  Prescriber OR PCP: Didi Dunbar MD  Last diagnosis associated with med order: 1. Osteoporosis without current pathological fracture, unspecified osteoporosis type  - alendronate (FOSAMAX) 70 MG tablet; Take 1 tablet (70 mg total) by mouth every 7 days. Take in the morning on an empty stomach with a full glass of water 30 minutes before food  Dispense: 12 tablet; Refill: 3    If protocol passes may refill for 12 months if within 3 months of last provider visit (or a total of 15 months).             Passed - Serum creatinine in last 12 months     Creatinine   Date Value Ref Range Status   12/15/2020 0.99 0.60 - 1.10 mg/dL Final

## 2021-06-14 NOTE — PROGRESS NOTES
Clinic Note    Assessment:     Assessment and Plan:    1. Hyperglycemia    Recheck A1c today and microalbumin is due. Her last a1c was at goal. No side effects with medications.     - Glycosylated Hemoglobin A1c  - Microalbumin, Random Urine    2. Dysuria    Pt reports hx of UTIs, having some burning with urination over past two weeks. Check a UA today.     - Urinalysis-UC if Indicated       Patient Instructions   We will call you with the results of the urine test today to let you know if you need to be on antibiotics.     If your A1c is normal we will have you follow up in six months; we will call you about this result.     We like your fasting numbers to be less than 130    We like two hours after you eat to be less than 180.              Subjective:      Nalini Sepulveda is a 82 y.o. female who comes to the clinic for a diabetes check. She says that she has been checking her sugars every day in the morning after fasting and they are in the 160s; she never remembers to check two hours after eating.     She is currently using 500 mg of metformin twice daily. She says that she forgets to take a dose about once per week. She denies every having problems with hypoglycemia.     Diet and exercise: patient says that she has been losing weight, she says that she does not have a great appetite. She says that she likes to walk her dog for exercise.     Last eye appointment was about one year ago; she says that she has one coming up.     She says that she does not have any neuropathy, she checks her feet when they hurt.     She says that she has been having increased urgency and some burning when she urinates for the past two weeks. No fevers. No nausea. No vomiting.       The following portions of the patient's history were reviewed and updated as appropriate: Allergies, Medications, Problem List    Review of Systems:    Review is negative except for what is mentioned above.     Social Hx:    History   Smoking Status      Never Smoker   Smokeless Tobacco     Never Used         Objective:     Vitals:    11/29/17 1030   BP: 118/58   Pulse: 72   Temp: 97.8  F (36.6  C)   Weight: 108 lb (49 kg)       Exam:    General: No apparent distress. Calm. Alert and Oriented X3. Pt behavior is appropriate.  Skin: Warm, dry. Normal hair pattern. Free of lesions. Normal skin turgor.   Feet: Monofilament test normal.       Patient Active Problem List   Diagnosis     Type 2 diabetes mellitus     Mixed hyperlipidemia     Hypertension     Benign Adenomatous Polyp Of The Large Intestine     Allergic Rhinitis     Menopause     Adenocarcinoma Of The Breast     S/P mastectomy     Hyponatremia     Anorexia     Current Outpatient Prescriptions   Medication Sig Dispense Refill     amLODIPine (NORVASC) 5 MG tablet TAKE 1 TABLET BY MOUTH DAILY 90 tablet 1     anastrozole (ARIMIDEX) 1 mg tablet Take 1 mg by mouth daily.       aspirin 81 MG EC tablet Take 81 mg by mouth daily.       atenolol (TENORMIN) 50 MG tablet Take by mouth.       atorvastatin (LIPITOR) 20 MG tablet TAKE 1 TABLET BY MOUTH DAILY 90 tablet 1     B-complex with vitamin C (VITAMIN B COMPLEX WITH C) cap Take 1 capsule by mouth daily.       blood sugar diagnostic (GLUCOSE BLOOD) Strp Accu-Chek Compact Test Drum In Vitro Strip. Use 2 or 3 times daily as directed.       blood sugar diagnostic, drum (ACCU-CHEK COMPACT TEST) Strp USE 2 OR 3 TIMES DAILY AS DIRECTED 102 strip 4     cholecalciferol, vitamin D3, 1,000 unit tablet Take 1,000 Units by mouth daily.       KRILL OIL ORAL Take 1 capsule by mouth daily. 1000 mg oral capsule.        LANCETS MISC Use As Directed. 2 or 3 times daily.       lisinopril (PRINIVIL,ZESTRIL) 40 MG tablet TAKE 1 TABLET BY MOUTH ONCE DAILY 90 tablet 1     lutein 10 mg Tab Take 10 mg by mouth daily.       metFORMIN (GLUCOPHAGE) 500 MG tablet TAKE 2 TABLETS BY MOUTH 2 TIMES A DAY WITH MEALS. 360 tablet 0     metoprolol succinate (TOPROL XL) 100 MG 24 hr tablet Take 1 tablet  (100 mg total) by mouth daily. 90 tablet 3     MULTIVITAMIN ORAL Take 1 tablet by mouth daily. Tablet.       ondansetron (ZOFRAN-ODT) 4 MG disintegrating tablet Take 4 mg by mouth.       tolterodine (DETROL LA) 2 MG ER capsule TAKE 1 CAPSULE (2 MG TOTAL) BY MOUTH DAILY. 30 capsule 3     UBIDECARENONE (CO Q-10 ORAL) Take 1 capsule by mouth daily. CAPS       No current facility-administered medications for this visit.        Total time spent with patient was 15 minutes with >50% of time spent in face-to-face counseling regarding the above plan       Dakota Castro (Rob), LJ    11/29/2017

## 2021-06-16 PROBLEM — M81.0 OSTEOPOROSIS WITHOUT CURRENT PATHOLOGICAL FRACTURE, UNSPECIFIED OSTEOPOROSIS TYPE: Status: ACTIVE | Noted: 2020-02-27

## 2021-06-16 NOTE — TELEPHONE ENCOUNTER
Refill Approved    Rx renewed per Medication Renewal Policy. Medication was last renewed on 2/27/20.    Robbi Owens, [facility] Triage/Med Refill 4/13/2021     Requested Prescriptions   Pending Prescriptions Disp Refills     amLODIPine (NORVASC) 5 MG tablet [Pharmacy Med Name: AMLODIPINE BESYLATE 5 MG TAB] 90 tablet 3     Sig: TAKE 1 TABLET BY MOUTH EVERY DAY       Calcium-Channel Blockers Protocol Passed - 4/12/2021 12:07 AM        Passed - PCP or prescribing provider visit in past 12 months or next 3 months     Last office visit with prescriber/PCP: 12/29/2020 Didi Dunbar MD OR same dept: 12/29/2020 Didi Dunbar MD OR same specialty: 12/29/2020 Didi Dunbar MD  Last physical: 2/27/2020 Last MTM visit: Visit date not found   Next visit within 3 mo: Visit date not found  Next physical within 3 mo: Visit date not found  Prescriber OR PCP: Didi Dunbar MD  Last diagnosis associated with med order: 1. Hypertension  - amLODIPine (NORVASC) 5 MG tablet [Pharmacy Med Name: AMLODIPINE BESYLATE 5 MG TAB]; TAKE 1 TABLET BY MOUTH EVERY DAY  Dispense: 90 tablet; Refill: 3  - metoprolol succinate (TOPROL-XL) 100 MG 24 hr tablet [Pharmacy Med Name: METOPROLOL SUCC  MG TAB]; TAKE 1 TABLET BY MOUTH EVERY DAY  Dispense: 90 tablet; Refill: 3    If protocol passes may refill for 12 months if within 3 months of last provider visit (or a total of 15 months).             Passed - Blood pressure filed in past 12 months     BP Readings from Last 1 Encounters:   12/29/20 134/74                metoprolol succinate (TOPROL-XL) 100 MG 24 hr tablet [Pharmacy Med Name: METOPROLOL SUCC  MG TAB] 90 tablet 3     Sig: TAKE 1 TABLET BY MOUTH EVERY DAY       Beta-Blockers Refill Protocol Passed - 4/12/2021 12:07 AM        Passed - PCP or prescribing provider visit in past 12 months or next 3 months     Last office visit with prescriber/PCP: 12/29/2020 Didi Dunbar MD OR same dept: 12/29/2020 Bettina  iDdi GARCIA MD OR same specialty: 12/29/2020 Didi Dunbar MD  Last physical: 2/27/2020 Last MTM visit: Visit date not found   Next visit within 3 mo: Visit date not found  Next physical within 3 mo: Visit date not found  Prescriber OR PCP: Didi Dunbar MD  Last diagnosis associated with med order: 1. Hypertension  - amLODIPine (NORVASC) 5 MG tablet [Pharmacy Med Name: AMLODIPINE BESYLATE 5 MG TAB]; TAKE 1 TABLET BY MOUTH EVERY DAY  Dispense: 90 tablet; Refill: 3  - metoprolol succinate (TOPROL-XL) 100 MG 24 hr tablet [Pharmacy Med Name: METOPROLOL SUCC  MG TAB]; TAKE 1 TABLET BY MOUTH EVERY DAY  Dispense: 90 tablet; Refill: 3    If protocol passes may refill for 12 months if within 3 months of last provider visit (or a total of 15 months).             Passed - Blood pressure filed in past 12 months     BP Readings from Last 1 Encounters:   12/29/20 134/74

## 2021-06-16 NOTE — TELEPHONE ENCOUNTER
Refill Request  Medication name: metFORMIN (GLUCOPHAGE) 500 MG tablet  Requested Prescriptions      No prescriptions requested or ordered in this encounter     Who prescribed the medication: Dakota Castro  Last refill on medication: 2/27/2020  Requested Pharmacy: [facility]  Last appointment with PCP: 12/29/20  Next appointment: unknown    Nimo Patterson RT (R)

## 2021-06-16 NOTE — TELEPHONE ENCOUNTER
Refill Approved    Rx renewed per Medication Renewal Policy. Medication was last renewed on 2/27/20.    Robbi Owens, [facility] Triage/Med Refill 4/14/2021     Requested Prescriptions   Pending Prescriptions Disp Refills     lisinopriL (PRINIVIL,ZESTRIL) 40 MG tablet [Pharmacy Med Name: LISINOPRIL 40 MG TABLET] 90 tablet 3     Sig: TAKE 1 TABLET BY MOUTH EVERY DAY       Ace Inhibitors Refill Protocol Passed - 4/13/2021 12:05 AM        Passed - PCP or prescribing provider visit in past 12 months       Last office visit with prescriber/PCP: 12/29/2020 Didi Dunbar MD OR same dept: 12/29/2020 Didi Dunbar MD OR same specialty: 12/29/2020 Didi Dunbar MD  Last physical: 2/27/2020 Last MTM visit: Visit date not found   Next visit within 3 mo: Visit date not found  Next physical within 3 mo: Visit date not found  Prescriber OR PCP: Didi Dunbar MD  Last diagnosis associated with med order: 1. Hypertension  - lisinopriL (PRINIVIL,ZESTRIL) 40 MG tablet [Pharmacy Med Name: LISINOPRIL 40 MG TABLET]; TAKE 1 TABLET BY MOUTH EVERY DAY  Dispense: 90 tablet; Refill: 3    If protocol passes may refill for 12 months if within 3 months of last provider visit (or a total of 15 months).             Passed - Serum Potassium in past 12 months     Lab Results   Component Value Date    Potassium 4.5 12/15/2020             Passed - Blood pressure filed in past 12 months     BP Readings from Last 1 Encounters:   12/29/20 134/74             Passed - Serum Creatinine in past 12 months     Creatinine   Date Value Ref Range Status   12/15/2020 0.99 0.60 - 1.10 mg/dL Final

## 2021-06-17 NOTE — PATIENT INSTRUCTIONS - HE
Patient Instructions by Romaine Gordillo PT at 9/5/2019 12:00 PM     Author: Romaine Gordillo PT Service: -- Author Type: Physical Therapist    Filed: 9/5/2019 12:22 PM Encounter Date: 9/5/2019 Status: Signed    : Romaine Gordillo PT (Physical Therapist)          PARTIAL HEEL-TOE STANCE    Stand at your counter top with your right foot partially in front of the other.  Look straight ahead. Let your hands hover over the counter top. Hold for 1 minute, then repeat with left foot in front. Perform 1x/day

## 2021-06-17 NOTE — PATIENT INSTRUCTIONS - HE
Patient Instructions by Romaine Gordillo PT at 8/2/2019 10:00 AM     Author: Romaine Gordillo PT Service: -- Author Type: Physical Therapist    Filed: 8/2/2019 10:47 AM Encounter Date: 8/2/2019 Status: Signed    : Romaine Gordillo PT (Physical Therapist)        STANDING NBOS WITH EYES OPEN    Stand with your feet together.  Look straight ahead. Hold for 1 minute. Do on each side. Perform 1-2x/day        Toe/Heel Raises    Gently rise up on toes and roll back on heels.    Perform       15-20 Reps     1-2 Times per day      Marching    Holding onto a heavy chair or counter, alternately lift knees, taking high steps.    Perform       15-20 Reps     1-2 Times per day        Side Leg Kicks    Kick leg out to the side and slowly bring back to center.    Perform       15-20 Reps     1-2 Times per day      Back Leg Kicks    Kick leg back as far as possible standing tall with your back upright.    Perform       15-20 Reps     1-2 Times per day

## 2021-06-17 NOTE — PATIENT INSTRUCTIONS - HE
Patient Instructions by Dakota Castro CNP at 2/12/2019 11:30 AM     Author: Dakota Castro CNP Service: -- Author Type: Nurse Practitioner    Filed: 2/12/2019 11:42 AM Encounter Date: 2/12/2019 Status: Addendum    : Dakota Castro CNP (Nurse Practitioner)    Related Notes: Original Note by Dakota Castro CNP (Nurse Practitioner) filed at 2/12/2019 11:40 AM       We will recheck labs today.  I will send out a results letter in the mail.  If there is something urgent, we will call.    No changes in medications for now.  A refill of your metformin was sent into the pharmacy.    Call and schedule appointment with the dermatologist for a skin check.    Follow-up with me in 6 months.  Patient Education     Your Health Risk Assessment indicates you feel you are not in good physical health.    A healthy lifestyle helps keep the body fit and the mind alert. It helps protect you from disease, helps you fight disease, and helps prevent chronic disease (disease that doesn't go away) from getting worse. This is important as you get older and begin to notice twinges in muscles and joints and a decline in the strength and stamina you once took for granted. A healthy lifestyle includes good healthcare, good nutrition, weight control, recreation, and regular exercise. Avoid harmful substances and do what you can to keep safe. Another part of a healthy lifestyle is stay mentally active and socially involved.    Good healthcare     Have a wellness visit every year.     If you have new symptoms, let us know right away. Don't wait until the next checkup.     Take medicines exactly as prescribed and keep your medicines in a safe place. Tell us if your medicine causes problems.   Healthy diet and weight control     Eat 3 or 4 small, nutritious, low-fat, high-fiber meals a day. Include a variety of fruits, vegetables, and whole-grain foods.     Make sure you get enough calcium in your diet. Calcium, vitamin D, and exercise  help prevent osteoporosis (bone thinning).     If you live alone, try eating with others when you can. That way you get a good meal and have company while you eat it.     Try to keep a healthy weight. If you eat more calories than your body uses for energy, it will be stored as fat and you will gain weight.     Recreation   Recreation is not limited to sports and team events. It includes any activity that provides relaxation, interest, enjoyment, and exercise. Recreation provides an outlet for physical, mental, and social energy. It can give a sense of worth and achievement. It can help you stay healthy.       Patient Education     Exercise for a Healthier Heart  You may wonder how you can improve the health of your heart. If youre thinking about exercise, youre on the right track. You dont need to become an athlete, but you do need a certain amount of brisk exercise to help strengthen your heart. If you have been diagnosed with a heart condition, your doctor may recommend exercise to help stabilize your condition. To help make exercise a habit, choose safe, fun activities.       Be sure to check with your health care provider before starting an exercise program.    Why exercise?  Exercising regularly offers many healthy rewards. It can help you do all of the following:    Improve your blood cholesterol levels to help prevent further heart trouble    Lower your blood pressure to help prevent a stroke or heart attack    Control diabetes, or reduce your risk of getting this disease    Improve your heart and lung function    Reach and maintain a healthy weight    Make your muscles stronger and more limber so you can stay active    Prevent falls and fractures by slowing the loss of bone mass (osteoporosis)    Manage stress better  Exercise tips  Ease into your routine. Set small goals. Then build on them.  Exercise on most days. Aim for a total of 150 or more minutes of moderate to  vigorous intensity activity each  week. Consider 40 minutes, 3 to 4 times a week. For best results, activity should last for 40 minutes on average. It is OK to work up to the 40 minute period over time. Examples of moderate-intensity activity is walking one mile in 15 minutes or 30 to 45 minutes of yard work.  Step up your daily activity level. Along with your exercise program, try being more active throughout the day. Walk instead of drive. Do more household tasks or yard work.  Choose one or more activities you enjoy. Walking is one of the easiest things you can do. You can also try swimming, riding a bike, or taking an exercise class.  Stop exercising and call your doctor if you:    Have chest pain or feel dizzy or lightheaded    Feel burning, tightness, pressure, or heaviness in your chest, neck, shoulders, back, or arms    Have unusual shortness of breath    Have increased joint or muscle pain    Have palpitations or an irregular heartbeat      9555-1020 The "[employer] [address] [place] [address]. All rights reserved. This information is not intended as a substitute for professional medical care. Always follow your healthcare professional's instructions.         Patient Education   Understanding USDA MyPlate  The USDA (US Department of Agriculture) has guidelines to help you make healthy food choices. These are called MyPlate. MyPlate shows the food groups that make up healthy meals using the image of a place setting. Before you eat, think about the healthiest choices for what to put onto your plate or into your cup or bowl. To learn more about building a healthy plate, visit www.choosemyplate.gov.       The Food Groups    Fruits: Any fruit or 100% fruit juice counts as part of the Fruit Group. Fruits may be fresh, canned, frozen, or dried, and may be whole, cut-up, or pureed. Make half your plate fruits and vegetables.    Vegetables: Any vegetable or 100% vegetable juice counts as a member of the Vegetable Group.  Vegetables may be fresh, frozen, canned, or dried. They can be served raw or cooked and may be whole, cut-up, or mashed. Make half your plate fruits and vegetables.     Grains: All foods made from grains are part of the Grains Group. These include wheat, rice, oats, cornmeal, and barley such as bread, pasta, oatmeal, cereal, tortillas, and grits. Grains should be no more than a quarter of your plate. At least half of your grains should be whole grains.    Protein: This group includes meat, poultry, seafood, beans and peas, eggs, processed soy products (like tofu), nuts (including nut butters), and seeds. Make protein choices no more than a quarter of your plate. Meat and poultry choices should be lean or low fat.    Dairy: All fluid milk products and foods made from milk that contain calcium, like yogurt and cheese are part of the Dairy Group. (Foods that have little calcium, such as cream, butter, and cream cheese, are not part of the group.) Most dairy choices should be low-fat or fat-free.    Oils: These are fats that are liquid at room temperature. They include canola, corn, olive, soybean, and sunflower oil. Foods that are mainly oil include mayonnaise, certain salad dressings, and soft margarines. You should have only 5 to 7 teaspoons of oils a day. You probably already get this much from the food you eat.  Use [employer] to Help Build Your Meals  The SuperTracker can help you plan and track your meals and activity. You can look up individual foods to see or compare their nutritional value. You can get guidelines for what and how much you should eat. You can compare your food choices. And you can assess personal physical activities and see ways you can improve. Go to www.choosemyplate.gov/supertracker/.    1401-1091 The [employer]. [address] [place] [address], [place] [address]. All rights reserved. This information is not intended as a substitute for professional medical care. Always follow your  healthcare professional's instructions.           Patient Education   Instrumental Activities of Daily Living  Your Health Risk Assessment indicates you have difficulties with instrumental activities of daily living which include laundry, housekeeping, banking, shopping, using the telephone, food preparation, transportation, or taking your own medications. Please make a follow up appointment for us to address this issue in more detail.    [employer] has resources available on the following website: [email or web address]     Also, here is a local agency that provides help with meals and other assistance:   [facility] Line: [phone]     Patient Education   Signs of Hearing Loss  Hearing loss is a problem shared by many people. In fact, it is one of the most common health conditions, particularly as people age. Most people over age 65 have some hearing loss, and by age 80, almost everyone does. Because hearing loss usually occurs slowly over the years, you may not realize your hearing ability has gotten worse.       Have your hearing checked  Contact your [facility] care provider if you:    Have to strain to hear normal conversation.    Have to watch other peoples faces very carefully to follow what theyre saying.    Need to ask people to repeat what theyve said.    Often misunderstand what people are saying.    Turn the volume of the television or radio up so high that others complain.    Feel that people are mumbling when theyre talking to you.    Find that the effort to hear leaves you feeling tired and irritated.    Notice, when using the phone, that you hear better with 1 ear than the other.    3363-9722 The [employer]. [address] [place] [address], [place] [address]. All rights reserved. This information is not intended as a substitute for professional medical care. Always follow your healthcare professional's instructions.         Patient Education   Urinary Incontinence, Female  (Adult)  Urinary incontinence means loss of control of the bladder. This problem affects many women, especially as they get older. If you have incontinence, you may be embarrassed to ask for help. But know that this problem can be treated.  Types of Incontinence  There are different types of incontinence. Two of the main types are described here. You can have more than one type.    Stress incontinence. With this type, urine leaks when pressure (stress) is put on the bladder. This may happen when you cough, sneeze, or laugh. Stress incontinence most often occurs because the pelvic floor muscles that support the bladder and urethra are weak. This can happen after pregnancy and vaginal childbirth or a hysterectomy. It can also be due to excess body weight or hormone changes.    Urge incontinence (also called overactive bladder). With this type, a sudden urge to urinate is felt often. This may happen even though there may not be much urine in the bladder. The need to urinate often during the night is common. Urge incontinence most often occurs because of bladder spasms. This may be due to bladder irritation or infection. Damage to bladder nerves or pelvic muscles, constipation, and certain medicines can also lead to urge incontinence.  Treatment of urinary incontinence depends on the cause. Further evaluation is needed to find the type you have. This will likely include an exam and certain tests. Based on the results, you and your healthcare provider can then plan treatment. Until a diagnosis is made, the home care tips below can help relieve symptoms.  Home care    Do pelvic floor muscle exercises, if they are prescribed. The pelvic floor muscles help support the bladder and urethra. Many women find that their symptoms improve when doing special exercises that strengthen these muscles. To do the exercises contract the muscles you would use to stop your stream of urine, but do this when youre not urinating. Hold for 10  seconds, then relax. Repeat 10 to 20 times in a row, at least 3 times a day. Your provider may give you other instructions for how to do the exercises and how often.    Keep a bladder diary. This helps track how often and how much you urinate over a set period of time. Bring this diary with you to your next visit with the provider. The information can help your provider learn more about your bladder problem.    Lose weight, if advised to by your provider. Excess weight puts pressure on the bladder. Your provider can help you create a weight-loss plan thats right for you. This may include exercising more and making certain diet changes.    Don't consume foods and drinks that may irritate the bladder. These can include alcohol and caffeinated drinks.    Quit smoking. Smoking and other tobacco use can lead to chronic cough that strains the pelvic floor muscles. Smoking may also damage the bladder and urethra. Talk with your provider about treatments or methods you can use to quit smoking.    If drinking large amounts of fluid causes you to have symptoms, you may be advised to limit your fluid intake. You may also be advised to drink most of your fluids during the day and to limit fluids at night.    If youre worried about urine leakage or accidents, you may wear absorbent pads to catch urine. Change the pads often. This helps reduce discomfort. It may also reduce the risk of skin or bladder infections.  Follow-up care  Follow up with your healthcare provider, or as directed. It may take some to find the right treatment for your problem. Your treatment plan may include special therapies or medicines. Certain procedures or surgery may also be options. Be sure to discuss any questions you have with your provider.  When to seek medical advice  Call the healthcare provider right away if any of these occur:    Fever of 100.4 F (38 C) or higher, or as directed by your provider    Bladder pain or fullness    Abdominal  swelling    Nausea or vomiting    Back pain    Weakness, dizziness or fainting  Date Last Reviewed: 10/1/2017    6485-3960 The [employer]. [address] [place] [address]. All rights reserved. This information is not intended as a substitute for professional medical care. Always follow your healthcare professional's instructions.     Patient Education   Preventing Falls in the Home  As you get older, falls are more likely. Thats because your reaction time slows. Your muscles and joints may also get stiffer, making them less flexible. Illness, medications, and vision changes can also affect your balance. A fall could leave you unable to live on your own. To make your home safer, follow these tips:    Floors    Put nonskid pads under area rugs.    Remove throw rugs.    Replace worn floor coverings.    Tack carpets firmly to each step on carpeted stairs. Put nonskid strips on the edges of uncarpeted stairs.    Keep floors and stairs free of clutter and cords.    Arrange furniture so there are clear pathways.    Clean up any spills right away.    Bathrooms    Install grab bars in the tub or shower.    Apply nonskid strips or put a nonskid rubber mat in the tub or shower.    Sit on a bath chair to bathe.    Use bathmats with nonskid backing.    Lighting    Keep a flashlight in each room.    Put a nightlight along the pathway between the bedroom and the bathroom.    4162-7135 [employer]. [address] [place] [address]. All rights reserved. This information is not intended as a substitute for professional medical care. Always follow your healthcare professional's instructions.           Advance Directive  Patients advance directive was discussed and I am comfortable with the patients wishes.  Patient Education   Personalized Prevention Plan  You are due for the preventive services outlined below.  Your care team is available to assist you in scheduling these services.  If you  have already completed any of these items, please share that information with your care team to update in your medical record.  Health Maintenance   Topic Date Due   ? DIABETES OPHTHALMOLOGY EXAM  11/11/1945   ? ZOSTER VACCINES (1 of 2) 11/11/1987   ? DXA SCAN  05/07/2017   ? MAMMOGRAM  10/20/2017   ? INFLUENZA VACCINE RULE BASED (1) 08/01/2018   ? DIABETES HEMOGLOBIN A1C  08/12/2019   ? DIABETES FOLLOW-UP  08/12/2019   ? DIABETES FOOT EXAM  02/12/2020   ? DIABETES URINE MICROALBUMIN  02/12/2020   ? FALL RISK ASSESSMENT  02/12/2020   ? ADVANCE DIRECTIVES DISCUSSED WITH PATIENT  10/22/2020   ? TD 18+ HE  01/10/2023   ? PNEUMOCOCCAL POLYSACCHARIDE VACCINE AGE 65 AND OVER  Completed   ? PNEUMOCOCCAL CONJUGATE VACCINE FOR ADULTS (PCV13 OR PREVNAR)  Completed

## 2021-06-18 NOTE — LETTER
Letter by Dakota Castro CNP at      Author: Dakota Castro CNP Service: -- Author Type: --    Filed:  Encounter Date: 2/13/2019 Status: (Other)       Nalini Sepulveda  [address] [facility] [id number]             February 13, 2019         Dear MsIsa Sepulveda,    Below are the results from your recent visit:    Resulted Orders   HM2(CBC w/o Differential)   Result Value Ref Range    WBC 11.6 (H) 4.0 - 11.0 thou/uL    RBC 3.55 (L) 3.80 - 5.40 mill/uL    Hemoglobin 11.4 (L) 12.0 - 16.0 g/dL    Hematocrit 34.3 (L) 35.0 - 47.0 %    MCV 97 80 - 100 fL    MCH 32.2 27.0 - 34.0 pg    MCHC 33.4 32.0 - 36.0 g/dL    RDW 11.5 11.0 - 14.5 %    Platelets 298 140 - 440 thou/uL    MPV 9.0 7.0 - 10.0 fL   Comprehensive Metabolic Panel   Result Value Ref Range    Sodium 137 136 - 145 mmol/L    Potassium 4.3 3.5 - 5.0 mmol/L    Chloride 98 98 - 107 mmol/L    CO2 23 22 - 31 mmol/L    Anion Gap, Calculation 16 5 - 18 mmol/L    Glucose 224 (H) 70 - 125 mg/dL    BUN 17 8 - 28 mg/dL    Creatinine 1.04 0.60 - 1.10 mg/dL    GFR MDRD Af Amer >60 >60 mL/min/1.73m2    GFR MDRD Non Af Amer 51 (L) >60 mL/min/1.73m2    Bilirubin, Total 0.7 0.0 - 1.0 mg/dL    Calcium 9.6 8.5 - 10.5 mg/dL    Protein, Total 7.8 6.0 - 8.0 g/dL    Albumin 4.3 3.5 - 5.0 g/dL    Alkaline Phosphatase 111 45 - 120 U/L    AST 23 0 - 40 U/L    ALT 20 0 - 45 U/L    Narrative    Fasting Glucose reference range is 70-99 mg/dL per  American Diabetes Association (ADA) guidelines.   Microalbumin, Random Urine   Result Value Ref Range    Microalbumin, Random Urine 13.27 (H) 0.00 - 1.99 mg/dL    Creatinine, Urine 78.6 mg/dL    Microalbumin/Creatinine Ratio Random Urine 168.8 (H) <=19.9 mg/g    Narrative    Microalbumin, Random Urine  <2.0 mg/dL . . . . . . . . Normal  3.0-30.0 mg/dL . . . . . . Microalbuminuria  >30.0 mg/dL . . . . . .  . Clinical Proteinuria    Microalbumin/Creatinine Ratio, Random Urine  <20 mg/g . . . . .. . . . Normal   mg/g . . . . . . .  Microalbuminuria  >300 mg/g . . . . . . . . Clinical Proteinuria     Vitamin D, Total (25-Hydroxy)   Result Value Ref Range    Vitamin D, Total (25-Hydroxy) 32.6 30.0 - 80.0 ng/mL    Narrative    Deficiency <10.0 ng/mL  Insufficiency 10.0-29.9 ng/mL  Sufficiency 30.0-80.0 ng/mL  Toxicity (possible) >100.0 ng/mL   Glycosylated Hemoglobin A1c   Result Value Ref Range    Hemoglobin A1c 8.8 (H) 3.5 - 6.0 %   LDL Cholesterol, Direct   Result Value Ref Range    Direct LDL 55 <=129 mg/dl       Your hemoglobin A1c has worsened quite considerably since we last checked.  We want this number to be less than 8.  You were measured at 8.8.  Try to work on eating less simple carbohydrates and sweets.  No juice or soda.  Schedule an appointment to come back and see me when you return from your trip so we can review your diabetes medications as well as your blood sugar measurements.    Please call with questions or contact us using [email or web address].    Sincerely,        Electronically signed by Dakota Castro CNP

## 2021-06-18 NOTE — PATIENT INSTRUCTIONS - HE
Patient Instructions by Didi Dunbar MD at 2/27/2020 11:00 AM     Author: Didi Dunbar MD Service: -- Author Type: Physician    Filed: 2/27/2020 11:28 AM Encounter Date: 2/27/2020 Status: Signed    : Didi Dunbar MD (Physician)         Patient Education     Exercise for a Healthier Heart  You may wonder how you can improve the health of your heart. If youre thinking about exercise, youre on the right track. You dont need to become an athlete, but you do need a certain amount of brisk exercise to help strengthen your heart. If you have been diagnosed with a heart condition, your doctor may recommend exercise to help stabilize your condition. To help make exercise a habit, choose safe, fun activities.       Be sure to check with your health care provider before starting an exercise program.    Why exercise?  Exercising regularly offers many healthy rewards. It can help you do all of the following:    Improve your blood cholesterol levels to help prevent further heart trouble    Lower your blood pressure to help prevent a stroke or heart attack    Control diabetes, or reduce your risk of getting this disease    Improve your heart and lung function    Reach and maintain a healthy weight    Make your muscles stronger and more limber so you can stay active    Prevent falls and fractures by slowing the loss of bone mass (osteoporosis)    Manage stress better  Exercise tips  Ease into your routine. Set small goals. Then build on them.  Exercise on most days. Aim for a total of 150 or more minutes of moderate to  vigorous intensity activity each week. Consider 40 minutes, 3 to 4 times a week. For best results, activity should last for 40 minutes on average. It is OK to work up to the 40 minute period over time. Examples of moderate-intensity activity is walking one mile in 15 minutes or 30 to 45 minutes of yard work.  Step up your daily activity level. Along with your exercise program, try being more  active throughout the day. Walk instead of drive. Do more household tasks or yard work.  Choose one or more activities you enjoy. Walking is one of the easiest things you can do. You can also try swimming, riding a bike, or taking an exercise class.  Stop exercising and call your doctor if you:    Have chest pain or feel dizzy or lightheaded    Feel burning, tightness, pressure, or heaviness in your chest, neck, shoulders, back, or arms    Have unusual shortness of breath    Have increased joint or muscle pain    Have palpitations or an irregular heartbeat      7758-1325 [employer]. [address] [place] [address]. All rights reserved. This information is not intended as a substitute for professional medical care. Always follow your healthcare professional's instructions.         Patient Education   Understanding [employer] MyPlate  The USDA (US Department of Agriculture) has guidelines to help you make healthy food choices. These are called MyPlate. MyPlate shows the food groups that make up healthy meals using the image of a place setting. Before you eat, think about the healthiest choices for what to put onto your plate or into your cup or bowl. To learn more about building a healthy plate, visit www.choosemyplate.gov.       The Food Groups    Fruits: Any fruit or 100% fruit juice counts as part of the Fruit Group. Fruits may be fresh, canned, frozen, or dried, and may be whole, cut-up, or pureed. Make half your plate fruits and vegetables.    Vegetables: Any vegetable or 100% vegetable juice counts as a member of the Vegetable Group. Vegetables may be fresh, frozen, canned, or dried. They can be served raw or cooked and may be whole, cut-up, or mashed. Make half your plate fruits and vegetables.     Grains: All foods made from grains are part of the Grains Group. These include wheat, rice, oats, cornmeal, and barley such as bread, pasta, oatmeal, cereal, tortillas, and grits. Grains should be  no more than a quarter of your plate. At least half of your grains should be whole grains.    Protein: This group includes meat, poultry, seafood, beans and peas, eggs, processed soy products (like tofu), nuts (including nut butters), and seeds. Make protein choices no more than a quarter of your plate. Meat and poultry choices should be lean or low fat.    Dairy: All fluid milk products and foods made from milk that contain calcium, like yogurt and cheese are part of the Dairy Group. (Foods that have little calcium, such as cream, butter, and cream cheese, are not part of the group.) Most dairy choices should be low-fat or fat-free.    Oils: These are fats that are liquid at room temperature. They include canola, corn, olive, soybean, and sunflower oil. Foods that are mainly oil include mayonnaise, certain salad dressings, and soft margarines. You should have only 5 to 7 teaspoons of oils a day. You probably already get this much from the food you eat.  Use FitWithMe to Help Build Your Meals  The [employer] can help you plan and track your meals and activity. You can look up individual foods to see or compare their nutritional value. You can get guidelines for what and how much you should eat. You can compare your food choices. And you can assess personal physical activities and see ways you can improve. Go to www.Revert.gov/supertracker/.    8695-5643 [employer]. [address] [place] [address]. All rights reserved. This information is not intended as a substitute for professional medical care. Always follow your healthcare professional's instructions.           Patient Education   Instrumental Activities of Daily Living  Your Health Risk Assessment indicates you have difficulties with instrumental activities of daily living which include laundry, housekeeping, banking, shopping, using the telephone, food preparation, transportation, or taking your own medications. Please make  a follow up appointment for us to address this issue in more detail.    [employer] has resources available on the following website: https://www.healthUCAN.org/caregivers.[email or web address]     Also, here is a local agency that provides help with meals and other assistance:   [facility] Line: [phone]     Patient Education   Signs of Hearing Loss  Hearing loss is a problem shared by many people. In fact, it is one of the most common health conditions, particularly as people age. Most people over age 65 have some hearing loss, and by age 80, almost everyone does. Because hearing loss usually occurs slowly over the years, you may not realize your hearing ability has gotten worse.       Have your hearing checked  Contact your [facility] care provider if you:    Have to strain to hear normal conversation.    Have to watch other peoples faces very carefully to follow what theyre saying.    Need to ask people to repeat what theyve said.    Often misunderstand what people are saying.    Turn the volume of the television or radio up so high that others complain.    Feel that people are mumbling when theyre talking to you.    Find that the effort to hear leaves you feeling tired and irritated.    Notice, when using the phone, that you hear better with 1 ear than the other.    4129-6409 [employer]. [address] [place] [address], [place] [address]. All rights reserved. This information is not intended as a substitute for professional medical care. Always follow your healthcare professional's instructions.         Patient Education   Understanding Advance Care Planning  Advance care planning is the process of deciding ones own future medical care. It helps ensure that if you cant speak for yourself, your wishes can still be carried out. The plan is a series of legal documents that note a persons wishes. The documents vary by state. Advance care planning may be done when a person has a serious illness that is expected to get worse.  It may be done before major surgery. And it can help you and your family be prepared in case of a major illness or injury. Advance care planning helps with making decisions at these times.       A health care proxy is a person who acts as the voice of a patient when the patient cant speak for himself or herself. The name of this role varies by state. It may be called a Durable Medical Power of  or Durable Power of  for Healthcare. It may be called an agent, surrogate, or advocate. Or it may be called a representative or decision maker. It is an official duty that is identified by a legal document. The document also varies by state.    Why Is Advance Care Planning Important?  If a person communicates their healthcare wishes:    They will be given medical care that matches their values and goals.    Their family members will not be forced to make decisions in a crisis with no guidance.  Creating a Plan  Making an advance care plan is often done in 3 steps:    Thinking about ones wishes. To create an advance care plan, you should think about what kind of medical treatment you would want if you lose the ability to communicate. Are there any situations in which you would refuse or stop treatment? Are there therapies you would want or not want? And whom do you want to make decisions for you? There are many places to learn more about how to plan for your care. Ask your doctor or  for resources.    Picking a health care proxy. This means choosing a trusted person to speak for you only when you cant speak for yourself. When you cannot make medical decisions, your proxy makes sure the instructions in your advance care plan are followed. A proxy does not make decisions based on his or her own opinions. They must put aside those opinions and values if needed, and carry out your wishes.    Filling out the legal documents. There are several kinds of legal documents for advance care planning. Each one  tells health care providers your wishes. The documents may vary by state. They must be signed and may need to be witnessed or notarized. You can cancel or change them whenever you wish. Depending on your state, the documents may include a Healthcare Proxy form, Living Will, Durable Medical Power of , Advance Directive, or others.  The Familys Role  The best help a family can give is to support their loved ones wishes. Open and honest communication is vital. Family should express any concerns they have about the patients choices while the patient can still make decisions.    4544-1672 [employer]. [address] [place] [address]. All rights reserved. This information is not intended as a substitute for professional medical care. Always follow your healthcare professional's instructions.         Also, [facility] offers a free, downloadable health care directive that allows you to share your treatment choices and personal preferences if you cannot communicate your wishes. It also allows you to appoint another person (called a health care agent) to make health care decisions if you are unable to do so. You can download an advance directive by going here: [email or web address]     Patient Education   Personalized Prevention Plan  You are due for the preventive services outlined below.  Your care team is available to assist you in scheduling these services.  If you have already completed any of these items, please share that information with your care team to update in your medical record.  Health Maintenance   Topic Date Due   ? DIABETIC EYE EXAM  1935   ? ZOSTER VACCINES (1 of 2) 11/11/1987   ? LIPID  08/17/2017   ? A1C  05/22/2020   ? BMP  11/22/2020   ? MICROALBUMIN  11/22/2020   ? MAMMOGRAM  01/02/2021   ? MEDICARE ANNUAL WELLNESS VISIT  02/27/2021   ? DIABETIC FOOT EXAM  02/27/2021   ? FALL RISK ASSESSMENT  02/27/2021   ? DXA SCAN   01/09/2022   ? TD 18+ HE  01/10/2023   ? ADVANCE CARE PLANNING  02/12/2024   ? PNEUMOCOCCAL IMMUNIZATION 65+ LOW/MEDIUM RISK  Completed   ? INFLUENZA VACCINE RULE BASED  Completed

## 2021-06-21 NOTE — PROGRESS NOTES
Impression:  Cystitis    Plan:  Macrobid for 5 days, drink a lot of liquids, return if new or worsening problems, follow-up with primary care      Chief Complaint:  Chief Complaint   Patient presents with     Dysuria     painful urination x2 days         HPI:   Nalini Sepulveda is a 82 y.o. female who presents to this clinic for the evaluation of dysuria.  Patient complains of a 2-day history of dysuria urinary frequency and urgency.  The urine has been clear she is not noted any hematuria.  No back pain or abdominal pain or fever.  No nausea or vomiting.  No other symptoms.  The dysuria is mild to moderate, similar to her previous urinary tract infections      PMH:   Past Medical History:   Diagnosis Date     Adenocarcinoma of breast (H)     left     Allergic rhinitis      Arthritis      Benign adenomatous polyp of large intestine      Breast cancer (H) 2006    Left side     Diabetes (H)      Hx of radiation therapy 2006     Hyperlipidemia      Hypertension      Menopause      S/P mastectomy 12/5/2015     Past Surgical History:   Procedure Laterality Date     BREAST BIOPSY Left 2006     BREAST LUMPECTOMY Left 2006     HYSTERECTOMY  1994     MASTECTOMY Left 12/2015     WI EXCIS BARTHOLIN GLAND/CYST      Description: Excision Of Bartholin's Gland Or Cyst;  Recorded: 07/29/2008;     WI INCISE VESTIBULAR NERVE,TRANSLABYR Left 12/4/2015    Procedure: LEFT LATISSIMUS DORSI FLAP BREAST RECONSTRUCTION WITH GEL IMPLANT;  Surgeon: Harley Abernathy MD;  Location: [facility];  Service:      WI MASTECTOMY, MODIFIED RADICAL Left 12/4/2015    Procedure: LEFT BREAST MASTECTOMY;  Surgeon: Jeferson Rodriguez MD;  Location: [facility];  Service: General     WI TOTAL ABDOM HYSTERECTOMY      Description: Total Abdominal Hysterectomy;  Recorded: 12/30/2009;  Comments: BSO         ROS:  All other systems negative    Meds:    Current Outpatient Prescriptions:      ACCU-CHEK COMPACT PLUS TEST Strp, USE 2 OR 3 TIMES  DAILY AS DIRECTED, Disp: 102 strip, Rfl: 1     amLODIPine (NORVASC) 5 MG tablet, TAKE 1 TABLET BY MOUTH DAILY, Disp: 90 tablet, Rfl: 3     anastrozole (ARIMIDEX) 1 mg tablet, Take 1 mg by mouth daily., Disp: , Rfl:      aspirin 81 MG EC tablet, Take 81 mg by mouth daily., Disp: , Rfl:      atenolol (TENORMIN) 50 MG tablet, Take by mouth., Disp: , Rfl:      atorvastatin (LIPITOR) 20 MG tablet, TAKE 1 TABLET BY MOUTH DAILY, Disp: 90 tablet, Rfl: 2     B-complex with vitamin C (VITAMIN B COMPLEX WITH C) cap, Take 1 capsule by mouth daily., Disp: , Rfl:      blood sugar diagnostic (GLUCOSE BLOOD) Strp, Accu-Chek Compact Test Drum In Vitro Strip. Use 2 or 3 times daily as directed., Disp: , Rfl:      cholecalciferol, vitamin D3, 1,000 unit tablet, Take 1,000 Units by mouth daily., Disp: , Rfl:      LANCETS MISC, Use As Directed. 2 or 3 times daily., Disp: , Rfl:      lisinopril (PRINIVIL,ZESTRIL) 40 MG tablet, TAKE 1 TABLET BY MOUTH ONCE DAILY, Disp: 90 tablet, Rfl: 3     lutein 10 mg Tab, Take 10 mg by mouth daily., Disp: , Rfl:      metFORMIN (GLUCOPHAGE) 500 MG tablet, TAKE 2 TABLETS BY MOUTH 2 TIMES A DAY WITH MEALS. NEED TO BE SEEN FOR FURTHER REFILLS., Disp: 120 tablet, Rfl: 0     metoprolol succinate (TOPROL-XL) 100 MG 24 hr tablet, TAKE 1 TABLET BY MOUTH EVERY DAY, Disp: 90 tablet, Rfl: 0     MULTIVITAMIN ORAL, Take 1 tablet by mouth daily. Tablet., Disp: , Rfl:      tolterodine (DETROL LA) 2 MG ER capsule, Take 1 capsule (2 mg total) by mouth daily., Disp: 90 capsule, Rfl: 2     UBIDECARENONE (CO Q-10 ORAL), Take 1 capsule by mouth daily. CAPS, Disp: , Rfl:      FLUZONE HIGH-DOSE 2018-19, PF, 180 mcg/0.5 mL Syrg injection, , Disp: , Rfl:      KRILL OIL ORAL, Take 1 capsule by mouth daily. 1000 mg oral capsule. , Disp: , Rfl:      ondansetron (ZOFRAN-ODT) 4 MG disintegrating tablet, Take 4 mg by mouth., Disp: , Rfl:         Social:  Social History     Social History     Marital status:      Spouse name:  N/A     Number of children: N/A     Years of education: N/A     Occupational History     Not on file.     Social History Main Topics     Smoking status: Never Smoker     Smokeless tobacco: Never Used     Alcohol use No     Drug use: No     Sexual activity: Not on file     Other Topics Concern     Not on file     Social History Narrative         Physical Exam:  Patient is sitting in a chair appears comfortable in no distress there is no CVA tenderness there is no abdominal tenderness or mass      Results:    Recent Results (from the past 24 hour(s))   Urinalysis-UC if Indicated   Result Value Ref Range    Color, UA Yellow Colorless, Yellow, Straw, Light Yellow    Clarity, UA Slightly Cloudy (!) Clear    Glucose, UA Negative Negative    Bilirubin, UA Negative Negative    Ketones, UA Negative Negative    Specific Gravity, UA 1.025 1.005 - 1.030    Blood, UA Small (!) Negative    pH, UA 7.0 5.0 - 8.0    Protein,  mg/dL (!) Negative mg/dL    Urobilinogen, UA 0.2 E.U./dL 0.2 E.U./dL, 1.0 E.U./dL    Nitrite, UA Negative Negative    Leukocytes, UA Large (!) Negative       No results found.      Williams Griffith MD

## 2021-06-22 NOTE — TELEPHONE ENCOUNTER
RN cannot approve Refill Request    RN can NOT refill this medication PCP messaged that patient is overdue for Office Visit.       Cherie Rey, [facility] Triage/Med Refill 1/4/2019    Requested Prescriptions   Pending Prescriptions Disp Refills     metoprolol succinate (TOPROL-XL) 100 MG 24 hr tablet [Pharmacy Med Name: METOPROLOL SUCC  MG TAB] 90 tablet 0     Sig: TAKE 1 TABLET BY MOUTH EVERY DAY    Beta-Blockers Refill Protocol Failed - 1/3/2019  1:26 AM       Failed - PCP or prescribing provider visit in past 12 months or next 3 months    Last office visit with prescriber/PCP: 7/11/2017 Aletha Silva MD OR same dept: Visit date not found OR same specialty: 11/29/2017 Dakota Castro CNP  Last physical: 8/17/2016 Last MTM visit: Visit date not found   Next visit within 3 mo: Visit date not found  Next physical within 3 mo: Visit date not found  Prescriber OR PCP: Aletha Silva MD  Last diagnosis associated with med order: 1. Hypertension  - metoprolol succinate (TOPROL-XL) 100 MG 24 hr tablet [Pharmacy Med Name: METOPROLOL SUCC  MG TAB]; TAKE 1 TABLET BY MOUTH EVERY DAY  Dispense: 90 tablet; Refill: 0    If protocol passes may refill for 12 months if within 3 months of last provider visit (or a total of 15 months).            Passed - Blood pressure filed in past 12 months    BP Readings from Last 1 Encounters:   11/05/18 124/70

## 2021-06-23 NOTE — TELEPHONE ENCOUNTER
RN cannot approve Refill Request    RN can NOT refill this medication overdue for office visits and/or labs.    Jeferson Mccollum, [facility] Triage/Med Refill 2/7/2019    Requested Prescriptions   Pending Prescriptions Disp Refills     metFORMIN (GLUCOPHAGE) 500 MG tablet 120 tablet 0     Sig: TAKE 2 TABLETS BY MOUTH 2 TIMES A DAY WITH MEALS. NEED TO BE SEEN FOR FURTHER REFILLS.    Metformin Refill Protocol Failed - 2/7/2019 12:43 PM       Failed - LFT or AST or ALT in last 12 months    Albumin   Date Value Ref Range Status   04/18/2017 3.2 (L) 3.5 - 5.0 g/dL Final     Bilirubin, Total   Date Value Ref Range Status   04/18/2017 0.4 0.0 - 1.0 mg/dL Final     Bilirubin, Direct   Date Value Ref Range Status   04/14/2017 0.2 <=0.5 mg/dL Final     Alkaline Phosphatase   Date Value Ref Range Status   04/18/2017 84 45 - 120 U/L Final     AST   Date Value Ref Range Status   04/18/2017 24 0 - 40 U/L Final     ALT   Date Value Ref Range Status   04/18/2017 28 0 - 45 U/L Final     Protein, Total   Date Value Ref Range Status   04/18/2017 6.2 6.0 - 8.0 g/dL Final               Failed - GFR or Serum Creatinine in last 6 months    GFR MDRD Non Af Amer   Date Value Ref Range Status   07/11/2017 >60 >60 mL/min/1.73m2 Final     GFR MDRD Af Amer   Date Value Ref Range Status   07/11/2017 >60 >60 mL/min/1.73m2 Final            Failed - A1C in last 6 months    Hemoglobin A1c   Date Value Ref Range Status   11/29/2017 7.5 (H) 3.5 - 6.0 % Final              Failed - Microalbumin in last year     Microalbumin, Random Urine   Date Value Ref Range Status   11/29/2017 7.41 (H) 0.00 - 1.99 mg/dL Final                 Passed - Blood pressure in last 12 months    BP Readings from Last 1 Encounters:   11/05/18 124/70            Passed - Visit with PCP or prescribing provider visit in last 6 months or next 3 months    Last office visit with prescriber/PCP: Visit date not found OR same dept: Visit date not found OR same specialty: 11/29/2017  Dakota Castro, CNP Last physical: Visit date not found Last MTM visit: Visit date not found         Next appt within 3 mo: Visit date not found  Next physical within 3 mo: Visit date not found  Prescriber OR PCP: lAetha Silva MD  Last diagnosis associated with med order: 1. Type 2 diabetes mellitus (H)  - metFORMIN (GLUCOPHAGE) 500 MG tablet; TAKE 2 TABLETS BY MOUTH 2 TIMES A DAY WITH MEALS. NEED TO BE SEEN FOR FURTHER REFILLS.  Dispense: 120 tablet; Refill: 0     If protocol passes may refill for 12 months if within 3 months of last provider visit (or a total of 15 months).

## 2021-06-24 NOTE — PROGRESS NOTES
Assessment and Plan:       1. Encounter for Medicare annual wellness exam      2. Visit for screening mammogram  - Mammo Screening Bilateral; Future    3. Type 2 diabetes mellitus (H)  She does not check her blood sugars very often.  May be once per month.  Last hemoglobin A1c was 7.5.  She has not had any hypoglycemic episodes.  Refills of her metformin were sent in today.  - Comprehensive Metabolic Panel  - Microalbumin, Random Urine  - Glycosylated Hemoglobin A1c  - metFORMIN (GLUCOPHAGE) 500 MG tablet; TAKE 2 TABLETS BY MOUTH 2 TIMES A DAY WITH MEALS. NEED TO BE SEEN FOR FURTHER REFILLS.  Dispense: 360 tablet; Refill: 3    4. Mixed hyperlipidemia  No reports of myalgias or arthralgias on atorvastatin 20 mg daily.  She is not fasting today.  - LDL Cholesterol, Direct    5. Hypertension  Stable on amlodipine, metoprolol, and lisinopril.  - Comprehensive Metabolic Panel    6. Benign Adenomatous Polyp Of The Large Intestine  Occasional diarrhea but no abdominal pain.  No blood in stool.    7. Anorexia  She is eating and drinking well.    8. Menopause      9. Health care maintenance  She does not want the shingles vaccine.  She had her influenza vaccine pharmacy this past fall.  - HM2(CBC w/o Differential)  - Vitamin D, Total (25-Hydroxy)            The patient's current medical problems were reviewed.    I have had an Advance Directives discussion with the patient.  The following health maintenance schedule was reviewed with the patient and provided in printed form in the after visit summary:   Health Maintenance   Topic Date Due     DIABETES OPHTHALMOLOGY EXAM  11/11/1945     ZOSTER VACCINES (1 of 2) 11/11/1987     DXA SCAN  05/07/2017     MAMMOGRAM  10/20/2017     INFLUENZA VACCINE RULE BASED (1) 08/01/2018     DIABETES HEMOGLOBIN A1C  08/12/2019     DIABETES FOLLOW-UP  08/12/2019     DIABETES FOOT EXAM  02/12/2020     DIABETES URINE MICROALBUMIN  02/12/2020     FALL RISK ASSESSMENT  02/12/2020     ADVANCE  DIRECTIVES DISCUSSED WITH PATIENT  10/22/2020     TD 18+ HE  01/10/2023     PNEUMOCOCCAL POLYSACCHARIDE VACCINE AGE 65 AND OVER  Completed     PNEUMOCOCCAL CONJUGATE VACCINE FOR ADULTS (PCV13 OR PREVNAR)  Completed        Subjective:   Chief Complaint: Nalini Sepulveda is an 83 y.o. female here for an Annual Wellness visit.     HPI: She sees a dentist every 6 months and is having implants done soon.  She was the eye doctor once per year.  She would like to go back to the dermatologist to have her seborrheic keratosis lesions reevaluated.  She does not exercise as much she should.  She is eating and drinking well.  No reports of depression or anxiety.  She feels safe at home.  She is going on a cruise soon and is excited to see some old childhood friends.     Review of Systems: Please see above.  The rest of the review of systems are negative for all systems.    Patient Care Team:  Aletha Silva MD as PCP - General (Internal Medicine)     Patient Active Problem List   Diagnosis     Type 2 diabetes mellitus (H)     Mixed hyperlipidemia     Hypertension     Benign Adenomatous Polyp Of The Large Intestine     Allergic Rhinitis     Menopause     Adenocarcinoma Of The Breast     S/P mastectomy     Hyponatremia     Anorexia     Past Medical History:   Diagnosis Date     Adenocarcinoma of breast (H)     left     Allergic rhinitis      Arthritis      Benign adenomatous polyp of large intestine      Breast cancer (H) 2006    Left side     Diabetes (H)      Hx of radiation therapy 2006     Hyperlipidemia      Hypertension      Menopause      S/P mastectomy 12/5/2015      Past Surgical History:   Procedure Laterality Date     BREAST BIOPSY Left 2006     BREAST LUMPECTOMY Left 2006     HYSTERECTOMY  1994     MASTECTOMY Left 12/2015     KS EXCIS BARTHOLIN GLAND/CYST      Description: Excision Of Bartholin's Gland Or Cyst;  Recorded: 07/29/2008;     KS INCISE VESTIBULAR NERVE,TRANSLABYR Left 12/4/2015    Procedure: LEFT  LATISSIMUS DORSI FLAP BREAST RECONSTRUCTION WITH GEL IMPLANT;  Surgeon: Harley Abernathy MD;  Location: [facility] OR;  Service:      VT MASTECTOMY, MODIFIED RADICAL Left 12/4/2015    Procedure: LEFT BREAST MASTECTOMY;  Surgeon: Jeferson Rodriguez MD;  Location: [facility] OR;  Service: General     VT TOTAL ABDOM HYSTERECTOMY      Description: Total Abdominal Hysterectomy;  Recorded: 12/30/2009;  Comments: BSO      Family History   Problem Relation Age of Onset     Depression Mother      Diabetes Mother      Heart disease Mother      Hypertension Mother      Stroke Father      Cancer Maternal Grandmother         Thinks it was stomach cancer, but not sure.     Cancer Paternal Aunt 60        Thinks it was stomach cancer     Diabetes Sister      Heart disease Sister       Social History     Socioeconomic History     Marital status:      Spouse name: Not on file     Number of children: Not on file     Years of education: Not on file     Highest education level: Not on file   Social Needs     Financial resource strain: Not on file     Food insecurity - worry: Not on file     Food insecurity - inability: Not on file     Transportation needs - medical: Not on file     Transportation needs - non-medical: Not on file   Occupational History     Not on file   Tobacco Use     Smoking status: Never Smoker     Smokeless tobacco: Never Used   Substance and Sexual Activity     Alcohol use: No     Drug use: No     Sexual activity: Not on file   Other Topics Concern     Not on file   Social History Narrative     Not on file      Current Outpatient Medications   Medication Sig Dispense Refill     ACCU-CHEK COMPACT PLUS TEST Strp USE 2 OR 3 TIMES DAILY AS DIRECTED 102 strip 1     amLODIPine (NORVASC) 5 MG tablet TAKE 1 TABLET BY MOUTH DAILY 90 tablet 3     anastrozole (ARIMIDEX) 1 mg tablet Take 1 mg by mouth daily.       aspirin 81 MG EC tablet Take 81 mg by mouth daily.       atorvastatin (LIPITOR) 20 MG  tablet TAKE 1 TABLET BY MOUTH DAILY 90 tablet 2     B-complex with vitamin C (VITAMIN B COMPLEX WITH C) cap Take 1 capsule by mouth daily.       blood sugar diagnostic (GLUCOSE BLOOD) Strp Accu-Chek Compact Test Drum In Vitro Strip. Use 2 or 3 times daily as directed.       cholecalciferol, vitamin D3, 1,000 unit tablet Take 1,000 Units by mouth daily.       FLUZONE HIGH-DOSE 2018-19, PF, 180 mcg/0.5 mL Syrg injection        KRILL OIL ORAL Take 1 capsule by mouth daily. 1000 mg oral capsule.        LANCETS MISC Use As Directed. 2 or 3 times daily.       lisinopril (PRINIVIL,ZESTRIL) 40 MG tablet TAKE 1 TABLET BY MOUTH ONCE DAILY 90 tablet 3     lutein 10 mg Tab Take 10 mg by mouth daily.       metFORMIN (GLUCOPHAGE) 500 MG tablet TAKE 2 TABLETS BY MOUTH 2 TIMES A DAY WITH MEALS. NEED TO BE SEEN FOR FURTHER REFILLS. 360 tablet 3     metoprolol succinate (TOPROL-XL) 100 MG 24 hr tablet TAKE 1 TABLET BY MOUTH EVERY DAY 90 tablet 0     MULTIVITAMIN ORAL Take 1 tablet by mouth daily. Tablet.       ondansetron (ZOFRAN-ODT) 4 MG disintegrating tablet Take 4 mg by mouth.       tolterodine (DETROL LA) 2 MG ER capsule Take 1 capsule (2 mg total) by mouth daily. 90 capsule 2     UBIDECARENONE (CO Q-10 ORAL) Take 1 capsule by mouth daily. CAPS       vitamin E/quinine sulfate (QUININE-VITAMIN E ORAL) Take by mouth.       No current facility-administered medications for this visit.       Objective:   Vital Signs:   Visit Vitals  /60   Pulse 78   Wt 114 lb 1.6 oz (51.8 kg)   BMI 22.28 kg/m         VisionScreening:  No exam data present     PHYSICAL EXAM  General: No apparent distress. Calm. Alert and Oriented X3. Pt behavior is appropriate.  Head:Atraumatic. Normocephalic, non-tender to palpation  Neck: Supple. No JVD. Full ROM.   Eyes: PERRL, No discharge. No strabismus. No nystagmus.  Ears: TMs pearly gray with landmarks visible.   Nose/Mouth/Throat: Patent nares, no oral lesions, pharynx clear and without exudate. Uvula  mid-line. Nasal septum mid-line. Clear turbinates.   Lymph: No axillar or cervical adenopathy.   Chest/Lungs: Normal chest wall, clear to auscultation, normal respiratory effort and rate.   Heart/Pulses: Regular rate and rhythm, strong and equal radial pulses, no murmurs. Capillary refill <2 seconds. No edema.   Abdomen: Soft, no palpable masses. No hepatosplenomegaly, no tenderness with palpation noted. Bowel sounds active in all quadrants. No increased tympany.   Genitalia: Not examined.   Musculoskeletal: No CVA tenderness with palpation. Good ROM with extremities.  Patient able to climb up onto examination table with one assist  Neurologic: Interactive, alert, no focal findings, CNs intact.   Skin: Warm, dry. Normal hair pattern. Free of lesions. Normal skin turgor.         Assessment Results 2/12/2019   Activities of Daily Living No help needed   Instrumental Activities of Daily Living 1 - Full function   Mini Cog Total Score 3   Some recent data might be hidden     A Mini-Cog score of 0-2 suggests the possibility of dementia, score of 3-5 suggests no dementia    Identified Health Risks:     The patient was provided with suggestions to help her develop a healthy physical lifestyle.   She is at risk for lack of exercise and has been provided with information to increase physical activity for the benefit of her well-being.  The patient was counseled and encouraged to consider modifying their diet and eating habits. She was provided with information on recommended healthy diet options.  The patient reports that she has difficulty with instrumental activities of daily living.  She was provided with a list of local organizations that provide support services and advised to make a follow up appointment in 6 months to address this further.   The patient was provided with written information regarding signs of hearing loss.  Information on urinary incontinence and treatment options given to patient.  She is at risk for  falling and has been provided with information to reduce the risk of falling at home.  Patient's advanced directive was discussed and I am comfortable with the patient's wishes.

## 2021-06-25 NOTE — PATIENT INSTRUCTIONS - HE
Recheck white blood cell count today.    Breathing sounds good.  Vital signs are normal.    Continue to monitor symptoms.  Okay to use NyQuil at night to help sleep.  Continue with decaffeinated tea with honey/lemon.    If you notice worsening shortness of breath, cough, fever, chills, or chest pain, seek emergent medical evaluation.

## 2021-06-25 NOTE — TELEPHONE ENCOUNTER
----- Message from Dakota Castro CNP sent at 3/20/2019  1:02 PM CDT -----  Please call patient and let her know that her WBC continues to decline closer to normal range.  At this point, I do not recommend any additional lab work.  Follow-up as needed.

## 2021-06-25 NOTE — TELEPHONE ENCOUNTER
----- Message from Yesica Gaines MD sent at 3/19/2019  7:19 PM CDT -----  CA to notify patient of the negative blood culture test results. Follow up with primary provider for further concerns.

## 2021-06-25 NOTE — TELEPHONE ENCOUNTER
Patient Returning Call  Reason for call: Patient calling back  Information relayed to patient:  Below message relayed to patient.   Patient has additional questions:  no  If YES, what are your questions/concerns:  No  Okay to leave a detailed message?:  No call back needed      8:36 AM           ----- Message from Dakota Castro CNP sent at 3/20/2019  1:02 PM CDT -----  Please call patient and let her know that her WBC continues to decline closer to normal range.  At this point, I do not recommend any additional lab work.  Follow-up as needed.

## 2021-06-25 NOTE — PATIENT INSTRUCTIONS - HE
1. Keep well hydrated  2. May alternate Tylenol every 6 hours with ibuprofen every 6 hours as needed for fever or pain  3. Keep your appointment tomorrow 3/15 with Dr. Marcos Silva at 3:25 pm   4. If you have any questions, call the clinic number  - it's answered 24/7  5. If you start chest pain or more shortness of breath, dial 911

## 2021-06-25 NOTE — PROGRESS NOTES
Nalini is an 83-year-old female ex-patient of Dr. Aletha Silva with a history of type 2 diabetes, hypertension, and a history of breast cancer who presents today for follow-up of a walk-in clinic visit yesterday.  She was on a cruise in the last couple of weeks and felt ill at that time.  She spent time in the [facility] and was then transferred to a [facility] hospital.  Dr. Gaines from walk-in clinic summarized this nicely and I am copying the following from her note yesterday:    Reviewed the patient's 21 page medical report from her cruise.  Following this is a synopsis of patient's medical report.   March 5, -day 8 the patient's cruise-patient developed symptoms of influenza.  Tested negative for influenza a and B.  Patient's O2 sat was 95% on room air.  Patient was prescribed Tamiflu for 5 days.  March 6-no entry was made by the cruise medical team  March 7-patient had worsening symptoms and was noted to be at 93% O2 on room air.  She was given ceftriaxone 2 g IV and possibly hydrocortisone 250 mg IM.  Troponin was 0.02, blood glucose 207, she did have a chest x-ray, EKG and venous blood gases.  Lactate was 1.47.  It appears that patient received a second dose of ceftriaxone.  White blood count was 23.6 and repeated to be 22,000.  Patient was also given albuterol nebs.  At some point patient's O2 sat became 90% on room air and then came up to 96% on 4 L. She started to complain of chest tightness and pain down her left arm.  It was thought the patient needed to be transferred to an intensive care unit and she ended up being transferred sometime between 3/7 and 3/8.  Also of note is that patient's sodium was 129, hemoglobin 9.4.  Her CRP was reportedly greater than 200.  3/8-3/13-patient was hospitalized in [facility].  No reports were given to patient during that hospitalization.  She flew home last night and presented here to walk-in clinic.     Yesterday her white blood cell count was 17.4 with a  left shift.  Hemoglobin was slightly decreased at 11.1.  CRP was 0.9.  Sodium 134.  UA showed 5-10 white cells, culture showed no growth.  Blood cultures are pending from the visit.    Currently Nalini feels fairly well.  She has been having some postnasal drainage along with a dry cough.  However she denies any sinus pain or pressure, purulent mucus while coughing, purulent nasal drainage, sore throat, pain with urination, hematuria, abdominal pain, diarrhea, or new skin rashes.  No ear pain or drainage.  I asked her a bit about her hospitalization in [place], but all she knew is that she had an IV in and they were giving her things through the IV.    Objective: Vital signs are as per the EMR.  In general the patient is alert pleasant and in no acute distress.  She appears healthy.    HEENT: Oropharynx is clear.  No anterior posterior cervical lymphadenopathy.  TMs are occluded by cerumen bilaterally.    Cardiovascular: S1-S2 regular rate and rhythm no rubs gallops rubs    Lungs are clear.    Abdomen is soft nontender nondistended.    Assessment and plan: Patient presenting for evaluation of leukocytosis with a left shift after [place] foreign [place].  Unfortunately as noted above we have no records of this hospitalization.  She is not having any signs of active infection currently, and her CRP was only 0.9, making believe that she indeed does not have an active infection.  My hypothesis is that she probably got steroids during her hospital stay in [place], as this is generally standard of care when treating someone of her age with respiratory problems and oxygen desaturations.  Her elevated white count is likely secondary to demargination and this will likely improve over time.  I do not think we need any other evaluation or treatment at this point.  I am going to have her follow-up in 5 days with CINDY Castro.  At that visit I would recommend making sure that she is physically doing well and recheck  her white blood cell count to make sure that it is trending downwards.  If she has any problems in the meantime she should give us a call back.  Otherwise again follow-up in 5 days.

## 2021-06-25 NOTE — PROGRESS NOTES
Provider wore a mask during this visit.     Subjective:   Nalini Sepulveda is a 83 y.o. female  Roomed by: Jessica ALONZO CMA    Accompanied by Daughter    Refills needed? No    Do you have any forms that need to be filled out? No      Chief Complaint   Patient presents with     Cough     started while on a cruise. Was in the hospital in [place] for 5 days and diagnosis with pneumonia   Left on her cruise on 2/25. Started to feel ill about 3/2 and tested positive for Influenza A on 3/5 and treated Tamiflu. Her symptoms of fever and coughing got worse.  She was evaluated in the [facility] on 3/5, and she stayed in the [facility] from 3/6  -  3/8. She was transferred to a hospital in [facility] on 3/8 until 3/13. While in the hospital, she had an IV, O2, got a CXR and possibly antibiotics. The daughter did not communicate with the hospital personnel. Due to transportation issues, patient flew back to [place] yesterday. Says chest feels tight, but denies CP. Admits shortness of breath off and on. Denies feeling sweats or chills since being on the plane. Says her appetite is back to normal. Has been drinking fluids and urinating normal amounts and denies any recent urinary frequency, urgency, dysuria or flank pain. Denies any recent belly pain, vomiting or diarrhea. Denies any recent headache or dizziness.   Reviewed the patient's 21 page medical report from her cruise.  Following this is a synopsis of patient's medical report.   March 5, -day 8 the patient's cruise-patient developed symptoms of influenza.  Tested negative for influenza a and B.  Patient's O2 sat was 95% on room air.  Patient was prescribed Tamiflu for 5 days.  March 6-no entry was made by the cruise medical team  March 7-patient had worsening symptoms and was noted to be at 93% O2 on room air.  She was given ceftriaxone 2 g IV and possibly hydrocortisone 250 mg IM.  Troponin was 0.02, blood glucose 207, she did have a chest x-ray, EKG  and venous blood gases.  Lactate was 1.47.  It appears that patient received a second dose of ceftriaxone.  White blood count was 23.6 and repeated to be 22,000.  Patient was also given albuterol nebs.  At some point patient's O2 sat became 90% on room air and then came up to 96% on 4 L. She started to complain of chest tightness and pain down her left arm.  It was thought the patient needed to be transferred to an intensive care unit and she ended up being transferred sometime between 3/7 and 3/8.  Also of note is that patient's sodium was 129, hemoglobin 9.4.  Her CRP was reportedly greater than 200.  3/8-3/13-patient was hospitalized in [facility].  No reports were given to patient during that hospitalization.  She flew home last night and presented here to walk-in clinic.     Review of Systems  See HPI for ROS, otherwise balance of other systems negative    Allergies   Allergen Reactions     Codeine Hives and Itching       Current Outpatient Medications:      ACCU-CHEK COMPACT PLUS TEST Strp, USE 2 OR 3 TIMES DAILY AS DIRECTED, Disp: 102 strip, Rfl: 1     amLODIPine (NORVASC) 5 MG tablet, TAKE 1 TABLET BY MOUTH DAILY, Disp: 90 tablet, Rfl: 3     anastrozole (ARIMIDEX) 1 mg tablet, Take 1 mg by mouth daily., Disp: , Rfl:      aspirin 81 MG EC tablet, Take 81 mg by mouth daily., Disp: , Rfl:      atorvastatin (LIPITOR) 20 MG tablet, TAKE 1 TABLET BY MOUTH DAILY, Disp: 90 tablet, Rfl: 2     blood sugar diagnostic (GLUCOSE BLOOD) Strp, Accu-Chek Compact Test Drum In Vitro Strip. Use 2 or 3 times daily as directed., Disp: , Rfl:      FLUZONE HIGH-DOSE 2018-19, PF, 180 mcg/0.5 mL Syrg injection, , Disp: , Rfl:      LANCETS MISC, Use As Directed. 2 or 3 times daily., Disp: , Rfl:      lisinopril (PRINIVIL,ZESTRIL) 40 MG tablet, TAKE 1 TABLET BY MOUTH ONCE DAILY, Disp: 90 tablet, Rfl: 3     metFORMIN (GLUCOPHAGE) 500 MG tablet, TAKE 2 TABLETS BY MOUTH 2 TIMES A DAY WITH MEALS. NEED TO BE SEEN FOR FURTHER  REFILLS., Disp: 360 tablet, Rfl: 3     metoprolol succinate (TOPROL-XL) 100 MG 24 hr tablet, TAKE 1 TABLET BY MOUTH EVERY DAY, Disp: 90 tablet, Rfl: 0     MULTIVITAMIN ORAL, Take 1 tablet by mouth daily. Tablet., Disp: , Rfl:      tolterodine (DETROL LA) 2 MG ER capsule, Take 1 capsule (2 mg total) by mouth daily., Disp: 90 capsule, Rfl: 2     vitamin E/quinine sulfate (QUININE-VITAMIN E ORAL), Take by mouth., Disp: , Rfl:      B-complex with vitamin C (VITAMIN B COMPLEX WITH C) cap, Take 1 capsule by mouth daily., Disp: , Rfl:      cholecalciferol, vitamin D3, 1,000 unit tablet, Take 1,000 Units by mouth daily., Disp: , Rfl:      KRILL OIL ORAL, Take 1 capsule by mouth daily. 1000 mg oral capsule. , Disp: , Rfl:      lutein 10 mg Tab, Take 10 mg by mouth daily., Disp: , Rfl:      ondansetron (ZOFRAN-ODT) 4 MG disintegrating tablet, Take 4 mg by mouth., Disp: , Rfl:      UBIDECARENONE (CO Q-10 ORAL), Take 1 capsule by mouth daily. CAPS, Disp: , Rfl:   Patient Active Problem List   Diagnosis     Type 2 diabetes mellitus (H)     Mixed hyperlipidemia     Hypertension     Benign Adenomatous Polyp Of The Large Intestine     Allergic Rhinitis     Menopause     Adenocarcinoma Of The Breast     S/P mastectomy     Hyponatremia     Anorexia     Past Medical History:   Diagnosis Date     Adenocarcinoma of breast (H)     left     Allergic rhinitis      Arthritis      Benign adenomatous polyp of large intestine      Breast cancer (H) 2006    Left side     Diabetes (H)      Hx of radiation therapy 2006     Hyperlipidemia      Hypertension      Menopause      S/P mastectomy 12/5/2015    - if none on file, see Problem List    Objective:     Vitals:    03/14/19 1224   BP: 132/71   Patient Site: Right Arm   Patient Position: Sitting   Cuff Size: Adult Regular   Pulse: 90   Resp: 16   Temp: 98.2  F (36.8  C)   TempSrc: Oral   SpO2: 96%   Weight: 106 lb 4 oz (48.2 kg)   Gen - Pt in NAD  Eyes - Conjunctiva non injected, no  drainage  Face - non TTP over frontal sinus areas; not TTP over maxillary areas  Ears - external canals - no induration, Right TM - not injected, Left TM - no injected   Nose - not congested, no nasal drainage  Pharynx - not injected, tonsils 1+ size  Neck - supple, no cervical adenopathy, no masses  Cor - RRR w/o murmur  Lungs - Good air entry; no wheezes, trace of crackles at bases noted on auscultation - sporadic dry coughing noted  Skin - no lesions, no rashes noted    Results for orders placed or performed in visit on 03/14/19   C-Reactive Protein(CRP)   Result Value Ref Range    CRP 0.9 (H) 0.0 - 0.8 mg/dL   ISTAT CHEM 7 (HE CLINIC ONLY)   Result Value Ref Range    Sodium 134 (L) 136 - 145 mmol/L    Potassium 4.5 3.5 - 5.5 mmol/L    CO2 26 22 - 31 mmol/L    Chloride 95 (L) 98 - 107 mmol/L    Anion Gap, Calculation 13 5 - 18 mmol/L    Glucose 284 (H) 70 - 125 mg/dL    BUN 25 8 - 28 mg/dL    Creatinine 0.90 0.70 - 1.30 mg/dL   HM1 (CBC with Diff)   Result Value Ref Range    WBC 17.4 (H) 4.0 - 11.0 thou/uL    RBC 3.43 (L) 3.80 - 5.40 mill/uL    Hemoglobin 11.1 (L) 12.0 - 16.0 g/dL    Hematocrit 33.5 (L) 35.0 - 47.0 %    MCV 98 80 - 100 fL    MCH 32.4 27.0 - 34.0 pg    MCHC 33.1 32.0 - 36.0 g/dL    RDW 12.2 11.0 - 14.5 %    Platelets 465 (H) 140 - 440 thou/uL    MPV 11.4 8.5 - 12.5 fL    Neutrophils % 71 (H) 50 - 70 %    Lymphocytes % 18 (L) 20 - 40 %    Monocytes % 10 2 - 10 %    Eosinophils % 1 0 - 6 %    Basophils % 1 0 - 2 %    Neutrophils Absolute 11.7 (H) 2.0 - 7.7 thou/uL    Lymphocytes Absolute 3.1 0.8 - 4.4 thou/uL    Monocytes Absolute 1.7 (H) 0.0 - 0.9 thou/uL    Eosinophils Absolute 0.2 0.0 - 0.4 thou/uL    Basophils Absolute 0.1 0.0 - 0.2 thou/uL   Urinalysis-UC if Indicated   Result Value Ref Range    Color, UA Yellow Colorless, Yellow, Straw, Light Yellow    Clarity, UA Clear Clear    Glucose,  mg/dL (!) Negative    Bilirubin, UA Negative Negative    Ketones, UA Negative Negative    Specific  Gravity, UA 1.025 1.005 - 1.030    Blood, UA Trace (!) Negative    pH, UA 6.0 5.0 - 8.0    Protein, UA 30 mg/dL (!) Negative mg/dL    Urobilinogen, UA 0.2 E.U./dL 0.2 E.U./dL, 1.0 E.U./dL    Nitrite, UA Negative Negative    Leukocytes, UA Trace (!) Negative    Bacteria, UA Few (!) None Seen hpf    RBC, UA 0-2 None Seen, 0-2 hpf    WBC, UA 5-10 (!) None Seen, 0-5 hpf    Squam Epithel, UA 0-5 None Seen, 0-5 lpf   Lab result discussed on day of visit.     Xr Chest 2 Views    Result Date: 3/14/2019  XR CHEST 2 VIEWS 3/14/2019 1:31 PM INDICATION: Recently treated for clinical influenza, but continues to have coughing with sob. COMPARISON: 4/13/2017. FINDINGS: Lungs are clear. No pleural effusion. Heart size normal. Bones are demineralized. Postop change left breast. Left shoulder arthroplasty.     Radiologist's report discussed with patient and daughter on day of visit.      Assessment - Plan   Medical Decision Making -83-year-old woman presents with a complicated history of being on a cruise, having influenza-like symptoms and being treated with Tamiflu.  Patient then began to have more respiratory distress more respiratory compromise with O2 sats to 90 on room air, complaining of chest pain and eventually being transferred to a [facility] hospital for 5 days.  [facility] have a leukocytosis to 22,000 with increased neutrophils, hyponatremia, hyperglycemia and anemia.  Patient presents with cough and shortness of breath.  On exam she is afebrile and vital signs are stable.  Patient did have a few crackles at the bases.  Spoke with Dr. Marcos Silva, internal medicine who suggested that we get a repeat urinalysis and blood cultures.  We were only able to get one set of blood cultures because patient had already been drawn once for the above labs.  Patient seems to be stable enough to return to home.  Her daughter said that she would spend the night with her and bring her to see  Dr. Silva tomorrow afternoon.  Strongly recommended that if patient has any chest pain or shortness of breath she should call 911.  See patient instructions.  Patient's urinalysis is going to culture.  At this time antibiotics were not prescribed.  Today's chest x-ray did rule out pneumonia, CHF or pleural effusion.    1. Leukocytosis, unspecified type  - HM1(CBC and Differential)  - HM1 (CBC with Diff)  - Urinalysis-UC if Indicated  - Blood culture from PERIPHERAL SITE  - Culture, Urine    2. Influenza-like syndrome  - XR Chest 2 Views; Future  - C-Reactive Protein(CRP)    3. Hyponatremia  - ISTAT CHEM 7 ([facility] CLINIC ONLY)    4. Hyperglycemia    5. Anemia, unspecified type    6. Dyspnea, unspecified type    60 minutes spent with the patient with greater than 50% of time spent discussing symptoms, treatment options, counseling and/or coordination of care.     At the conclusion of the encounter, assessment and plan were discussed.   All questions were answered.   The patient or guardian acknowledged understanding and was involved in the decision making regarding the overall care plan.    Patient Instructions   1. Keep well hydrated  2. May alternate Tylenol every 6 hours with ibuprofen every 6 hours as needed for fever or pain  3. Keep your appointment tomorrow 3/15 with Dr. Marcos Silva at 3:25 pm   4. If you have any questions, call the clinic number  - it's answered 24/7  5. If you start chest pain or more shortness of breath, dial 911

## 2021-06-25 NOTE — PROGRESS NOTES
Clinic Note    Assessment:     Assessment and Plan:  1. Leukocytosis, unspecified type  She was recently on a cruise but became ill.  She ended up being hospitalized in [place] for about 5 days.  She was seen at [facility] last week, where her WBC was still a bit elevated.  She was told to follow-up today for recheck.  Patient condition is continuing to improve.  See patient instructions below for plan of care.  - HM2(CBC w/o Differential)       Patient Instructions   Recheck white blood cell count today.    Breathing sounds good.  Vital signs are normal.    Continue to monitor symptoms.  Okay to use NyQuil at night to help sleep.  Continue with decaffeinated tea with honey/lemon.    If you notice worsening shortness of breath, cough, fever, chills, or chest pain, seek emergent medical evaluation.        Return in about 6 months (around 9/20/2019).         Subjective:      Patient comes to clinic today for follow-up of a cough.    Patient was last seen by her PCP on 3/15, approximately 5 days ago.    Please see Dr. Marcos Silva's note for a more comprehensive HPI.  In essence, patient was on a cruise when she became ill.  She spent time in the [facility] and was transferred to a [facility] hospital.    She was seen at [facility], and our clinic for follow-up.  Her white blood cell count has been trending down.  It was thought that patient was likely given steroids during her hospitalization in [place] and that this could be contributing to her leukocytosis.  She was encouraged to follow-up in clinic for recheck of CBC.    Today, patient states she is feeling well.  No fevers.  No chest pain.  No shortness of breath.  Her voice is hoarse.  However, in general, she is noticing incremental improvement in her condition.  She still has a mild cough, which seems to be worse at night and has been giving her difficulty with sleeping.    The following portions of the patient's history were reviewed and updated as appropriate: Allergies,  medications, problems, prior note.    Review of Systems:    Review is otherwise negative except for what is mentioned above.     Social Hx:    Social History     Tobacco Use   Smoking Status Never Smoker   Smokeless Tobacco Never Used         Objective:     Vitals:    03/20/19 1050   BP: 100/50   Pulse: 74   SpO2: 99%   Weight: 107 lb (48.5 kg)       Exam:    General: No apparent distress. Calm. Alert and Oriented X3. Pt behavior is appropriate.  Head:Atraumatic. Normocephalic, non-tender to palpation  Neck: Supple. No JVD. Full ROM. No adenopathy  Eyes: PERRL, No discharge. No strabismus. No nystagmus.  Ears: TMs pearly gray with landmarks visible.   Nose/Mouth/Throat: Patent nares, no oral lesions, pharynx clear and without exudate. Uvula mid-line. Nasal septum mid-line. Clear turbinates.   Lymph: No axillar or cervical adenopathy.   Chest/Lungs: Normal chest wall, clear to auscultation, normal respiratory effort and rate.   Heart/Pulses: Regular rate and rhythm, strong and equal radial pulses, no murmurs. Capillary refill <2 seconds. No edema.         Patient Active Problem List   Diagnosis     Type 2 diabetes mellitus (H)     Mixed hyperlipidemia     Hypertension     Benign Adenomatous Polyp Of The Large Intestine     Allergic Rhinitis     Menopause     Adenocarcinoma Of The Breast     S/P mastectomy     Hyponatremia     Current Outpatient Medications   Medication Sig Dispense Refill     ACCU-CHEK COMPACT PLUS TEST Strp USE 2 OR 3 TIMES DAILY AS DIRECTED 102 strip 1     amLODIPine (NORVASC) 5 MG tablet TAKE 1 TABLET BY MOUTH DAILY 90 tablet 3     anastrozole (ARIMIDEX) 1 mg tablet Take 1 mg by mouth daily.       aspirin 81 MG EC tablet Take 81 mg by mouth daily.       atorvastatin (LIPITOR) 20 MG tablet TAKE 1 TABLET BY MOUTH DAILY 90 tablet 2     B-complex with vitamin C (VITAMIN B COMPLEX WITH C) cap Take 1 capsule by mouth daily.       blood sugar diagnostic (GLUCOSE BLOOD) Strp Accu-Chek Compact Test Drum In  Vitro Strip. Use 2 or 3 times daily as directed.       cholecalciferol, vitamin D3, 1,000 unit tablet Take 1,000 Units by mouth daily.       FLUZONE HIGH-DOSE 2018-19, PF, 180 mcg/0.5 mL Syrg injection        KRILL OIL ORAL Take 1 capsule by mouth daily. 1000 mg oral capsule.        LANCETS MISC Use As Directed. 2 or 3 times daily.       lisinopril (PRINIVIL,ZESTRIL) 40 MG tablet TAKE 1 TABLET BY MOUTH ONCE DAILY 90 tablet 3     loratadine (CLARITIN) 10 mg tablet Take 10 mg by mouth daily.       lutein 10 mg Tab Take 10 mg by mouth daily.       metFORMIN (GLUCOPHAGE) 500 MG tablet TAKE 2 TABLETS BY MOUTH 2 TIMES A DAY WITH MEALS. NEED TO BE SEEN FOR FURTHER REFILLS. 360 tablet 3     metoprolol succinate (TOPROL-XL) 100 MG 24 hr tablet TAKE 1 TABLET BY MOUTH EVERY DAY 90 tablet 0     MULTIVITAMIN ORAL Take 1 tablet by mouth daily. Tablet.       ondansetron (ZOFRAN-ODT) 4 MG disintegrating tablet Take 4 mg by mouth.       tolterodine (DETROL LA) 2 MG ER capsule Take 1 capsule (2 mg total) by mouth daily. 90 capsule 2     UBIDECARENONE (CO Q-10 ORAL) Take 1 capsule by mouth daily. CAPS       vitamin E/quinine sulfate (QUININE-VITAMIN E ORAL) Take by mouth.       No current facility-administered medications for this visit.        I spent 25 minutes with patient face to face, of which >50% was counseling regarding the above plan       Dakota Castro (Rob), LJ    3/20/2019

## 2021-07-03 NOTE — ADDENDUM NOTE
Addendum Note by Brie Pérez RT (R) at 10/20/2020  1:40 PM     Author: Brie Pérez RT (R) Service: -- Author Type: Radiologic Technologist    Filed: 10/20/2020  2:53 PM Encounter Date: 10/20/2020 Status: Signed    : Brie Pérez RT (R) (Radiologic Technologist)    Addended by: BRIE PÉREZ on: 10/20/2020 02:53 PM        Modules accepted: Orders

## 2021-07-03 NOTE — ADDENDUM NOTE
Addendum Note by El Castro CNP at 11/29/2017 12:50 PM     Author: El Castro CNP Service: -- Author Type: Nurse Practitioner    Filed: 11/29/2017 12:50 PM Encounter Date: 11/29/2017 Status: Signed    : El Castro CNP (Nurse Practitioner)    Addended by: EL CASTRO on: 11/29/2017 12:50 PM        Modules accepted: Orders

## 2021-07-22 NOTE — PROGRESS NOTES
Assessment & Plan     Nalini was seen today for establish care.    Diagnoses and all orders for this visit:    Hypertension, patient's blood pressure is controlled at 138/62.  She is on amlodipine 5 mg daily, lisinopril 40 mg daily, metoprolol extended release 100 mg daily.  BMP December 15, 2020, estimated GFR 54, potassium 4.5.  Discussed with patient.  -     metoprolol succinate (TOPROL-XL) 100 MG 24 hr tablet; Take 1 tablet (100 mg total) by mouth daily.  -     lisinopriL (PRINIVIL,ZESTRIL) 40 MG tablet; Take 1 tablet (40 mg total) by mouth daily.  -     amLODIPine (NORVASC) 5 MG tablet; Take 1 tablet (5 mg total) by mouth daily.    Type 2 diabetes mellitus (H), HbA1c 6.6%, October 28, 2020.  Patient is on Metformin 500 mg twice a day.  If her HbA1c continues to be this low, we can decrease her Metformin.  Patient is 103 pounds.  She is on a 5 foot in height.  She has been diabetic for 10-15 years.  -     metFORMIN (GLUCOPHAGE) 500 MG tablet; Take 1 tablet (500 mg total) by mouth 2 (two) times a day with meals.  -     Basic Metabolic Panel    Mixed hyperlipidemia, she is on Lipitor 20 mg daily.  LDL 56, February 27, 2020.  Enzymes normal October 28, 2020.  -     atorvastatin (LIPITOR) 20 MG tablet; Take 1 tablet (20 mg total) by mouth daily.  -     Hepatic Profile    Osteoporosis without current pathological fracture, unspecified osteoporosis type.  Patient is on alendronate weekly.  She has been on this for about 2 years.  -     alendronate (FOSAMAX) 70 MG tablet; Take 1 tablet (70 mg total) by mouth every 7 days. Take in the morning on an empty stomach with a full glass of water 30 minutes before food    Chronic midline back pain, unspecified back location  -     acetaminophen (TYLENOL EXTRA STRENGTH) 500 MG tablet; Take 2 tablets (1,000 mg total) by mouth 2 (two) times a day.    Anemia due to other cause, not classified,   -     HM1(CBC and Differential)  -     Ferritin  -     Iron and Transferrin Iron  Binding Capacity  -     Transferrin  -     Hepatic Profile    Loss of weight, patient has been losing weight.  HbA1c is 6.6%.  Can consider cutting back on this, particularly if weight loss is ongoing.  -     Hepatic Profile    Hyperlipidemia LDL goal <100, as above.    Abnormal finding of blood chemistry, unspecified, anemia.  Hemoglobin 11, December 15, 2020.  Will check iron studies.  No abdominal pain to suggest heartburn, or dyspepsia.  -     Ferritin  -     Iron and Transferrin Iron Binding Capacity  -     Transferrin    Insomnia, patient says that she is using mirtazapine only as needed.  Patient is on aspirin 81 mg daily, she does not have a history of a stroke or a heart attack.    She has osteoarthritis involving her the knees and her back.  She uses a cane.    History of breast cancer, with left-sided lumpectomy, 10-15 years ago, followed by radiation.  Then she had a left mastectomy 8 years ago.  She was placed on a pill after this.    She has been seen by a neurologist, for tremors.    50 minutes spent on the date of the encounter doing chart review, review of outside records, review of test results, patient visit, documentation and discussion with family, as recorded.        Return in about 6 months (around 12/2/2021), or Follow with Dr. Mullen, 40 minutes Annual wellness..    Betsy Mullen MD  [facility]    Subjective   Nalini Sepulveda is 85 y.o. and presents today for the following health issues     HPI   Patient is here to establish primary care.  Type 2 diabetes mellitus [on Metformin 1000 mg twice a day], hypertension [amlodipine 5 mg daily, lisinopril 40 mg daily, metoprolol 100 mg daily], sleep apnea.  Breast cancer, with previous mastectomy.  Hyperlipidemia, on Lipitor 20 mg daily.  Insomnia, on Remeron 7.5 mg at bedtime.    Her daughter Sanaz has come with her to the clinic.    Social History     Social History Narrative    States that she no longer  drives.  She reports that she is originally [place] has not been out there for nearly 20 years.  She reports that she enjoyed living in [place] taking the train to [facility].  She reports that she would like to exercise and go to the [facility].        Patient lives in a house with her dog [son owns the house].  Twice a day, with some delivers her pills and make sure is that she takes them.  He also make sure that the dog is being taken care of.  Her daughter is also: Check.  There are cameras in the house,, for safety as well.        Betsy Mullen MD    6/27/2021           Past Surgical History:   Procedure Laterality Date     BREAST BIOPSY Left 2006     BREAST LUMPECTOMY Left 2006     HYSTERECTOMY  1994     MASTECTOMY Left 12/2015     GA EXCIS BARTHOLIN GLAND/CYST      Description: Excision Of Bartholin's Gland Or Cyst;  Recorded: 07/29/2008;     GA INCISE VESTIBULAR NERVE,TRANSLABYR Left 12/4/2015    Procedure: LEFT LATISSIMUS DORSI FLAP BREAST RECONSTRUCTION WITH GEL IMPLANT;  Surgeon: Harley Abernathy MD;  Location: [facility];  Service:      GA MASTECTOMY, MODIFIED RADICAL Left 12/4/2015    Procedure: LEFT BREAST MASTECTOMY;  Surgeon: Jeferson Rodriguez MD;  Location: [facility];  Service: General     GA TOTAL ABDOM HYSTERECTOMY      Description: Total Abdominal Hysterectomy;  Recorded: 12/30/2009;  Comments: [facility]     Results for DURGA ASCENCIO (MRN [id number]) as of 6/2/2021 13:20   Ref. Range 12/15/2020 14:31   Sodium Latest Ref Range: 136 - 145 mmol/L 142   Potassium Latest Ref Range: 3.5 - 5.0 mmol/L 4.5   Chloride Latest Ref Range: 98 - 107 mmol/L 106   CO2 Latest Ref Range: 22 - 31 mmol/L 23   Anion Gap, Calculation Latest Ref Range: 5 - 18 mmol/L 13   BUN Latest Ref Range: 8 - 28 mg/dL 17   Creatinine Latest Ref Range: 0.60 - 1.10 mg/dL 0.99   GFR MDRD Af Amer Latest Ref Range: >60 mL/min/1.73m2 >60   GFR MDRD Non Af Amer Latest  Ref Range: >60 mL/min/1.73m2 54 (L)   Calcium Latest Ref Range: 8.5 - 10.5 mg/dL 8.7     Results for DURGA ASCENCIO (MRN [id number]) as of 6/2/2021 13:20   Ref. Range 12/15/2020 14:31   WBC Latest Ref Range: 4.0 - 11.0 thou/uL 11.5 (H)   RBC Latest Ref Range: 3.80 - 5.40 mill/uL 3.16 (L)   Hemoglobin Latest Ref Range: 12.0 - 16.0 g/dL 11.0 (L)   Hematocrit Latest Ref Range: 35.0 - 47.0 % 32.0 (L)   MCV Latest Ref Range: 80 - 100 fL 101 (H)   MCH Latest Ref Range: 27.0 - 34.0 pg 34.8 (H)   MCHC Latest Ref Range: 32.0 - 36.0 g/dL 34.3   RDW Latest Ref Range: 11.0 - 14.5 % 11.4   Platelets Latest Ref Range: 140 - 440 thou/uL 199   MPV Latest Ref Range: 7.0 - 10.0 fL 10.0     Results for GIOVANNI ASCENCIOLENIGEL MASON (MRN [id number]) as of 6/2/2021 13:20   Ref. Range 2/12/2019 11:49 3/14/2019 13:13 3/20/2019 11:30 11/22/2019 13:32 10/20/2020 14:01 12/15/2020 14:31   Hemoglobin Latest Ref Range: 12.0 - 16.0 g/dL 11.4 (L) 11.1 (L) 10.9 (L) 12.8 11.0 (L) 11.0 (L)     Results for GIOVANNI ASCENCIOLEEN ISABELLA (MRN [id number]) as of 6/2/2021 13:20   Ref. Range 11/22/2019 13:32 2/27/2020 11:48 10/20/2020 14:01   Hemoglobin A1c Latest Ref Range: <=5.6 % 7.0 (H) 6.6 (H) 6.6 (H)     Results for DURGA ASCENCIO (MRN [id number]) as of 6/2/2021 13:20   Ref. Range 11/22/2019 13:32 2/27/2020 11:48 10/20/2020 14:01   Vitamin B-12 Latest Ref Range: 213 - 816 pg/mL 339 316 804     Results for DURGA ASCENCIO (MRN [id number]) as of 6/2/2021 13:20   Ref. Range 2/27/2020 11:48   Vitamin D, Total (25-Hydroxy) Latest Ref Range: 30.0 - 80.0 ng/mL 34.4     Results for DUGRA ASCENCIO (MRN [id number]) as of 6/2/2021 13:20   Ref. Range 2/27/2020 11:48   LDL Calculated Latest Ref Range: <=129 mg/dL 56       Review of Systems  Rest the review of systems is negative.      Objective    /62 (Patient Site: Right Arm, Patient Position: Sitting)   Pulse 74   Ht 5' (1.524 m)   Wt 103 lb (46.7 kg)   SpO2 98%   BMI 20.12 kg/m    Body mass index is 20.12 kg/m .  Physical Exam    Patient has a cane.  She defers to her daughter answering questions.  She is not distressed.  Her chest is clear.  Normal heart sounds, do not hear any murmurs.

## 2021-12-01 NOTE — PROGRESS NOTES
SUBJECTIVE:   Durga Sepulveda is a 86 year old female who presents for Preventive Visit.    Results for DURGA SEPULVEDA (MRN [id number]) as of 12/1/2021 12:12   Ref. Range 10/20/2020 14:01 12/15/2020 14:31 6/2/2021 14:15   Hemoglobin Latest Ref Range: 12.0 - 16.0 g/dL 11.0 (L) 11.0 (L) 10.8 (L)   Hematocrit Latest Ref Range: 35.0 - 47.0 % 32.7 (L) 32.0 (L) 32.4 (L)   Platelet Count Latest Ref Range: 140 - 440 thou/uL 464 (H) 199 221   RBC Count Latest Ref Range: 3.80 - 5.40 mill/uL 3.27 (L) 3.16 (L) 3.10 (L)   MCV Latest Ref Range: 80 - 100 fL 100 101 (H) 105 (H)     Iron studies do not look like iron deficiency.    Hypertension, patient's blood pressure is controlled at 138/62.  She is on amlodipine 5 mg daily, lisinopril 40 mg daily, metoprolol extended release 100 mg daily.  BMP December 15, 2020, estimated GFR 54, potassium 4.5.  Discussed with patient.  -     metoprolol succinate (TOPROL-XL) 100 MG 24 hr tablet; Take 1 tablet (100 mg total) by mouth daily.  -     lisinopriL (PRINIVIL,ZESTRIL) 40 MG tablet; Take 1 tablet (40 mg total) by mouth daily.  -     amLODIPine (NORVASC) 5 MG tablet; Take 1 tablet (5 mg total) by mouth daily.     Type 2 diabetes mellitus (H), HbA1c 6.6%, October 28, 2020.  Patient is on Metformin 500 mg twice a day.  If her HbA1c continues to be this low, we can decrease her Metformin.  Patient is 103 pounds.  She is on a 5 foot in height.  She has been diabetic for 10-15 years.  -     metFORMIN (GLUCOPHAGE) 500 MG tablet; Take 1 tablet (500 mg total) by mouth 2 (two) times a day with meals.  -     Basic Metabolic Panel     Mixed hyperlipidemia, she is on Lipitor 20 mg daily.  LDL 56, February 27, 2020.  Enzymes normal October 28, 2020.  -     atorvastatin (LIPITOR) 20 MG tablet; Take 1 tablet (20 mg total) by mouth daily.  -     Hepatic Profile     Osteoporosis without current pathological fracture, unspecified osteoporosis type.  Patient is on alendronate weekly.  She has been on this  "for about 2 years.  -     alendronate (FOSAMAX) 70 MG tablet; Take 1 tablet (70 mg total) by mouth every 7 days. Take in the morning on an empty stomach with a full glass of water 30 minutes before food     Chronic midline back pain, unspecified back location  -     acetaminophen (TYLENOL EXTRA STRENGTH) 500 MG tablet; Take 2 tablets (1,000 mg total) by mouth 2 (two) times a day.     Anemia due to other cause, not classified,   -     HM1(CBC and Differential)  -     Ferritin  -     Iron and Transferrin Iron Binding Capacity  -     Transferrin  -     Hepatic Profile     Loss of weight, patient has been losing weight.  HbA1c is 6.6%.  Can consider cutting back on this, particularly if weight loss is ongoing.  -     Hepatic Profile     Hyperlipidemia LDL goal <100, as above.     Abnormal finding of blood chemistry, unspecified, anemia.  Hemoglobin 11, December 15, 2020.  Will check iron studies.  No abdominal pain to suggest heartburn, or dyspepsia.  Iron studies don't look like an iron deficiency.     Insomnia, patient says that she is using mirtazapine only as needed.  Patient is on aspirin 81 mg daily, she does not have a history of a stroke or a heart attack.     She has osteoarthritis involving her the knees and her back.  She uses a cane.     History of breast cancer, with left-sided lumpectomy, 10-15 years ago, followed by radiation.  Then she had a left mastectomy 8 years ago.  She was placed on a pill after this.     She has been seen by a neurologist, for tremors.       Patient has been advised of split billing requirements and indicates understanding: Yes   Are you in the first 12 months of your Medicare coverage?  No    Healthy Habits:     In general, how would you rate your overall health?  Fair    Frequency of exercise:  None    Do you usually eat at least 4 servings of fruit and vegetables a day, include whole grains    & fiber and avoid regularly eating high fat or \"junk\" foods?  No    Taking " medications regularly:  Yes    Medication side effects:  None    Ability to successfully perform activities of daily living:  Transportation requires assistance, shopping requires assistance, preparing meals requires assistance, housework requires assistance, bathing requires assistance, laundry requires assistance, medication administration requires assistance and money management requires assistance    Home Safety:  No safety concerns identified    Hearing Impairment:  Difficulty following a conversation in a noisy restaurant or crowded room, feel that people are mumbling or not speaking clearly, need to ask people to speak up or repeat themselves and difficulty understanding soft or whispered speech    In the past 6 months, have you been bothered by leaking of urine?  No    In general, how would you rate your overall mental or emotional health?  Good      PHQ-2 Total Score: 3    Additional concerns today:  No   PHQ-2 is 0 actually.      Do you feel safe in your environment? Yes    Have you ever done Advance Care Planning? (For example, a Health Directive, POLST, or a discussion with a medical provider or your loved ones about your wishes): Yes, advance care planning is on file.      Fall risk, she is wobbly, uses a cane, has fallen a couple of times.  No injuries.      Patient has issues with hearing but her daughter feels that she doesn't need any hearing aides as she manages on the phone.    Cognitive Screening   1) Repeat 3 items (Leader, Season, Table)    2) Clock draw: NORMAL  3) 3 item recall: Recalls NO objects   Results: NORMAL clock, But 0 recall    Daughter has no concerns, patient lives alone with family oversight. No concerns for safety.       Do you have sleep apnea, excessive snoring or daytime drowsiness?: yes, to daytime drowsiness, doses off all the time, according to her daughter    Reviewed and updated as needed this visit by clinical staff  Tobacco  Allergies  Meds             Reviewed and  "updated as needed this visit by Provider               Social History     Tobacco Use     Smoking status: Never Smoker     Smokeless tobacco: Never Used   Substance Use Topics     Alcohol use: No         Alcohol Use 12/1/2021   Prescreen: >3 drinks/day or >7 drinks/week? No       Current providers sharing in care for this patient include:   Patient Care Team:  Betsy Mullen MD as PCP - General (Internal Medicine)  Betsy Mullen MD as Assigned PCP    The following health maintenance items are reviewed in Epic and correct as of today:  Health Maintenance Due   Topic Date Due     EYE EXAM  Never done     MAMMO SCREENING  01/02/2021     LIPID  02/27/2021     MICROALBUMIN  02/27/2021       Pertinent mammograms are reviewed under the imaging tab.    Patient is still doing grams, over the right breast.  She has history of hysterectomy on the left, 10-15 years ago.    Patient says that she has never had chickenpox.  Her antibodies.  As a result she does not feel that she needs the shingles vaccine.      Review of Systems  Rest of the review systems is negative.    OBJECTIVE:   /70 (BP Location: Right arm, Patient Position: Sitting, Cuff Size: Adult Regular)   Pulse 75   Ht 1.499 m (4' 11\")   Wt 49.9 kg (110 lb)   SpO2 99%   BMI 22.22 kg/m   Estimated body mass index is 22.22 kg/m  as calculated from the following:    Height as of this encounter: 1.499 m (4' 11\").    Weight as of this encounter: 49.9 kg (110 lb).  Physical Exam  Patient says that she is a bit wobbly.  Has a cane.  Is interactive and alert.  Not distressed.  Chest is clear, no crackles, no wheezes.  Normal heart sounds, no murmurs.  Some decreased memory [unable to tell me what kind of talk she has, her daughter answers this question].  Did foot care exam, normal monofilament testing.  No ulceration.  Her skin is dry.  No peripheral edema.  No hair.  Some red patches on her feet.  Dorsalis pedis pulses are " palpable.    ASSESSMENT / PLAN:       ICD-10-CM    1. Preventative health care, referral for right-sided mammogram.  Patient has had a left-sided mastectomy, for breast cancer 10-15 years ago.  She is up-to-date with her health care maintenance.  Given the foot care list, for nail care. Z00.00    2. Type 2 diabetes mellitus without complication, without long-term current use of insulin (H),  HbA1c 6.6%.  This was 6.6% October 2020 in addition.  Patient is on Metformin 500 mg twice a day.  This could be decreased to 500 mg daily. E11.9 Albumin Random Urine Quantitative with Creat Ratio     Hemoglobin A1c (aka HBA1C)     Hemoglobin A1c (aka HBA1C)   3. Hyperlipidemia LDL goal <100, patient is on Lipitor 20 mg daily.  HDL is low at 37.  We could cut back on her Lipitor to 10 mg. E78.5    4. Essential hypertension, blood pressure is controlled at 138/70.  Patient is on amlodipine 5 mg daily, lisinopril 40 mg daily. I10 LDL cholesterol direct     LDL cholesterol direct   5. Macrocytosis, with anemia.  Discussed with the patient and her daughter.  We will repeat CBC just to make sure that this is stable.  Hemoglobin today is 10.5, .  Iron studiesDo not reflect iron deficiency.  Vitamin B12 has been on the lower side of normal, at 316, every 27 2028.  However today December 1, 2021, this is normal at 610.  Patient is on B complex vitamins. D75.89    6. Anemia due to other cause, not classified, as above.  D64.89 CBC with platelets and differential     Folate RBC     Hematocrit     Vitamin B12     CBC with platelets and differential     Folate RBC     Vitamin B12     CANCELED: Hematocrit   7. Visit for screening mammogram  Z12.31 MA Screen Right w/Aramis       Patient has been advised of split billing requirements and indicates understanding: Yes  COUNSELING:  Reviewed preventive health counseling, as reflected in patient instructions       Hearing screening       Fall risk prevention    Estimated body mass index is  "22.22 kg/m  as calculated from the following:    Height as of this encounter: 1.499 m (4' 11\").    Weight as of this encounter: 49.9 kg (110 lb).        She reports that she has never smoked. She has never used smokeless tobacco.      Appropriate preventive services were discussed with this patient, including applicable screening as appropriate for cardiovascular disease, diabetes, osteopenia/osteoporosis, and glaucoma.  As appropriate for age/gender, discussed screening for colorectal cancer, prostate cancer, breast cancer, and cervical cancer. Checklist reviewing preventive services available has been given to the patient.    Reviewed patients plan of care and provided an AVS. The Basic Care Plan (routine screening as documented in Health Maintenance) for Nalini meets the Care Plan requirement. This Care Plan has been established and reviewed with the Patient and daughter.    Counseling Resources:  Patient and her daughter will arrange for her to see ophthalmology.     Betsy Mullen MD  [facility]    Identified Health Risks:  Answers for HPI/ROS submitted by the patient on 12/1/2021  PHQ9 TOTAL SCORE: 13      "

## 2021-12-01 NOTE — PROGRESS NOTES
Patient is here for a preventative exam.  I saw her to establish primary care June 2, 2021    Hypertension, patient's blood pressure is controlled at 138/62.  She is on amlodipine 5 mg daily, lisinopril 40 mg daily, metoprolol extended release 100 mg daily.  BMP December 15, 2020, estimated GFR 54, potassium 4.5.  Discussed with patient.  -     metoprolol succinate (TOPROL-XL) 100 MG 24 hr tablet; Take 1 tablet (100 mg total) by mouth daily.  -     lisinopriL (PRINIVIL,ZESTRIL) 40 MG tablet; Take 1 tablet (40 mg total) by mouth daily.  -     amLODIPine (NORVASC) 5 MG tablet; Take 1 tablet (5 mg total) by mouth daily.     Type 2 diabetes mellitus (H), HbA1c 6.6%, October 28, 2020.  Patient is on Metformin 500 mg twice a day.  If her HbA1c continues to be this low, we can decrease her Metformin.  Patient is 103 pounds.  She is on a 5 foot in height.  She has been diabetic for 10-15 years.  -     metFORMIN (GLUCOPHAGE) 500 MG tablet; Take 1 tablet (500 mg total) by mouth 2 (two) times a day with meals.  -     Basic Metabolic Panel     Mixed hyperlipidemia, she is on Lipitor 20 mg daily.  LDL 56, February 27, 2020.  Enzymes normal October 28, 2020.  -     atorvastatin (LIPITOR) 20 MG tablet; Take 1 tablet (20 mg total) by mouth daily.  -     Hepatic Profile     Osteoporosis without current pathological fracture, unspecified osteoporosis type.  Patient is on alendronate weekly.  She has been on this for about 2 years.  -     alendronate (FOSAMAX) 70 MG tablet; Take 1 tablet (70 mg total) by mouth every 7 days. Take in the morning on an empty stomach with a full glass of water 30 minutes before food     Chronic midline back pain, unspecified back location  -     acetaminophen (TYLENOL EXTRA STRENGTH) 500 MG tablet; Take 2 tablets (1,000 mg total) by mouth 2 (two) times a day.     Anemia due to other cause, not classified,   -     HM1(CBC and Differential)  -     Ferritin  -     Iron and Transferrin Iron Binding  "Capacity  -     Transferrin  -     Hepatic Profile     Loss of weight, patient has been losing weight.  HbA1c is 6.6%.  Can consider cutting back on this, particularly if weight loss is ongoing.  -     Hepatic Profile     Hyperlipidemia LDL goal <100, as above.     Abnormal finding of blood chemistry, unspecified, anemia.  Hemoglobin 11, December 15, 2020.  Will check iron studies.  No abdominal pain to suggest heartburn, or dyspepsia.  -     Ferritin  -     Iron and Transferrin Iron Binding Capacity  -     Transferrin     Insomnia, patient says that she is using mirtazapine only as needed.  Patient is on aspirin 81 mg daily, she does not have a history of a stroke or a heart attack.     She has osteoarthritis involving her the knees and her back.  She uses a cane.     History of breast cancer, with left-sided lumpectomy, 10-15 years ago, followed by radiation.  Then she had a left mastectomy 8 years ago.  She was placed on a pill after this.     She has been seen by a neurologist, for tremors.  Answers for HPI/ROS submitted by the patient on 12/1/2021  PHQ9 TOTAL SCORE: 13  In general, how would you rate your overall physical health?: fair  Frequency of exercise:: None  Do you usually eat at least 4 servings of fruit and vegetables a day, include whole grains & fiber, and avoid regularly eating high fat or \"junk\" foods? : No  Taking medications regularly:: Yes  Medication side effects:: None  Activities of Daily Living: transportation requires assistance, shopping requires assistance, preparing meals requires assistance, housework requires assistance, bathing requires assistance, laundry requires assistance, medication administration requires assistance, money management requires assistance  Home safety: no safety concerns identified  Hearing Impairment:: difficulty following a conversation in a noisy restaurant or crowded room, feel that people are mumbling or not speaking clearly, need to ask people to speak up " or repeat themselves, difficulty understanding soft or whispered speech  In the past 6 months, have you been bothered by leaking of urine?: No  In general, how would you rate your overall mental or emotional health?: good  Additional concerns today:: No

## 2022-01-01 ENCOUNTER — DOCUMENTATION ONLY (OUTPATIENT)
Dept: OTHER | Facility: CLINIC | Age: 87
End: 2022-01-01
Payer: MEDICARE

## 2022-01-01 ENCOUNTER — HOSPITAL ENCOUNTER (INPATIENT)
Facility: CLINIC | Age: 87
LOS: 14 days | Discharge: HOSPICE | DRG: 557 | End: 2022-05-10
Attending: STUDENT IN AN ORGANIZED HEALTH CARE EDUCATION/TRAINING PROGRAM | Admitting: STUDENT IN AN ORGANIZED HEALTH CARE EDUCATION/TRAINING PROGRAM
Payer: MEDICARE

## 2022-01-01 ENCOUNTER — APPOINTMENT (OUTPATIENT)
Dept: SPEECH THERAPY | Facility: CLINIC | Age: 87
DRG: 557 | End: 2022-01-01
Payer: MEDICARE

## 2022-01-01 ENCOUNTER — OFFICE VISIT (OUTPATIENT)
Dept: INTERNAL MEDICINE | Facility: CLINIC | Age: 87
End: 2022-01-01
Payer: MEDICARE

## 2022-01-01 ENCOUNTER — MYC MEDICAL ADVICE (OUTPATIENT)
Dept: INTERNAL MEDICINE | Facility: CLINIC | Age: 87
End: 2022-01-01
Payer: MEDICARE

## 2022-01-01 ENCOUNTER — TELEPHONE (OUTPATIENT)
Dept: INTERNAL MEDICINE | Facility: CLINIC | Age: 87
End: 2022-01-01

## 2022-01-01 ENCOUNTER — APPOINTMENT (OUTPATIENT)
Dept: PHYSICAL THERAPY | Facility: CLINIC | Age: 87
DRG: 557 | End: 2022-01-01
Attending: INTERNAL MEDICINE
Payer: MEDICARE

## 2022-01-01 ENCOUNTER — APPOINTMENT (OUTPATIENT)
Dept: RADIOLOGY | Facility: CLINIC | Age: 87
End: 2022-01-01
Payer: MEDICARE

## 2022-01-01 ENCOUNTER — TELEPHONE (OUTPATIENT)
Dept: INTERNAL MEDICINE | Facility: CLINIC | Age: 87
End: 2022-01-01
Payer: MEDICARE

## 2022-01-01 ENCOUNTER — APPOINTMENT (OUTPATIENT)
Dept: PHYSICAL THERAPY | Facility: CLINIC | Age: 87
DRG: 557 | End: 2022-01-01
Attending: STUDENT IN AN ORGANIZED HEALTH CARE EDUCATION/TRAINING PROGRAM
Payer: MEDICARE

## 2022-01-01 ENCOUNTER — APPOINTMENT (OUTPATIENT)
Dept: SPEECH THERAPY | Facility: CLINIC | Age: 87
DRG: 557 | End: 2022-01-01
Attending: STUDENT IN AN ORGANIZED HEALTH CARE EDUCATION/TRAINING PROGRAM
Payer: MEDICARE

## 2022-01-01 ENCOUNTER — APPOINTMENT (OUTPATIENT)
Dept: CT IMAGING | Facility: CLINIC | Age: 87
End: 2022-01-01
Payer: MEDICARE

## 2022-01-01 ENCOUNTER — PATIENT OUTREACH (OUTPATIENT)
Dept: CARE COORDINATION | Facility: CLINIC | Age: 87
End: 2022-01-01
Payer: MEDICARE

## 2022-01-01 ENCOUNTER — APPOINTMENT (OUTPATIENT)
Dept: ULTRASOUND IMAGING | Facility: CLINIC | Age: 87
DRG: 557 | End: 2022-01-01
Attending: INTERNAL MEDICINE
Payer: MEDICARE

## 2022-01-01 ENCOUNTER — HOSPITAL ENCOUNTER (OUTPATIENT)
Dept: MRI IMAGING | Facility: CLINIC | Age: 87
Discharge: HOME | End: 2022-01-22
Attending: INTERNAL MEDICINE | Admitting: INTERNAL MEDICINE
Payer: MEDICARE

## 2022-01-01 ENCOUNTER — APPOINTMENT (OUTPATIENT)
Dept: OCCUPATIONAL THERAPY | Facility: CLINIC | Age: 87
DRG: 557 | End: 2022-01-01
Attending: INTERNAL MEDICINE
Payer: MEDICARE

## 2022-01-01 ENCOUNTER — PATIENT OUTREACH (OUTPATIENT)
Dept: NURSING | Facility: CLINIC | Age: 87
End: 2022-01-01
Payer: MEDICARE

## 2022-01-01 ENCOUNTER — TELEPHONE (OUTPATIENT)
Facility: CLINIC | Age: 87
End: 2022-01-01

## 2022-01-01 ENCOUNTER — APPOINTMENT (OUTPATIENT)
Dept: CT IMAGING | Facility: CLINIC | Age: 87
DRG: 557 | End: 2022-01-01
Attending: INTERNAL MEDICINE
Payer: MEDICARE

## 2022-01-01 ENCOUNTER — APPOINTMENT (OUTPATIENT)
Dept: GENERAL RADIOLOGY | Facility: CLINIC | Age: 87
DRG: 557 | End: 2022-01-01
Attending: INTERNAL MEDICINE
Payer: MEDICARE

## 2022-01-01 ENCOUNTER — HOSPITAL ENCOUNTER (EMERGENCY)
Facility: CLINIC | Age: 87
Discharge: ANOTHER HOSPITAL | End: 2022-04-26
Attending: EMERGENCY MEDICINE | Admitting: EMERGENCY MEDICINE
Payer: MEDICARE

## 2022-01-01 ENCOUNTER — OFFICE VISIT (OUTPATIENT)
Dept: FAMILY MEDICINE | Facility: CLINIC | Age: 87
End: 2022-01-01
Payer: MEDICARE

## 2022-01-01 ENCOUNTER — APPOINTMENT (OUTPATIENT)
Dept: SPEECH THERAPY | Facility: CLINIC | Age: 87
DRG: 557 | End: 2022-01-01
Attending: INTERNAL MEDICINE
Payer: MEDICARE

## 2022-01-01 ENCOUNTER — NURSING HOME VISIT (OUTPATIENT)
Dept: GERIATRICS | Facility: CLINIC | Age: 87
End: 2022-01-01
Payer: OTHER MISCELLANEOUS

## 2022-01-01 VITALS
TEMPERATURE: 98 F | DIASTOLIC BLOOD PRESSURE: 84 MMHG | HEART RATE: 116 BPM | RESPIRATION RATE: 18 BRPM | OXYGEN SATURATION: 96 % | SYSTOLIC BLOOD PRESSURE: 160 MMHG

## 2022-01-01 VITALS
HEART RATE: 76 BPM | SYSTOLIC BLOOD PRESSURE: 160 MMHG | BODY MASS INDEX: 21.01 KG/M2 | WEIGHT: 104 LBS | DIASTOLIC BLOOD PRESSURE: 72 MMHG | OXYGEN SATURATION: 97 %

## 2022-01-01 VITALS
TEMPERATURE: 98.7 F | HEIGHT: 59 IN | SYSTOLIC BLOOD PRESSURE: 201 MMHG | BODY MASS INDEX: 21.77 KG/M2 | DIASTOLIC BLOOD PRESSURE: 86 MMHG | WEIGHT: 108 LBS | HEART RATE: 102 BPM | RESPIRATION RATE: 26 BRPM | OXYGEN SATURATION: 97 %

## 2022-01-01 VITALS
OXYGEN SATURATION: 99 % | HEART RATE: 80 BPM | WEIGHT: 105.9 LBS | BODY MASS INDEX: 21.39 KG/M2 | SYSTOLIC BLOOD PRESSURE: 128 MMHG | DIASTOLIC BLOOD PRESSURE: 62 MMHG

## 2022-01-01 VITALS
HEART RATE: 99 BPM | BODY MASS INDEX: 20.84 KG/M2 | HEIGHT: 59 IN | RESPIRATION RATE: 16 BRPM | OXYGEN SATURATION: 97 % | TEMPERATURE: 98.4 F | WEIGHT: 103.4 LBS | SYSTOLIC BLOOD PRESSURE: 190 MMHG | DIASTOLIC BLOOD PRESSURE: 88 MMHG

## 2022-01-01 VITALS
DIASTOLIC BLOOD PRESSURE: 69 MMHG | WEIGHT: 108 LBS | SYSTOLIC BLOOD PRESSURE: 160 MMHG | BODY MASS INDEX: 21.81 KG/M2 | TEMPERATURE: 98.2 F | RESPIRATION RATE: 16 BRPM | HEART RATE: 79 BPM | OXYGEN SATURATION: 99 %

## 2022-01-01 VITALS
HEIGHT: 59 IN | WEIGHT: 99 LBS | DIASTOLIC BLOOD PRESSURE: 50 MMHG | TEMPERATURE: 97.5 F | SYSTOLIC BLOOD PRESSURE: 116 MMHG | OXYGEN SATURATION: 97 % | BODY MASS INDEX: 19.96 KG/M2 | HEART RATE: 84 BPM

## 2022-01-01 DIAGNOSIS — R41.0 CONFUSION: Primary | ICD-10-CM

## 2022-01-01 DIAGNOSIS — I10 ESSENTIAL HYPERTENSION: ICD-10-CM

## 2022-01-01 DIAGNOSIS — R62.7 ADULT FAILURE TO THRIVE: ICD-10-CM

## 2022-01-01 DIAGNOSIS — F03.90 DEMENTIA WITHOUT BEHAVIORAL DISTURBANCE, UNSPECIFIED DEMENTIA TYPE: ICD-10-CM

## 2022-01-01 DIAGNOSIS — Z51.5 COMFORT MEASURES ONLY STATUS: Primary | ICD-10-CM

## 2022-01-01 DIAGNOSIS — E11.9 TYPE 2 DIABETES MELLITUS WITHOUT COMPLICATION, WITHOUT LONG-TERM CURRENT USE OF INSULIN (H): ICD-10-CM

## 2022-01-01 DIAGNOSIS — F51.3: ICD-10-CM

## 2022-01-01 DIAGNOSIS — G25.0 ESSENTIAL TREMOR: ICD-10-CM

## 2022-01-01 DIAGNOSIS — F41.1 GAD (GENERALIZED ANXIETY DISORDER): ICD-10-CM

## 2022-01-01 DIAGNOSIS — M54.50 MIDLINE LOW BACK PAIN WITHOUT SCIATICA, UNSPECIFIED CHRONICITY: ICD-10-CM

## 2022-01-01 DIAGNOSIS — E11.9 TYPE 2 DIABETES MELLITUS WITHOUT COMPLICATION, UNSPECIFIED WHETHER LONG TERM INSULIN USE (H): ICD-10-CM

## 2022-01-01 DIAGNOSIS — R13.10 DYSPHAGIA, UNSPECIFIED TYPE: ICD-10-CM

## 2022-01-01 DIAGNOSIS — R41.82 ALTERED MENTAL STATUS, UNSPECIFIED ALTERED MENTAL STATUS TYPE: ICD-10-CM

## 2022-01-01 DIAGNOSIS — F03.90 DEMENTIA WITHOUT BEHAVIORAL DISTURBANCE, UNSPECIFIED DEMENTIA TYPE: Primary | ICD-10-CM

## 2022-01-01 DIAGNOSIS — R63.4 WEIGHT LOSS: ICD-10-CM

## 2022-01-01 DIAGNOSIS — M81.0 OSTEOPOROSIS WITHOUT CURRENT PATHOLOGICAL FRACTURE, UNSPECIFIED OSTEOPOROSIS TYPE: ICD-10-CM

## 2022-01-01 DIAGNOSIS — M17.0 PRIMARY OSTEOARTHRITIS OF BOTH KNEES: ICD-10-CM

## 2022-01-01 DIAGNOSIS — R62.7 ADULT FAILURE TO THRIVE: Primary | ICD-10-CM

## 2022-01-01 DIAGNOSIS — R53.81 MALAISE: ICD-10-CM

## 2022-01-01 DIAGNOSIS — Z76.89 ENCOUNTER TO ESTABLISH CARE: ICD-10-CM

## 2022-01-01 DIAGNOSIS — T79.6XXA TRAUMATIC RHABDOMYOLYSIS, INITIAL ENCOUNTER (H): ICD-10-CM

## 2022-01-01 DIAGNOSIS — F51.3: Primary | ICD-10-CM

## 2022-01-01 DIAGNOSIS — Z51.5 COMFORT MEASURES ONLY STATUS: ICD-10-CM

## 2022-01-01 LAB
ALBUMIN SERPL-MCNC: 4 G/DL (ref 3.4–5)
ALBUMIN SERPL-MCNC: 4.7 G/DL (ref 3.5–5)
ALBUMIN UR-MCNC: 100 MG/DL
ALBUMIN UR-MCNC: 200 MG/DL
ALP SERPL-CCNC: 59 U/L (ref 45–120)
ALP SERPL-CCNC: 67 U/L (ref 40–150)
ALT SERPL W P-5'-P-CCNC: 15 U/L (ref 0–45)
ALT SERPL W P-5'-P-CCNC: 40 U/L (ref 0–50)
AMMONIA PLAS-SCNC: 15 UMOL/L (ref 10–50)
ANION GAP SERPL CALCULATED.3IONS-SCNC: 13 MMOL/L (ref 5–18)
ANION GAP SERPL CALCULATED.3IONS-SCNC: 18 MMOL/L (ref 5–18)
ANION GAP SERPL CALCULATED.3IONS-SCNC: 3 MMOL/L (ref 3–14)
ANION GAP SERPL CALCULATED.3IONS-SCNC: 5 MMOL/L (ref 3–14)
ANION GAP SERPL CALCULATED.3IONS-SCNC: 5 MMOL/L (ref 3–14)
ANION GAP SERPL CALCULATED.3IONS-SCNC: 6 MMOL/L (ref 3–14)
ANION GAP SERPL CALCULATED.3IONS-SCNC: 6 MMOL/L (ref 3–14)
ANION GAP SERPL CALCULATED.3IONS-SCNC: 7 MMOL/L (ref 3–14)
ANION GAP SERPL CALCULATED.3IONS-SCNC: 8 MMOL/L (ref 3–14)
ANION GAP SERPL CALCULATED.3IONS-SCNC: 8 MMOL/L (ref 3–14)
ANION GAP SERPL CALCULATED.3IONS-SCNC: 9 MMOL/L (ref 3–14)
ANION GAP SERPL CALCULATED.3IONS-SCNC: 9 MMOL/L (ref 3–14)
APPEARANCE UR: CLEAR
APPEARANCE UR: CLEAR
AST SERPL W P-5'-P-CCNC: 22 U/L (ref 0–40)
AST SERPL W P-5'-P-CCNC: 84 U/L (ref 0–45)
ATRIAL RATE - MUSE: 137 BPM
ATRIAL RATE - MUSE: 147 BPM
ATRIAL RATE - MUSE: 99 BPM
BACTERIA #/AREA URNS HPF: ABNORMAL /HPF
BACTERIA BLD CULT: NO GROWTH
BACTERIA UR CULT: NORMAL
BASOPHILS # BLD AUTO: 0.1 10E3/UL (ref 0–0.2)
BASOPHILS # BLD MANUAL: 0 10E3/UL (ref 0–0.2)
BASOPHILS NFR BLD AUTO: 0 %
BASOPHILS NFR BLD MANUAL: 0 %
BILIRUB DIRECT SERPL-MCNC: 0.2 MG/DL (ref 0–0.2)
BILIRUB DIRECT SERPL-MCNC: 0.3 MG/DL
BILIRUB SERPL-MCNC: 0.7 MG/DL (ref 0.2–1.3)
BILIRUB SERPL-MCNC: 0.7 MG/DL (ref 0–1)
BILIRUB UR QL STRIP: NEGATIVE
BILIRUB UR QL STRIP: NEGATIVE
BUN SERPL-MCNC: 10 MG/DL (ref 7–30)
BUN SERPL-MCNC: 17 MG/DL (ref 8–28)
BUN SERPL-MCNC: 17 MG/DL (ref 8–28)
BUN SERPL-MCNC: 5 MG/DL (ref 7–30)
BUN SERPL-MCNC: 6 MG/DL (ref 7–30)
BUN SERPL-MCNC: 7 MG/DL (ref 7–30)
BUN SERPL-MCNC: 9 MG/DL (ref 7–30)
BUN SERPL-MCNC: 9 MG/DL (ref 7–30)
C COLI+JEJUNI+LARI FUSA STL QL NAA+PROBE: NOT DETECTED
C DIFF TOX B STL QL: NEGATIVE
CALCIUM SERPL-MCNC: 7.8 MG/DL (ref 8.5–10.1)
CALCIUM SERPL-MCNC: 7.8 MG/DL (ref 8.5–10.1)
CALCIUM SERPL-MCNC: 8 MG/DL (ref 8.5–10.1)
CALCIUM SERPL-MCNC: 8 MG/DL (ref 8.5–10.1)
CALCIUM SERPL-MCNC: 8.1 MG/DL (ref 8.5–10.1)
CALCIUM SERPL-MCNC: 8.2 MG/DL (ref 8.5–10.1)
CALCIUM SERPL-MCNC: 8.3 MG/DL (ref 8.5–10.1)
CALCIUM SERPL-MCNC: 8.3 MG/DL (ref 8.5–10.1)
CALCIUM SERPL-MCNC: 8.4 MG/DL (ref 8.5–10.1)
CALCIUM SERPL-MCNC: 8.5 MG/DL (ref 8.5–10.1)
CALCIUM SERPL-MCNC: 9.1 MG/DL (ref 8.5–10.5)
CALCIUM SERPL-MCNC: 9.6 MG/DL (ref 8.5–10.5)
CHLORIDE BLD-SCNC: 101 MMOL/L (ref 98–107)
CHLORIDE BLD-SCNC: 102 MMOL/L (ref 94–109)
CHLORIDE BLD-SCNC: 103 MMOL/L (ref 94–109)
CHLORIDE BLD-SCNC: 106 MMOL/L (ref 94–109)
CHLORIDE BLD-SCNC: 107 MMOL/L (ref 94–109)
CHLORIDE BLD-SCNC: 109 MMOL/L (ref 94–109)
CHLORIDE BLD-SCNC: 113 MMOL/L (ref 94–109)
CHLORIDE BLD-SCNC: 114 MMOL/L (ref 94–109)
CHLORIDE BLD-SCNC: 120 MMOL/L (ref 94–109)
CHLORIDE BLD-SCNC: 95 MMOL/L (ref 94–109)
CHLORIDE BLD-SCNC: 98 MMOL/L (ref 94–109)
CHLORIDE BLD-SCNC: 98 MMOL/L (ref 98–107)
CK SERPL-CCNC: 115 U/L (ref 30–225)
CK SERPL-CCNC: 1575 U/L (ref 30–225)
CK SERPL-CCNC: 1828 U/L (ref 30–190)
CK SERPL-CCNC: 2233 U/L (ref 30–225)
CO2 SERPL-SCNC: 20 MMOL/L (ref 22–31)
CO2 SERPL-SCNC: 20 MMOL/L (ref 22–31)
CO2 SERPL-SCNC: 21 MMOL/L (ref 20–32)
CO2 SERPL-SCNC: 22 MMOL/L (ref 20–32)
CO2 SERPL-SCNC: 23 MMOL/L (ref 20–32)
CO2 SERPL-SCNC: 24 MMOL/L (ref 20–32)
CO2 SERPL-SCNC: 24 MMOL/L (ref 20–32)
COLOR UR AUTO: ABNORMAL
COLOR UR AUTO: YELLOW
CREAT SERPL-MCNC: 0.7 MG/DL (ref 0.52–1.04)
CREAT SERPL-MCNC: 0.75 MG/DL (ref 0.52–1.04)
CREAT SERPL-MCNC: 0.77 MG/DL (ref 0.52–1.04)
CREAT SERPL-MCNC: 0.86 MG/DL (ref 0.6–1.1)
CREAT SERPL-MCNC: 0.89 MG/DL (ref 0.52–1.04)
CREAT SERPL-MCNC: 0.9 MG/DL (ref 0.52–1.04)
CREAT SERPL-MCNC: 0.96 MG/DL (ref 0.52–1.04)
CREAT SERPL-MCNC: 0.96 MG/DL (ref 0.6–1.1)
CREAT SERPL-MCNC: 1.04 MG/DL (ref 0.52–1.04)
CREAT SERPL-MCNC: 1.06 MG/DL (ref 0.52–1.04)
CREAT SERPL-MCNC: 1.06 MG/DL (ref 0.52–1.04)
CREAT SERPL-MCNC: 1.11 MG/DL (ref 0.52–1.04)
DIASTOLIC BLOOD PRESSURE - MUSE: NORMAL MMHG
EC STX1 GENE STL QL NAA+PROBE: NOT DETECTED
EC STX2 GENE STL QL NAA+PROBE: NOT DETECTED
EOSINOPHIL # BLD AUTO: 0.1 10E3/UL (ref 0–0.7)
EOSINOPHIL # BLD MANUAL: 0 10E3/UL (ref 0–0.7)
EOSINOPHIL NFR BLD AUTO: 1 %
EOSINOPHIL NFR BLD MANUAL: 0 %
ERYTHROCYTE [DISTWIDTH] IN BLOOD BY AUTOMATED COUNT: 11 % (ref 10–15)
ERYTHROCYTE [DISTWIDTH] IN BLOOD BY AUTOMATED COUNT: 11.7 % (ref 10–15)
ERYTHROCYTE [DISTWIDTH] IN BLOOD BY AUTOMATED COUNT: 11.8 % (ref 10–15)
ERYTHROCYTE [DISTWIDTH] IN BLOOD BY AUTOMATED COUNT: 11.8 % (ref 10–15)
ERYTHROCYTE [DISTWIDTH] IN BLOOD BY AUTOMATED COUNT: 11.9 % (ref 10–15)
ERYTHROCYTE [DISTWIDTH] IN BLOOD BY AUTOMATED COUNT: 12.1 % (ref 10–15)
GFR SERPL CREATININE-BSD FRML MDRD: 49 ML/MIN/1.73M2
GFR SERPL CREATININE-BSD FRML MDRD: 52 ML/MIN/1.73M2
GFR SERPL CREATININE-BSD FRML MDRD: 52 ML/MIN/1.73M2
GFR SERPL CREATININE-BSD FRML MDRD: 53 ML/MIN/1.73M2
GFR SERPL CREATININE-BSD FRML MDRD: 58 ML/MIN/1.73M2
GFR SERPL CREATININE-BSD FRML MDRD: 58 ML/MIN/1.73M2
GFR SERPL CREATININE-BSD FRML MDRD: 63 ML/MIN/1.73M2
GFR SERPL CREATININE-BSD FRML MDRD: 64 ML/MIN/1.73M2
GFR SERPL CREATININE-BSD FRML MDRD: 66 ML/MIN/1.73M2
GFR SERPL CREATININE-BSD FRML MDRD: 76 ML/MIN/1.73M2
GFR SERPL CREATININE-BSD FRML MDRD: 78 ML/MIN/1.73M2
GFR SERPL CREATININE-BSD FRML MDRD: 85 ML/MIN/1.73M2
GLUCOSE BLD-MCNC: 101 MG/DL (ref 70–99)
GLUCOSE BLD-MCNC: 110 MG/DL (ref 70–125)
GLUCOSE BLD-MCNC: 111 MG/DL (ref 70–99)
GLUCOSE BLD-MCNC: 113 MG/DL (ref 70–99)
GLUCOSE BLD-MCNC: 136 MG/DL (ref 70–99)
GLUCOSE BLD-MCNC: 141 MG/DL (ref 70–99)
GLUCOSE BLD-MCNC: 150 MG/DL (ref 70–99)
GLUCOSE BLD-MCNC: 159 MG/DL (ref 70–99)
GLUCOSE BLD-MCNC: 164 MG/DL (ref 70–99)
GLUCOSE BLD-MCNC: 170 MG/DL (ref 70–99)
GLUCOSE BLD-MCNC: 275 MG/DL (ref 70–125)
GLUCOSE BLD-MCNC: 97 MG/DL (ref 70–99)
GLUCOSE BLDC GLUCOMTR-MCNC: 100 MG/DL (ref 70–99)
GLUCOSE BLDC GLUCOMTR-MCNC: 101 MG/DL (ref 70–99)
GLUCOSE BLDC GLUCOMTR-MCNC: 102 MG/DL (ref 70–99)
GLUCOSE BLDC GLUCOMTR-MCNC: 102 MG/DL (ref 70–99)
GLUCOSE BLDC GLUCOMTR-MCNC: 103 MG/DL (ref 70–99)
GLUCOSE BLDC GLUCOMTR-MCNC: 106 MG/DL (ref 70–99)
GLUCOSE BLDC GLUCOMTR-MCNC: 110 MG/DL (ref 70–99)
GLUCOSE BLDC GLUCOMTR-MCNC: 111 MG/DL (ref 70–99)
GLUCOSE BLDC GLUCOMTR-MCNC: 113 MG/DL (ref 70–99)
GLUCOSE BLDC GLUCOMTR-MCNC: 114 MG/DL (ref 70–99)
GLUCOSE BLDC GLUCOMTR-MCNC: 116 MG/DL (ref 70–99)
GLUCOSE BLDC GLUCOMTR-MCNC: 118 MG/DL (ref 70–99)
GLUCOSE BLDC GLUCOMTR-MCNC: 121 MG/DL (ref 70–99)
GLUCOSE BLDC GLUCOMTR-MCNC: 122 MG/DL (ref 70–99)
GLUCOSE BLDC GLUCOMTR-MCNC: 127 MG/DL (ref 70–99)
GLUCOSE BLDC GLUCOMTR-MCNC: 130 MG/DL (ref 70–99)
GLUCOSE BLDC GLUCOMTR-MCNC: 130 MG/DL (ref 70–99)
GLUCOSE BLDC GLUCOMTR-MCNC: 132 MG/DL (ref 70–99)
GLUCOSE BLDC GLUCOMTR-MCNC: 133 MG/DL (ref 70–99)
GLUCOSE BLDC GLUCOMTR-MCNC: 135 MG/DL (ref 70–99)
GLUCOSE BLDC GLUCOMTR-MCNC: 138 MG/DL (ref 70–99)
GLUCOSE BLDC GLUCOMTR-MCNC: 139 MG/DL (ref 70–99)
GLUCOSE BLDC GLUCOMTR-MCNC: 140 MG/DL (ref 70–99)
GLUCOSE BLDC GLUCOMTR-MCNC: 140 MG/DL (ref 70–99)
GLUCOSE BLDC GLUCOMTR-MCNC: 142 MG/DL (ref 70–99)
GLUCOSE BLDC GLUCOMTR-MCNC: 143 MG/DL (ref 70–99)
GLUCOSE BLDC GLUCOMTR-MCNC: 145 MG/DL (ref 70–99)
GLUCOSE BLDC GLUCOMTR-MCNC: 146 MG/DL (ref 70–99)
GLUCOSE BLDC GLUCOMTR-MCNC: 147 MG/DL (ref 70–99)
GLUCOSE BLDC GLUCOMTR-MCNC: 148 MG/DL (ref 70–99)
GLUCOSE BLDC GLUCOMTR-MCNC: 150 MG/DL (ref 70–99)
GLUCOSE BLDC GLUCOMTR-MCNC: 154 MG/DL (ref 70–99)
GLUCOSE BLDC GLUCOMTR-MCNC: 154 MG/DL (ref 70–99)
GLUCOSE BLDC GLUCOMTR-MCNC: 160 MG/DL (ref 70–99)
GLUCOSE BLDC GLUCOMTR-MCNC: 160 MG/DL (ref 70–99)
GLUCOSE BLDC GLUCOMTR-MCNC: 161 MG/DL (ref 70–99)
GLUCOSE BLDC GLUCOMTR-MCNC: 163 MG/DL (ref 70–99)
GLUCOSE BLDC GLUCOMTR-MCNC: 167 MG/DL (ref 70–99)
GLUCOSE BLDC GLUCOMTR-MCNC: 168 MG/DL (ref 70–99)
GLUCOSE BLDC GLUCOMTR-MCNC: 169 MG/DL (ref 70–99)
GLUCOSE BLDC GLUCOMTR-MCNC: 169 MG/DL (ref 70–99)
GLUCOSE BLDC GLUCOMTR-MCNC: 170 MG/DL (ref 70–99)
GLUCOSE BLDC GLUCOMTR-MCNC: 171 MG/DL (ref 70–99)
GLUCOSE BLDC GLUCOMTR-MCNC: 178 MG/DL (ref 70–99)
GLUCOSE BLDC GLUCOMTR-MCNC: 190 MG/DL (ref 70–99)
GLUCOSE BLDC GLUCOMTR-MCNC: 191 MG/DL (ref 70–99)
GLUCOSE BLDC GLUCOMTR-MCNC: 202 MG/DL (ref 70–99)
GLUCOSE BLDC GLUCOMTR-MCNC: 231 MG/DL (ref 70–99)
GLUCOSE BLDC GLUCOMTR-MCNC: 275 MG/DL (ref 70–99)
GLUCOSE BLDC GLUCOMTR-MCNC: 70 MG/DL (ref 70–99)
GLUCOSE BLDC GLUCOMTR-MCNC: 97 MG/DL (ref 70–99)
GLUCOSE UR STRIP-MCNC: 500 MG/DL
GLUCOSE UR STRIP-MCNC: NEGATIVE MG/DL
HCT VFR BLD AUTO: 28.9 % (ref 35–47)
HCT VFR BLD AUTO: 29 % (ref 35–47)
HCT VFR BLD AUTO: 29.8 % (ref 35–47)
HCT VFR BLD AUTO: 31.2 % (ref 35–47)
HCT VFR BLD AUTO: 31.7 % (ref 35–47)
HCT VFR BLD AUTO: 31.9 % (ref 35–47)
HCT VFR BLD AUTO: 34.7 % (ref 35–47)
HCT VFR BLD AUTO: 36.5 % (ref 35–47)
HCT VFR BLD AUTO: 36.5 % (ref 35–47)
HGB BLD-MCNC: 10.2 G/DL (ref 11.7–15.7)
HGB BLD-MCNC: 10.7 G/DL (ref 11.7–15.7)
HGB BLD-MCNC: 12 G/DL (ref 11.7–15.7)
HGB BLD-MCNC: 12.1 G/DL (ref 11.7–15.7)
HGB BLD-MCNC: 12.4 G/DL (ref 11.7–15.7)
HGB BLD-MCNC: 9.2 G/DL (ref 11.7–15.7)
HGB BLD-MCNC: 9.5 G/DL (ref 11.7–15.7)
HGB BLD-MCNC: 9.8 G/DL (ref 11.7–15.7)
HGB BLD-MCNC: 9.8 G/DL (ref 11.7–15.7)
HGB UR QL STRIP: ABNORMAL
HGB UR QL STRIP: ABNORMAL
HOLD SPECIMEN: NORMAL
IMM GRANULOCYTES # BLD: 0 10E3/UL
IMM GRANULOCYTES NFR BLD: 0 %
INTERPRETATION ECG - MUSE: NORMAL
KETONES UR STRIP-MCNC: 60 MG/DL
KETONES UR STRIP-MCNC: ABNORMAL MG/DL
LACTATE SERPL-SCNC: 1.2 MMOL/L (ref 0.7–2)
LACTATE SERPL-SCNC: 1.3 MMOL/L (ref 0.7–2)
LACTATE SERPL-SCNC: 1.3 MMOL/L (ref 0.7–2)
LACTATE SERPL-SCNC: 1.8 MMOL/L (ref 0.7–2)
LACTATE SERPL-SCNC: 2.3 MMOL/L (ref 0.7–2)
LEUKOCYTE ESTERASE UR QL STRIP: NEGATIVE
LEUKOCYTE ESTERASE UR QL STRIP: NEGATIVE
LYMPHOCYTES # BLD AUTO: 2.5 10E3/UL (ref 0.8–5.3)
LYMPHOCYTES # BLD MANUAL: 0.7 10E3/UL (ref 0.8–5.3)
LYMPHOCYTES NFR BLD AUTO: 21 %
LYMPHOCYTES NFR BLD MANUAL: 2 %
MAGNESIUM SERPL-MCNC: 1.2 MG/DL (ref 1.8–2.6)
MAGNESIUM SERPL-MCNC: 1.3 MG/DL (ref 1.6–2.3)
MAGNESIUM SERPL-MCNC: 1.5 MG/DL (ref 1.6–2.3)
MAGNESIUM SERPL-MCNC: 1.5 MG/DL (ref 1.6–2.3)
MAGNESIUM SERPL-MCNC: 1.6 MG/DL (ref 1.6–2.3)
MAGNESIUM SERPL-MCNC: 1.8 MG/DL (ref 1.6–2.3)
MAGNESIUM SERPL-MCNC: 1.8 MG/DL (ref 1.6–2.3)
MAGNESIUM SERPL-MCNC: 1.9 MG/DL (ref 1.6–2.3)
MAGNESIUM SERPL-MCNC: 1.9 MG/DL (ref 1.6–2.3)
MAGNESIUM SERPL-MCNC: 2 MG/DL (ref 1.6–2.3)
MAGNESIUM SERPL-MCNC: 2.1 MG/DL (ref 1.6–2.3)
MAGNESIUM SERPL-MCNC: 2.2 MG/DL (ref 1.6–2.3)
MCH RBC QN AUTO: 33.8 PG (ref 26.5–33)
MCH RBC QN AUTO: 34 PG (ref 26.5–33)
MCH RBC QN AUTO: 34.1 PG (ref 26.5–33)
MCH RBC QN AUTO: 34.3 PG (ref 26.5–33)
MCH RBC QN AUTO: 34.3 PG (ref 26.5–33)
MCH RBC QN AUTO: 34.4 PG (ref 26.5–33)
MCH RBC QN AUTO: 34.6 PG (ref 26.5–33)
MCH RBC QN AUTO: 34.7 PG (ref 26.5–33)
MCH RBC QN AUTO: 35.1 PG (ref 26.5–33)
MCHC RBC AUTO-ENTMCNC: 30.9 G/DL (ref 31.5–36.5)
MCHC RBC AUTO-ENTMCNC: 30.9 G/DL (ref 31.5–36.5)
MCHC RBC AUTO-ENTMCNC: 32.7 G/DL (ref 31.5–36.5)
MCHC RBC AUTO-ENTMCNC: 32.8 G/DL (ref 31.5–36.5)
MCHC RBC AUTO-ENTMCNC: 33.2 G/DL (ref 31.5–36.5)
MCHC RBC AUTO-ENTMCNC: 33.5 G/DL (ref 31.5–36.5)
MCHC RBC AUTO-ENTMCNC: 33.9 G/DL (ref 31.5–36.5)
MCHC RBC AUTO-ENTMCNC: 34 G/DL (ref 31.5–36.5)
MCHC RBC AUTO-ENTMCNC: 34.6 G/DL (ref 31.5–36.5)
MCV RBC AUTO: 100 FL (ref 78–100)
MCV RBC AUTO: 100 FL (ref 78–100)
MCV RBC AUTO: 102 FL (ref 78–100)
MCV RBC AUTO: 102 FL (ref 78–100)
MCV RBC AUTO: 105 FL (ref 78–100)
MCV RBC AUTO: 105 FL (ref 78–100)
MCV RBC AUTO: 106 FL (ref 78–100)
MCV RBC AUTO: 110 FL (ref 78–100)
MCV RBC AUTO: 110 FL (ref 78–100)
MONOCYTES # BLD AUTO: 1.8 10E3/UL (ref 0–1.3)
MONOCYTES # BLD MANUAL: 3.1 10E3/UL (ref 0–1.3)
MONOCYTES NFR BLD AUTO: 15 %
MONOCYTES NFR BLD MANUAL: 9 %
MUCOUS THREADS #/AREA URNS LPF: PRESENT /LPF
NEUTROPHILS # BLD AUTO: 7.6 10E3/UL (ref 1.6–8.3)
NEUTROPHILS # BLD MANUAL: 30.6 10E3/UL (ref 1.6–8.3)
NEUTROPHILS NFR BLD AUTO: 63 %
NEUTROPHILS NFR BLD MANUAL: 89 %
NITRATE UR QL: NEGATIVE
NITRATE UR QL: NEGATIVE
NOROV GI+II ORF1-ORF2 JNC STL QL NAA+PR: NOT DETECTED
P AXIS - MUSE: 2 DEGREES
P AXIS - MUSE: 51 DEGREES
P AXIS - MUSE: 52 DEGREES
PH UR STRIP: 5.5 [PH] (ref 5–8)
PH UR STRIP: 6 [PH] (ref 5–7)
PLAT MORPH BLD: ABNORMAL
PLATELET # BLD AUTO: 217 10E3/UL (ref 150–450)
PLATELET # BLD AUTO: 227 10E3/UL (ref 150–450)
PLATELET # BLD AUTO: 233 10E3/UL (ref 150–450)
PLATELET # BLD AUTO: 235 10E3/UL (ref 150–450)
PLATELET # BLD AUTO: 281 10E3/UL (ref 150–450)
PLATELET # BLD AUTO: 302 10E3/UL (ref 150–450)
PLATELET # BLD AUTO: 331 10E3/UL (ref 150–450)
PLATELET # BLD AUTO: 333 10E3/UL (ref 150–450)
PLATELET # BLD AUTO: 366 10E3/UL (ref 150–450)
PLATELET # BLD AUTO: 391 10E3/UL (ref 150–450)
PLATELET # BLD AUTO: 391 10E3/UL (ref 150–450)
POTASSIUM BLD-SCNC: 3.1 MMOL/L (ref 3.4–5.3)
POTASSIUM BLD-SCNC: 3.3 MMOL/L (ref 3.4–5.3)
POTASSIUM BLD-SCNC: 3.3 MMOL/L (ref 3.4–5.3)
POTASSIUM BLD-SCNC: 3.4 MMOL/L (ref 3.4–5.3)
POTASSIUM BLD-SCNC: 3.5 MMOL/L (ref 3.4–5.3)
POTASSIUM BLD-SCNC: 3.6 MMOL/L (ref 3.4–5.3)
POTASSIUM BLD-SCNC: 3.7 MMOL/L (ref 3.4–5.3)
POTASSIUM BLD-SCNC: 3.8 MMOL/L (ref 3.4–5.3)
POTASSIUM BLD-SCNC: 4 MMOL/L (ref 3.5–5)
POTASSIUM BLD-SCNC: 4.4 MMOL/L (ref 3.4–5.3)
POTASSIUM BLD-SCNC: 4.8 MMOL/L (ref 3.5–5)
PR INTERVAL - MUSE: 116 MS
PR INTERVAL - MUSE: 118 MS
PR INTERVAL - MUSE: 140 MS
PROCALCITONIN SERPL-MCNC: 0.31 NG/ML
PROT SERPL-MCNC: 7.7 G/DL (ref 6.8–8.8)
PROT SERPL-MCNC: 8 G/DL (ref 6–8)
QRS DURATION - MUSE: 72 MS
QRS DURATION - MUSE: 74 MS
QRS DURATION - MUSE: 76 MS
QT - MUSE: 270 MS
QT - MUSE: 274 MS
QT - MUSE: 360 MS
QTC - MUSE: 407 MS
QTC - MUSE: 428 MS
QTC - MUSE: 462 MS
R AXIS - MUSE: 77 DEGREES
R AXIS - MUSE: 79 DEGREES
R AXIS - MUSE: 87 DEGREES
RADIOLOGIST FLAGS: ABNORMAL
RBC # BLD AUTO: 2.7 10E6/UL (ref 3.8–5.2)
RBC # BLD AUTO: 2.76 10E6/UL (ref 3.8–5.2)
RBC # BLD AUTO: 2.88 10E6/UL (ref 3.8–5.2)
RBC # BLD AUTO: 2.9 10E6/UL (ref 3.8–5.2)
RBC # BLD AUTO: 2.97 10E6/UL (ref 3.8–5.2)
RBC # BLD AUTO: 3.12 10E6/UL (ref 3.8–5.2)
RBC # BLD AUTO: 3.45 10E6/UL (ref 3.8–5.2)
RBC # BLD AUTO: 3.46 10E6/UL (ref 3.8–5.2)
RBC # BLD AUTO: 3.58 10E6/UL (ref 3.8–5.2)
RBC MORPH BLD: ABNORMAL
RBC URINE: 1 /HPF
RVA NSP5 STL QL NAA+PROBE: NOT DETECTED
SALMONELLA SP RPOD STL QL NAA+PROBE: NOT DETECTED
SARS-COV-2 RNA RESP QL NAA+PROBE: NEGATIVE
SHIGELLA SP+EIEC IPAH STL QL NAA+PROBE: NOT DETECTED
SODIUM SERPL-SCNC: 124 MMOL/L (ref 133–144)
SODIUM SERPL-SCNC: 127 MMOL/L (ref 133–144)
SODIUM SERPL-SCNC: 128 MMOL/L (ref 133–144)
SODIUM SERPL-SCNC: 129 MMOL/L (ref 133–144)
SODIUM SERPL-SCNC: 133 MMOL/L (ref 133–144)
SODIUM SERPL-SCNC: 134 MMOL/L (ref 136–145)
SODIUM SERPL-SCNC: 135 MMOL/L (ref 133–144)
SODIUM SERPL-SCNC: 136 MMOL/L (ref 133–144)
SODIUM SERPL-SCNC: 136 MMOL/L (ref 136–145)
SODIUM SERPL-SCNC: 139 MMOL/L (ref 133–144)
SODIUM SERPL-SCNC: 141 MMOL/L (ref 133–144)
SODIUM SERPL-SCNC: 143 MMOL/L (ref 133–144)
SODIUM SERPL-SCNC: 146 MMOL/L (ref 133–144)
SP GR UR STRIP: 1.01 (ref 1–1.03)
SP GR UR STRIP: 1.02 (ref 1–1.03)
SYSTOLIC BLOOD PRESSURE - MUSE: NORMAL MMHG
T AXIS - MUSE: -10 DEGREES
T AXIS - MUSE: 71 DEGREES
T AXIS - MUSE: 78 DEGREES
TROPONIN I SERPL-MCNC: 0.07 NG/ML (ref 0–0.29)
TSH SERPL DL<=0.005 MIU/L-ACNC: 0.47 MU/L (ref 0.4–4)
TSH SERPL DL<=0.005 MIU/L-ACNC: 1.34 UIU/ML (ref 0.3–5)
UROBILINOGEN UR STRIP-ACNC: 0.2 E.U./DL
UROBILINOGEN UR STRIP-MCNC: <2 MG/DL
V CHOL+PARA RFBL+TRKH+TNAA STL QL NAA+PR: NOT DETECTED
VANCOMYCIN SERPL-MCNC: 12.9 MG/L
VENTRICULAR RATE- MUSE: 137 BPM
VENTRICULAR RATE- MUSE: 147 BPM
VENTRICULAR RATE- MUSE: 99 BPM
WBC # BLD AUTO: 12.1 10E3/UL (ref 4–11)
WBC # BLD AUTO: 12.5 10E3/UL (ref 4–11)
WBC # BLD AUTO: 13.6 10E3/UL (ref 4–11)
WBC # BLD AUTO: 19.1 10E3/UL (ref 4–11)
WBC # BLD AUTO: 19.8 10E3/UL (ref 4–11)
WBC # BLD AUTO: 28.3 10E3/UL (ref 4–11)
WBC # BLD AUTO: 29.2 10E3/UL (ref 4–11)
WBC # BLD AUTO: 34.4 10E3/UL (ref 4–11)
WBC # BLD AUTO: 9.8 10E3/UL (ref 4–11)
WBC URINE: 6 /HPF
Y ENTERO RECN STL QL NAA+PROBE: NOT DETECTED

## 2022-01-01 PROCEDURE — 80048 BASIC METABOLIC PNL TOTAL CA: CPT | Performed by: STUDENT IN AN ORGANIZED HEALTH CARE EDUCATION/TRAINING PROGRAM

## 2022-01-01 PROCEDURE — 36415 COLL VENOUS BLD VENIPUNCTURE: CPT | Performed by: STUDENT IN AN ORGANIZED HEALTH CARE EDUCATION/TRAINING PROGRAM

## 2022-01-01 PROCEDURE — 120N000001 HC R&B MED SURG/OB

## 2022-01-01 PROCEDURE — 250N000011 HC RX IP 250 OP 636: Performed by: STUDENT IN AN ORGANIZED HEALTH CARE EDUCATION/TRAINING PROGRAM

## 2022-01-01 PROCEDURE — 83735 ASSAY OF MAGNESIUM: CPT | Performed by: INTERNAL MEDICINE

## 2022-01-01 PROCEDURE — 85025 COMPLETE CBC W/AUTO DIFF WBC: CPT | Performed by: FAMILY MEDICINE

## 2022-01-01 PROCEDURE — 80048 BASIC METABOLIC PNL TOTAL CA: CPT | Performed by: INTERNAL MEDICINE

## 2022-01-01 PROCEDURE — 99232 SBSQ HOSP IP/OBS MODERATE 35: CPT | Performed by: INTERNAL MEDICINE

## 2022-01-01 PROCEDURE — 99233 SBSQ HOSP IP/OBS HIGH 50: CPT | Performed by: INTERNAL MEDICINE

## 2022-01-01 PROCEDURE — 96361 HYDRATE IV INFUSION ADD-ON: CPT

## 2022-01-01 PROCEDURE — 82310 ASSAY OF CALCIUM: CPT | Performed by: INTERNAL MEDICINE

## 2022-01-01 PROCEDURE — 96365 THER/PROPH/DIAG IV INF INIT: CPT

## 2022-01-01 PROCEDURE — 258N000003 HC RX IP 258 OP 636: Performed by: STUDENT IN AN ORGANIZED HEALTH CARE EDUCATION/TRAINING PROGRAM

## 2022-01-01 PROCEDURE — 250N000011 HC RX IP 250 OP 636: Performed by: PHYSICIAN ASSISTANT

## 2022-01-01 PROCEDURE — 92526 ORAL FUNCTION THERAPY: CPT | Mod: GN | Performed by: SPEECH-LANGUAGE PATHOLOGIST

## 2022-01-01 PROCEDURE — 36415 COLL VENOUS BLD VENIPUNCTURE: CPT | Performed by: INTERNAL MEDICINE

## 2022-01-01 PROCEDURE — 36415 COLL VENOUS BLD VENIPUNCTURE: CPT | Performed by: FAMILY MEDICINE

## 2022-01-01 PROCEDURE — 80048 BASIC METABOLIC PNL TOTAL CA: CPT | Performed by: FAMILY MEDICINE

## 2022-01-01 PROCEDURE — 85027 COMPLETE CBC AUTOMATED: CPT | Performed by: INTERNAL MEDICINE

## 2022-01-01 PROCEDURE — 258N000003 HC RX IP 258 OP 636: Performed by: INTERNAL MEDICINE

## 2022-01-01 PROCEDURE — 250N000013 HC RX MED GY IP 250 OP 250 PS 637: Performed by: STUDENT IN AN ORGANIZED HEALTH CARE EDUCATION/TRAINING PROGRAM

## 2022-01-01 PROCEDURE — 250N000011 HC RX IP 250 OP 636: Performed by: INTERNAL MEDICINE

## 2022-01-01 PROCEDURE — 97530 THERAPEUTIC ACTIVITIES: CPT | Mod: GP | Performed by: PHYSICAL THERAPIST

## 2022-01-01 PROCEDURE — 99233 SBSQ HOSP IP/OBS HIGH 50: CPT | Performed by: NURSE PRACTITIONER

## 2022-01-01 PROCEDURE — 250N000013 HC RX MED GY IP 250 OP 250 PS 637: Performed by: INTERNAL MEDICINE

## 2022-01-01 PROCEDURE — 93005 ELECTROCARDIOGRAM TRACING: CPT | Performed by: EMERGENCY MEDICINE

## 2022-01-01 PROCEDURE — 87040 BLOOD CULTURE FOR BACTERIA: CPT | Performed by: STUDENT IN AN ORGANIZED HEALTH CARE EDUCATION/TRAINING PROGRAM

## 2022-01-01 PROCEDURE — 97602 WOUND(S) CARE NON-SELECTIVE: CPT

## 2022-01-01 PROCEDURE — 93922 UPR/L XTREMITY ART 2 LEVELS: CPT

## 2022-01-01 PROCEDURE — 99214 OFFICE O/P EST MOD 30 MIN: CPT | Performed by: INTERNAL MEDICINE

## 2022-01-01 PROCEDURE — 97166 OT EVAL MOD COMPLEX 45 MIN: CPT | Mod: GO | Performed by: OCCUPATIONAL THERAPIST

## 2022-01-01 PROCEDURE — 84443 ASSAY THYROID STIM HORMONE: CPT | Performed by: INTERNAL MEDICINE

## 2022-01-01 PROCEDURE — 87086 URINE CULTURE/COLONY COUNT: CPT | Performed by: FAMILY MEDICINE

## 2022-01-01 PROCEDURE — 83735 ASSAY OF MAGNESIUM: CPT | Performed by: STUDENT IN AN ORGANIZED HEALTH CARE EDUCATION/TRAINING PROGRAM

## 2022-01-01 PROCEDURE — 250N000009 HC RX 250: Performed by: INTERNAL MEDICINE

## 2022-01-01 PROCEDURE — 93010 ELECTROCARDIOGRAM REPORT: CPT | Performed by: INTERNAL MEDICINE

## 2022-01-01 PROCEDURE — 99223 1ST HOSP IP/OBS HIGH 75: CPT | Performed by: NURSE PRACTITIONER

## 2022-01-01 PROCEDURE — 84484 ASSAY OF TROPONIN QUANT: CPT | Performed by: PHYSICIAN ASSISTANT

## 2022-01-01 PROCEDURE — 70450 CT HEAD/BRAIN W/O DYE: CPT

## 2022-01-01 PROCEDURE — 87506 IADNA-DNA/RNA PROBE TQ 6-11: CPT | Performed by: INTERNAL MEDICINE

## 2022-01-01 PROCEDURE — 96366 THER/PROPH/DIAG IV INF ADDON: CPT

## 2022-01-01 PROCEDURE — 82550 ASSAY OF CK (CPK): CPT | Performed by: INTERNAL MEDICINE

## 2022-01-01 PROCEDURE — 87635 SARS-COV-2 COVID-19 AMP PRB: CPT | Performed by: PHYSICIAN ASSISTANT

## 2022-01-01 PROCEDURE — 83605 ASSAY OF LACTIC ACID: CPT | Performed by: STUDENT IN AN ORGANIZED HEALTH CARE EDUCATION/TRAINING PROGRAM

## 2022-01-01 PROCEDURE — 84295 ASSAY OF SERUM SODIUM: CPT | Performed by: INTERNAL MEDICINE

## 2022-01-01 PROCEDURE — 84132 ASSAY OF SERUM POTASSIUM: CPT | Performed by: STUDENT IN AN ORGANIZED HEALTH CARE EDUCATION/TRAINING PROGRAM

## 2022-01-01 PROCEDURE — 97110 THERAPEUTIC EXERCISES: CPT | Mod: GP | Performed by: PHYSICAL THERAPIST

## 2022-01-01 PROCEDURE — 99238 HOSP IP/OBS DSCHRG MGMT 30/<: CPT | Performed by: STUDENT IN AN ORGANIZED HEALTH CARE EDUCATION/TRAINING PROGRAM

## 2022-01-01 PROCEDURE — G1004 CDSM NDSC: HCPCS

## 2022-01-01 PROCEDURE — 82550 ASSAY OF CK (CPK): CPT | Performed by: PHYSICIAN ASSISTANT

## 2022-01-01 PROCEDURE — 80076 HEPATIC FUNCTION PANEL: CPT | Performed by: INTERNAL MEDICINE

## 2022-01-01 PROCEDURE — 83605 ASSAY OF LACTIC ACID: CPT | Performed by: INTERNAL MEDICINE

## 2022-01-01 PROCEDURE — 84443 ASSAY THYROID STIM HORMONE: CPT | Performed by: STUDENT IN AN ORGANIZED HEALTH CARE EDUCATION/TRAINING PROGRAM

## 2022-01-01 PROCEDURE — 84132 ASSAY OF SERUM POTASSIUM: CPT | Performed by: INTERNAL MEDICINE

## 2022-01-01 PROCEDURE — 97530 THERAPEUTIC ACTIVITIES: CPT | Mod: GO | Performed by: OCCUPATIONAL THERAPIST

## 2022-01-01 PROCEDURE — 87493 C DIFF AMPLIFIED PROBE: CPT | Performed by: INTERNAL MEDICINE

## 2022-01-01 PROCEDURE — 82248 BILIRUBIN DIRECT: CPT | Performed by: STUDENT IN AN ORGANIZED HEALTH CARE EDUCATION/TRAINING PROGRAM

## 2022-01-01 PROCEDURE — 250N000013 HC RX MED GY IP 250 OP 250 PS 637: Performed by: PHYSICIAN ASSISTANT

## 2022-01-01 PROCEDURE — 81001 URINALYSIS AUTO W/SCOPE: CPT | Performed by: PHYSICIAN ASSISTANT

## 2022-01-01 PROCEDURE — 83735 ASSAY OF MAGNESIUM: CPT | Performed by: PHYSICIAN ASSISTANT

## 2022-01-01 PROCEDURE — 99215 OFFICE O/P EST HI 40 MIN: CPT | Performed by: INTERNAL MEDICINE

## 2022-01-01 PROCEDURE — 92610 EVALUATE SWALLOWING FUNCTION: CPT | Mod: GN

## 2022-01-01 PROCEDURE — 87040 BLOOD CULTURE FOR BACTERIA: CPT | Performed by: INTERNAL MEDICINE

## 2022-01-01 PROCEDURE — 99309 SBSQ NF CARE MODERATE MDM 30: CPT | Performed by: FAMILY MEDICINE

## 2022-01-01 PROCEDURE — 85027 COMPLETE CBC AUTOMATED: CPT | Performed by: STUDENT IN AN ORGANIZED HEALTH CARE EDUCATION/TRAINING PROGRAM

## 2022-01-01 PROCEDURE — 97535 SELF CARE MNGMENT TRAINING: CPT | Mod: GO | Performed by: OCCUPATIONAL THERAPIST

## 2022-01-01 PROCEDURE — 80053 COMPREHEN METABOLIC PANEL: CPT | Performed by: PHYSICIAN ASSISTANT

## 2022-01-01 PROCEDURE — 999N000127 HC STATISTIC PERIPHERAL IV START W US GUIDANCE

## 2022-01-01 PROCEDURE — 71045 X-RAY EXAM CHEST 1 VIEW: CPT

## 2022-01-01 PROCEDURE — 258N000003 HC RX IP 258 OP 636: Performed by: PHYSICIAN ASSISTANT

## 2022-01-01 PROCEDURE — 36415 COLL VENOUS BLD VENIPUNCTURE: CPT | Performed by: EMERGENCY MEDICINE

## 2022-01-01 PROCEDURE — 72125 CT NECK SPINE W/O DYE: CPT

## 2022-01-01 PROCEDURE — 85014 HEMATOCRIT: CPT | Performed by: PHYSICIAN ASSISTANT

## 2022-01-01 PROCEDURE — 92526 ORAL FUNCTION THERAPY: CPT | Mod: GN

## 2022-01-01 PROCEDURE — 99285 EMERGENCY DEPT VISIT HI MDM: CPT | Mod: 25

## 2022-01-01 PROCEDURE — 99214 OFFICE O/P EST MOD 30 MIN: CPT | Performed by: NURSE PRACTITIONER

## 2022-01-01 PROCEDURE — U0005 INFEC AGEN DETEC AMPLI PROBE: HCPCS | Performed by: FAMILY MEDICINE

## 2022-01-01 PROCEDURE — 93010 ELECTROCARDIOGRAM REPORT: CPT | Mod: RTG | Performed by: INTERNAL MEDICINE

## 2022-01-01 PROCEDURE — 99223 1ST HOSP IP/OBS HIGH 75: CPT | Mod: AI | Performed by: STUDENT IN AN ORGANIZED HEALTH CARE EDUCATION/TRAINING PROGRAM

## 2022-01-01 PROCEDURE — 250N000012 HC RX MED GY IP 250 OP 636 PS 637: Performed by: STUDENT IN AN ORGANIZED HEALTH CARE EDUCATION/TRAINING PROGRAM

## 2022-01-01 PROCEDURE — 99207 PR NO CHARGE LOS: CPT | Performed by: INTERNAL MEDICINE

## 2022-01-01 PROCEDURE — 93005 ELECTROCARDIOGRAM TRACING: CPT

## 2022-01-01 PROCEDURE — 84145 PROCALCITONIN (PCT): CPT | Performed by: STUDENT IN AN ORGANIZED HEALTH CARE EDUCATION/TRAINING PROGRAM

## 2022-01-01 PROCEDURE — G0463 HOSPITAL OUTPT CLINIC VISIT: HCPCS | Mod: 25

## 2022-01-01 PROCEDURE — 97162 PT EVAL MOD COMPLEX 30 MIN: CPT | Mod: GP | Performed by: PHYSICAL THERAPIST

## 2022-01-01 PROCEDURE — 80202 ASSAY OF VANCOMYCIN: CPT | Performed by: STUDENT IN AN ORGANIZED HEALTH CARE EDUCATION/TRAINING PROGRAM

## 2022-01-01 PROCEDURE — 99214 OFFICE O/P EST MOD 30 MIN: CPT | Performed by: FAMILY MEDICINE

## 2022-01-01 PROCEDURE — 85049 AUTOMATED PLATELET COUNT: CPT | Performed by: STUDENT IN AN ORGANIZED HEALTH CARE EDUCATION/TRAINING PROGRAM

## 2022-01-01 PROCEDURE — U0005 INFEC AGEN DETEC AMPLI PROBE: HCPCS | Performed by: INTERNAL MEDICINE

## 2022-01-01 PROCEDURE — 85014 HEMATOCRIT: CPT | Performed by: INTERNAL MEDICINE

## 2022-01-01 PROCEDURE — 82140 ASSAY OF AMMONIA: CPT | Performed by: STUDENT IN AN ORGANIZED HEALTH CARE EDUCATION/TRAINING PROGRAM

## 2022-01-01 PROCEDURE — U0003 INFECTIOUS AGENT DETECTION BY NUCLEIC ACID (DNA OR RNA); SEVERE ACUTE RESPIRATORY SYNDROME CORONAVIRUS 2 (SARS-COV-2) (CORONAVIRUS DISEASE [COVID-19]), AMPLIFIED PROBE TECHNIQUE, MAKING USE OF HIGH THROUGHPUT TECHNOLOGIES AS DESCRIBED BY CMS-2020-01-R: HCPCS | Performed by: FAMILY MEDICINE

## 2022-01-01 PROCEDURE — 99231 SBSQ HOSP IP/OBS SF/LOW 25: CPT | Performed by: STUDENT IN AN ORGANIZED HEALTH CARE EDUCATION/TRAINING PROGRAM

## 2022-01-01 PROCEDURE — 36416 COLLJ CAPILLARY BLOOD SPEC: CPT | Performed by: STUDENT IN AN ORGANIZED HEALTH CARE EDUCATION/TRAINING PROGRAM

## 2022-01-01 PROCEDURE — 81003 URINALYSIS AUTO W/O SCOPE: CPT | Performed by: FAMILY MEDICINE

## 2022-01-01 PROCEDURE — C9803 HOPD COVID-19 SPEC COLLECT: HCPCS

## 2022-01-01 RX ORDER — LORAZEPAM 2 MG/ML
1 INJECTION INTRAMUSCULAR
Status: DISCONTINUED | OUTPATIENT
Start: 2022-01-01 | End: 2022-01-01 | Stop reason: HOSPADM

## 2022-01-01 RX ORDER — ATROPINE SULFATE 10 MG/ML
2 SOLUTION/ DROPS OPHTHALMIC EVERY 4 HOURS PRN
Status: DISCONTINUED | OUTPATIENT
Start: 2022-01-01 | End: 2022-01-01 | Stop reason: HOSPADM

## 2022-01-01 RX ORDER — ONDANSETRON 4 MG/1
4 TABLET, ORALLY DISINTEGRATING ORAL EVERY 6 HOURS PRN
Status: DISCONTINUED | OUTPATIENT
Start: 2022-01-01 | End: 2022-01-01 | Stop reason: HOSPADM

## 2022-01-01 RX ORDER — DEXTROSE MONOHYDRATE 25 G/50ML
25-50 INJECTION, SOLUTION INTRAVENOUS
Status: DISCONTINUED | OUTPATIENT
Start: 2022-01-01 | End: 2022-01-01 | Stop reason: HOSPADM

## 2022-01-01 RX ORDER — PIPERACILLIN SODIUM, TAZOBACTAM SODIUM 2; .25 G/10ML; G/10ML
2.25 INJECTION, POWDER, LYOPHILIZED, FOR SOLUTION INTRAVENOUS EVERY 6 HOURS
Status: DISCONTINUED | OUTPATIENT
Start: 2022-01-01 | End: 2022-01-01

## 2022-01-01 RX ORDER — POTASSIUM CHLORIDE 1500 MG/1
20 TABLET, EXTENDED RELEASE ORAL ONCE
Status: COMPLETED | OUTPATIENT
Start: 2022-01-01 | End: 2022-01-01

## 2022-01-01 RX ORDER — POTASSIUM CHLORIDE 1.5 G/1.58G
20 POWDER, FOR SOLUTION ORAL ONCE
Status: COMPLETED | OUTPATIENT
Start: 2022-01-01 | End: 2022-01-01

## 2022-01-01 RX ORDER — LORAZEPAM 1 MG/1
1 TABLET ORAL
Qty: 24 TABLET | Refills: 0 | Status: SHIPPED | OUTPATIENT
Start: 2022-01-01

## 2022-01-01 RX ORDER — NALOXONE HYDROCHLORIDE 0.4 MG/ML
0.1 INJECTION, SOLUTION INTRAMUSCULAR; INTRAVENOUS; SUBCUTANEOUS
Status: DISCONTINUED | OUTPATIENT
Start: 2022-01-01 | End: 2022-01-01 | Stop reason: HOSPADM

## 2022-01-01 RX ORDER — SODIUM CHLORIDE, SODIUM LACTATE, POTASSIUM CHLORIDE, CALCIUM CHLORIDE 600; 310; 30; 20 MG/100ML; MG/100ML; MG/100ML; MG/100ML
INJECTION, SOLUTION INTRAVENOUS CONTINUOUS
Status: DISCONTINUED | OUTPATIENT
Start: 2022-01-01 | End: 2022-01-01

## 2022-01-01 RX ORDER — BENZTROPINE MESYLATE 1 MG/1
1 TABLET ORAL 3 TIMES DAILY PRN
Status: DISCONTINUED | OUTPATIENT
Start: 2022-01-01 | End: 2022-01-01 | Stop reason: HOSPADM

## 2022-01-01 RX ORDER — POTASSIUM CHLORIDE 20MEQ/15ML
20 LIQUID (ML) ORAL ONCE
Status: COMPLETED | OUTPATIENT
Start: 2022-01-01 | End: 2022-01-01

## 2022-01-01 RX ORDER — LIDOCAINE 40 MG/G
CREAM TOPICAL
Status: DISCONTINUED | OUTPATIENT
Start: 2022-01-01 | End: 2022-01-01 | Stop reason: HOSPADM

## 2022-01-01 RX ORDER — HYDRALAZINE HYDROCHLORIDE 20 MG/ML
10 INJECTION INTRAMUSCULAR; INTRAVENOUS EVERY 4 HOURS PRN
Status: DISCONTINUED | OUTPATIENT
Start: 2022-01-01 | End: 2022-01-01 | Stop reason: HOSPADM

## 2022-01-01 RX ORDER — AMOXICILLIN 250 MG
2 CAPSULE ORAL 2 TIMES DAILY PRN
Status: DISCONTINUED | OUTPATIENT
Start: 2022-01-01 | End: 2022-01-01 | Stop reason: HOSPADM

## 2022-01-01 RX ORDER — METOPROLOL TARTRATE 1 MG/ML
5 INJECTION, SOLUTION INTRAVENOUS ONCE
Status: COMPLETED | OUTPATIENT
Start: 2022-01-01 | End: 2022-01-01

## 2022-01-01 RX ORDER — CARBOXYMETHYLCELLULOSE SODIUM 5 MG/ML
1-2 SOLUTION/ DROPS OPHTHALMIC
Qty: 1 EACH | Refills: 0 | Status: SHIPPED | OUTPATIENT
Start: 2022-01-01

## 2022-01-01 RX ORDER — METOPROLOL SUCCINATE 100 MG/1
100 TABLET, EXTENDED RELEASE ORAL DAILY
Status: DISCONTINUED | OUTPATIENT
Start: 2022-01-01 | End: 2022-01-01

## 2022-01-01 RX ORDER — ACETAMINOPHEN 650 MG/1
650 SUPPOSITORY RECTAL EVERY 6 HOURS PRN
Qty: 12 SUPPOSITORY | Refills: 0 | Status: SHIPPED | OUTPATIENT
Start: 2022-01-01

## 2022-01-01 RX ORDER — NALOXONE HYDROCHLORIDE 0.4 MG/ML
0.2 INJECTION, SOLUTION INTRAMUSCULAR; INTRAVENOUS; SUBCUTANEOUS
Status: DISCONTINUED | OUTPATIENT
Start: 2022-01-01 | End: 2022-01-01 | Stop reason: HOSPADM

## 2022-01-01 RX ORDER — LISINOPRIL 10 MG/1
40 TABLET ORAL DAILY
Status: DISCONTINUED | OUTPATIENT
Start: 2022-01-01 | End: 2022-01-01 | Stop reason: HOSPADM

## 2022-01-01 RX ORDER — ACETAMINOPHEN 650 MG/1
650 SUPPOSITORY RECTAL EVERY 6 HOURS PRN
Status: DISCONTINUED | OUTPATIENT
Start: 2022-01-01 | End: 2022-01-01 | Stop reason: HOSPADM

## 2022-01-01 RX ORDER — ATROPINE SULFATE 10 MG/ML
2 SOLUTION/ DROPS OPHTHALMIC EVERY 4 HOURS PRN
Qty: 15 ML | Refills: 0 | Status: SHIPPED | OUTPATIENT
Start: 2022-01-01

## 2022-01-01 RX ORDER — VITAMIN B COMPLEX
2000 TABLET ORAL DAILY
Status: DISCONTINUED | OUTPATIENT
Start: 2022-01-01 | End: 2022-01-01

## 2022-01-01 RX ORDER — CEPHALEXIN 500 MG/1
500 CAPSULE ORAL 2 TIMES DAILY
Qty: 14 CAPSULE | Refills: 0 | Status: SHIPPED | OUTPATIENT
Start: 2022-01-01 | End: 2022-01-01

## 2022-01-01 RX ORDER — SODIUM CHLORIDE 9 MG/ML
INJECTION, SOLUTION INTRAVENOUS CONTINUOUS
Status: DISCONTINUED | OUTPATIENT
Start: 2022-01-01 | End: 2022-01-01

## 2022-01-01 RX ORDER — BISACODYL 10 MG
10 SUPPOSITORY, RECTAL RECTAL
Status: DISCONTINUED | OUTPATIENT
Start: 2022-01-01 | End: 2022-01-01 | Stop reason: HOSPADM

## 2022-01-01 RX ORDER — ONDANSETRON 2 MG/ML
4 INJECTION INTRAMUSCULAR; INTRAVENOUS EVERY 6 HOURS PRN
Status: DISCONTINUED | OUTPATIENT
Start: 2022-01-01 | End: 2022-01-01 | Stop reason: HOSPADM

## 2022-01-01 RX ORDER — CARBOXYMETHYLCELLULOSE SODIUM 5 MG/ML
1-2 SOLUTION/ DROPS OPHTHALMIC
Status: DISCONTINUED | OUTPATIENT
Start: 2022-01-01 | End: 2022-01-01 | Stop reason: HOSPADM

## 2022-01-01 RX ORDER — BISACODYL 10 MG
10 SUPPOSITORY, RECTAL RECTAL
Qty: 3 SUPPOSITORY | Refills: 0 | Status: SHIPPED | OUTPATIENT
Start: 2022-01-01

## 2022-01-01 RX ORDER — AMOXICILLIN 250 MG
1 CAPSULE ORAL 2 TIMES DAILY PRN
Status: DISCONTINUED | OUTPATIENT
Start: 2022-01-01 | End: 2022-01-01 | Stop reason: HOSPADM

## 2022-01-01 RX ORDER — METOPROLOL TARTRATE 1 MG/ML
5 INJECTION, SOLUTION INTRAVENOUS EVERY 6 HOURS PRN
Status: DISCONTINUED | OUTPATIENT
Start: 2022-01-01 | End: 2022-01-01 | Stop reason: HOSPADM

## 2022-01-01 RX ORDER — ENOXAPARIN SODIUM 100 MG/ML
30 INJECTION SUBCUTANEOUS EVERY 24 HOURS
Status: DISCONTINUED | OUTPATIENT
Start: 2022-01-01 | End: 2022-01-01

## 2022-01-01 RX ORDER — RISPERIDONE 0.5 MG/1
0.5 TABLET, ORALLY DISINTEGRATING ORAL 2 TIMES DAILY PRN
Qty: 6 TABLET | Refills: 0 | Status: SHIPPED | OUTPATIENT
Start: 2022-01-01

## 2022-01-01 RX ORDER — PROCHLORPERAZINE 25 MG
12.5 SUPPOSITORY, RECTAL RECTAL EVERY 12 HOURS PRN
Status: DISCONTINUED | OUTPATIENT
Start: 2022-01-01 | End: 2022-01-01 | Stop reason: HOSPADM

## 2022-01-01 RX ORDER — NICOTINE POLACRILEX 4 MG
15-30 LOZENGE BUCCAL
Status: DISCONTINUED | OUTPATIENT
Start: 2022-01-01 | End: 2022-01-01 | Stop reason: HOSPADM

## 2022-01-01 RX ORDER — CEFTRIAXONE 1 G/1
1 INJECTION, POWDER, FOR SOLUTION INTRAMUSCULAR; INTRAVENOUS EVERY 24 HOURS
Status: DISCONTINUED | OUTPATIENT
Start: 2022-01-01 | End: 2022-01-01

## 2022-01-01 RX ORDER — ACETAMINOPHEN 325 MG/10.15ML
650 LIQUID ORAL EVERY 6 HOURS PRN
Status: DISCONTINUED | OUTPATIENT
Start: 2022-01-01 | End: 2022-01-01 | Stop reason: HOSPADM

## 2022-01-01 RX ORDER — ACETAMINOPHEN 500 MG
1000 TABLET ORAL 2 TIMES DAILY
Status: DISCONTINUED | OUTPATIENT
Start: 2022-01-01 | End: 2022-01-01 | Stop reason: HOSPADM

## 2022-01-01 RX ORDER — LOPERAMIDE HCL 2 MG
2 CAPSULE ORAL 4 TIMES DAILY PRN
Status: DISCONTINUED | OUTPATIENT
Start: 2022-01-01 | End: 2022-01-01 | Stop reason: HOSPADM

## 2022-01-01 RX ORDER — LISINOPRIL 40 MG/1
40 TABLET ORAL DAILY
Status: DISCONTINUED | OUTPATIENT
Start: 2022-01-01 | End: 2022-01-01

## 2022-01-01 RX ORDER — ACETAMINOPHEN 325 MG/10.15ML
650 LIQUID ORAL EVERY 6 HOURS PRN
Qty: 473 ML | Refills: 0 | Status: SHIPPED | OUTPATIENT
Start: 2022-01-01 | End: 2022-01-01

## 2022-01-01 RX ORDER — PROCHLORPERAZINE MALEATE 5 MG
5 TABLET ORAL EVERY 6 HOURS PRN
Status: DISCONTINUED | OUTPATIENT
Start: 2022-01-01 | End: 2022-01-01 | Stop reason: HOSPADM

## 2022-01-01 RX ORDER — LORAZEPAM 1 MG/1
1 TABLET ORAL
Qty: 30 TABLET | Refills: 0 | OUTPATIENT
Start: 2022-01-01

## 2022-01-01 RX ORDER — MAGNESIUM SULFATE HEPTAHYDRATE 40 MG/ML
2 INJECTION, SOLUTION INTRAVENOUS ONCE
Status: COMPLETED | OUTPATIENT
Start: 2022-01-01 | End: 2022-01-01

## 2022-01-01 RX ORDER — LORAZEPAM 1 MG/1
1 TABLET ORAL
Status: DISCONTINUED | OUTPATIENT
Start: 2022-01-01 | End: 2022-01-01 | Stop reason: HOSPADM

## 2022-01-01 RX ORDER — LOPERAMIDE HCL 2 MG
2 CAPSULE ORAL 4 TIMES DAILY PRN
Qty: 12 CAPSULE | Refills: 0 | Status: SHIPPED | OUTPATIENT
Start: 2022-01-01

## 2022-01-01 RX ORDER — AZITHROMYCIN 500 MG/5ML
500 INJECTION, POWDER, LYOPHILIZED, FOR SOLUTION INTRAVENOUS EVERY 24 HOURS
Status: DISCONTINUED | OUTPATIENT
Start: 2022-01-01 | End: 2022-01-01

## 2022-01-01 RX ORDER — DEXTROSE MONOHYDRATE 50 MG/ML
INJECTION, SOLUTION INTRAVENOUS CONTINUOUS
Status: DISCONTINUED | OUTPATIENT
Start: 2022-01-01 | End: 2022-01-01

## 2022-01-01 RX ORDER — DONEPEZIL HYDROCHLORIDE 5 MG/1
5 TABLET, FILM COATED ORAL AT BEDTIME
Qty: 90 TABLET | Refills: 3 | Status: SHIPPED | OUTPATIENT
Start: 2022-01-01 | End: 2022-01-01

## 2022-01-01 RX ORDER — ACETAMINOPHEN 325 MG/1
650 TABLET ORAL EVERY 6 HOURS PRN
Status: DISCONTINUED | OUTPATIENT
Start: 2022-01-01 | End: 2022-01-01 | Stop reason: CLARIF

## 2022-01-01 RX ORDER — DIPHENHYDRAMINE HYDROCHLORIDE 25 MG/1
TABLET ORAL DAILY
Status: DISCONTINUED | OUTPATIENT
Start: 2022-01-01 | End: 2022-01-01

## 2022-01-01 RX ORDER — POTASSIUM CHLORIDE 7.45 MG/ML
10 INJECTION INTRAVENOUS
Status: COMPLETED | OUTPATIENT
Start: 2022-01-01 | End: 2022-01-01

## 2022-01-01 RX ORDER — HALOPERIDOL 5 MG/ML
1 INJECTION INTRAMUSCULAR EVERY 6 HOURS PRN
Status: DISCONTINUED | OUTPATIENT
Start: 2022-01-01 | End: 2022-01-01 | Stop reason: HOSPADM

## 2022-01-01 RX ORDER — ATORVASTATIN CALCIUM 20 MG/1
20 TABLET, FILM COATED ORAL DAILY
Status: DISCONTINUED | OUTPATIENT
Start: 2022-01-01 | End: 2022-01-01

## 2022-01-01 RX ORDER — ONDANSETRON 4 MG/1
4 TABLET, ORALLY DISINTEGRATING ORAL EVERY 6 HOURS PRN
Qty: 12 TABLET | Refills: 0 | Status: SHIPPED | OUTPATIENT
Start: 2022-01-01

## 2022-01-01 RX ADMIN — ATORVASTATIN CALCIUM 20 MG: 20 TABLET, FILM COATED ORAL at 09:50

## 2022-01-01 RX ADMIN — ENOXAPARIN SODIUM 30 MG: 30 INJECTION SUBCUTANEOUS at 19:00

## 2022-01-01 RX ADMIN — PIPERACILLIN AND TAZOBACTAM 2.25 G: 2; .25 INJECTION, POWDER, LYOPHILIZED, FOR SOLUTION INTRAVENOUS at 21:11

## 2022-01-01 RX ADMIN — POTASSIUM CHLORIDE 10 MEQ: 7.46 INJECTION, SOLUTION INTRAVENOUS at 08:42

## 2022-01-01 RX ADMIN — DEXTROSE MONOHYDRATE: 50 INJECTION, SOLUTION INTRAVENOUS at 10:48

## 2022-01-01 RX ADMIN — SODIUM CHLORIDE: 9 INJECTION, SOLUTION INTRAVENOUS at 11:43

## 2022-01-01 RX ADMIN — SODIUM CHLORIDE: 9 INJECTION, SOLUTION INTRAVENOUS at 09:31

## 2022-01-01 RX ADMIN — METOPROLOL TARTRATE 5 MG: 5 INJECTION INTRAVENOUS at 21:55

## 2022-01-01 RX ADMIN — METOPROLOL SUCCINATE 100 MG: 100 TABLET, EXTENDED RELEASE ORAL at 10:18

## 2022-01-01 RX ADMIN — ACETAMINOPHEN 1000 MG: 500 TABLET, FILM COATED ORAL at 21:12

## 2022-01-01 RX ADMIN — POTASSIUM CHLORIDE 20 MEQ: 1500 TABLET, EXTENDED RELEASE ORAL at 08:39

## 2022-01-01 RX ADMIN — METOPROLOL TARTRATE 5 MG: 5 INJECTION INTRAVENOUS at 10:09

## 2022-01-01 RX ADMIN — PIPERACILLIN AND TAZOBACTAM 2.25 G: 2; .25 INJECTION, POWDER, LYOPHILIZED, FOR SOLUTION INTRAVENOUS at 09:32

## 2022-01-01 RX ADMIN — ACETAMINOPHEN 1000 MG: 500 TABLET, FILM COATED ORAL at 09:50

## 2022-01-01 RX ADMIN — DEXTROSE MONOHYDRATE: 50 INJECTION, SOLUTION INTRAVENOUS at 01:44

## 2022-01-01 RX ADMIN — LISINOPRIL 40 MG: 40 TABLET ORAL at 10:10

## 2022-01-01 RX ADMIN — PIPERACILLIN AND TAZOBACTAM 2.25 G: 2; .25 INJECTION, POWDER, LYOPHILIZED, FOR SOLUTION INTRAVENOUS at 20:05

## 2022-01-01 RX ADMIN — LISINOPRIL 40 MG: 40 TABLET ORAL at 09:50

## 2022-01-01 RX ADMIN — TAZOBACTAM SODIUM AND PIPERACILLIN SODIUM 3.38 G: 375; 3 INJECTION, SOLUTION INTRAVENOUS at 16:29

## 2022-01-01 RX ADMIN — TAZOBACTAM SODIUM AND PIPERACILLIN SODIUM 3.38 G: 375; 3 INJECTION, SOLUTION INTRAVENOUS at 10:43

## 2022-01-01 RX ADMIN — METOPROLOL TARTRATE 5 MG: 5 INJECTION INTRAVENOUS at 09:12

## 2022-01-01 RX ADMIN — TAZOBACTAM SODIUM AND PIPERACILLIN SODIUM 3.38 G: 375; 3 INJECTION, SOLUTION INTRAVENOUS at 16:14

## 2022-01-01 RX ADMIN — ACETAMINOPHEN 650 MG: 650 SUPPOSITORY RECTAL at 16:14

## 2022-01-01 RX ADMIN — TAZOBACTAM SODIUM AND PIPERACILLIN SODIUM 3.38 G: 375; 3 INJECTION, SOLUTION INTRAVENOUS at 04:27

## 2022-01-01 RX ADMIN — SODIUM CHLORIDE: 9 INJECTION, SOLUTION INTRAVENOUS at 21:58

## 2022-01-01 RX ADMIN — SODIUM CHLORIDE: 9 INJECTION, SOLUTION INTRAVENOUS at 08:31

## 2022-01-01 RX ADMIN — ENOXAPARIN SODIUM 30 MG: 30 INJECTION SUBCUTANEOUS at 20:33

## 2022-01-01 RX ADMIN — ENOXAPARIN SODIUM 30 MG: 30 INJECTION SUBCUTANEOUS at 21:05

## 2022-01-01 RX ADMIN — PIPERACILLIN AND TAZOBACTAM 2.25 G: 2; .25 INJECTION, POWDER, LYOPHILIZED, FOR SOLUTION INTRAVENOUS at 20:55

## 2022-01-01 RX ADMIN — LISINOPRIL 40 MG: 10 TABLET ORAL at 16:57

## 2022-01-01 RX ADMIN — INSULIN ASPART 1 UNITS: 100 INJECTION, SOLUTION INTRAVENOUS; SUBCUTANEOUS at 17:50

## 2022-01-01 RX ADMIN — METOPROLOL SUCCINATE 100 MG: 100 TABLET, EXTENDED RELEASE ORAL at 09:00

## 2022-01-01 RX ADMIN — ACETAMINOPHEN 650 MG: 325 SOLUTION ORAL at 12:23

## 2022-01-01 RX ADMIN — ENOXAPARIN SODIUM 30 MG: 30 INJECTION SUBCUTANEOUS at 20:07

## 2022-01-01 RX ADMIN — INSULIN ASPART 1 UNITS: 100 INJECTION, SOLUTION INTRAVENOUS; SUBCUTANEOUS at 08:37

## 2022-01-01 RX ADMIN — TAZOBACTAM SODIUM AND PIPERACILLIN SODIUM 3.38 G: 375; 3 INJECTION, SOLUTION INTRAVENOUS at 21:55

## 2022-01-01 RX ADMIN — ACETAMINOPHEN 650 MG: 650 SUPPOSITORY RECTAL at 09:30

## 2022-01-01 RX ADMIN — INSULIN ASPART 1 UNITS: 100 INJECTION, SOLUTION INTRAVENOUS; SUBCUTANEOUS at 13:35

## 2022-01-01 RX ADMIN — SODIUM CHLORIDE: 9 INJECTION, SOLUTION INTRAVENOUS at 21:10

## 2022-01-01 RX ADMIN — Medication 2000 UNITS: at 17:35

## 2022-01-01 RX ADMIN — POTASSIUM CHLORIDE 20 MEQ: 20 SOLUTION ORAL at 12:29

## 2022-01-01 RX ADMIN — SODIUM CHLORIDE 1000 ML: 9 INJECTION, SOLUTION INTRAVENOUS at 15:18

## 2022-01-01 RX ADMIN — MICONAZOLE NITRATE: 20 POWDER TOPICAL at 09:31

## 2022-01-01 RX ADMIN — LOPERAMIDE HYDROCHLORIDE 2 MG: 2 CAPSULE ORAL at 13:10

## 2022-01-01 RX ADMIN — VANCOMYCIN HYDROCHLORIDE 1000 MG: 10 INJECTION, POWDER, LYOPHILIZED, FOR SOLUTION INTRAVENOUS at 22:30

## 2022-01-01 RX ADMIN — PIPERACILLIN AND TAZOBACTAM 2.25 G: 2; .25 INJECTION, POWDER, LYOPHILIZED, FOR SOLUTION INTRAVENOUS at 10:18

## 2022-01-01 RX ADMIN — LISINOPRIL 40 MG: 40 TABLET ORAL at 17:32

## 2022-01-01 RX ADMIN — INSULIN ASPART 1 UNITS: 100 INJECTION, SOLUTION INTRAVENOUS; SUBCUTANEOUS at 14:04

## 2022-01-01 RX ADMIN — MICONAZOLE NITRATE: 20 POWDER TOPICAL at 22:30

## 2022-01-01 RX ADMIN — INSULIN ASPART 2 UNITS: 100 INJECTION, SOLUTION INTRAVENOUS; SUBCUTANEOUS at 20:20

## 2022-01-01 RX ADMIN — SODIUM CHLORIDE 500 ML: 9 INJECTION, SOLUTION INTRAVENOUS at 12:16

## 2022-01-01 RX ADMIN — SODIUM CHLORIDE: 9 INJECTION, SOLUTION INTRAVENOUS at 17:23

## 2022-01-01 RX ADMIN — PIPERACILLIN AND TAZOBACTAM 2.25 G: 2; .25 INJECTION, POWDER, LYOPHILIZED, FOR SOLUTION INTRAVENOUS at 08:28

## 2022-01-01 RX ADMIN — TAZOBACTAM SODIUM AND PIPERACILLIN SODIUM 3.38 G: 375; 3 INJECTION, SOLUTION INTRAVENOUS at 10:49

## 2022-01-01 RX ADMIN — ACETAMINOPHEN 650 MG: 650 SUPPOSITORY RECTAL at 22:04

## 2022-01-01 RX ADMIN — Medication 2000 UNITS: at 10:10

## 2022-01-01 RX ADMIN — MAGNESIUM SULFATE HEPTAHYDRATE 2 G: 40 INJECTION, SOLUTION INTRAVENOUS at 19:09

## 2022-01-01 RX ADMIN — ENOXAPARIN SODIUM 30 MG: 30 INJECTION SUBCUTANEOUS at 19:39

## 2022-01-01 RX ADMIN — PIPERACILLIN AND TAZOBACTAM 2.25 G: 2; .25 INJECTION, POWDER, LYOPHILIZED, FOR SOLUTION INTRAVENOUS at 02:48

## 2022-01-01 RX ADMIN — MAGNESIUM SULFATE HEPTAHYDRATE 2 G: 2 INJECTION, SOLUTION INTRAVENOUS at 13:00

## 2022-01-01 RX ADMIN — ACETAMINOPHEN 1000 MG: 500 TABLET, FILM COATED ORAL at 20:55

## 2022-01-01 RX ADMIN — TAZOBACTAM SODIUM AND PIPERACILLIN SODIUM 3.38 G: 375; 3 INJECTION, SOLUTION INTRAVENOUS at 04:05

## 2022-01-01 RX ADMIN — TAZOBACTAM SODIUM AND PIPERACILLIN SODIUM 3.38 G: 375; 3 INJECTION, SOLUTION INTRAVENOUS at 03:33

## 2022-01-01 RX ADMIN — POTASSIUM CHLORIDE 20 MEQ: 1.5 POWDER, FOR SOLUTION ORAL at 19:10

## 2022-01-01 RX ADMIN — MICONAZOLE NITRATE: 20 POWDER TOPICAL at 09:21

## 2022-01-01 RX ADMIN — METOPROLOL SUCCINATE 100 MG: 100 TABLET, EXTENDED RELEASE ORAL at 08:49

## 2022-01-01 RX ADMIN — METOPROLOL SUCCINATE 100 MG: 100 TABLET, EXTENDED RELEASE ORAL at 10:10

## 2022-01-01 RX ADMIN — ACETAMINOPHEN 650 MG: 650 SUPPOSITORY RECTAL at 02:49

## 2022-01-01 RX ADMIN — METOPROLOL TARTRATE 5 MG: 5 INJECTION INTRAVENOUS at 18:17

## 2022-01-01 RX ADMIN — LISINOPRIL 40 MG: 40 TABLET ORAL at 08:29

## 2022-01-01 RX ADMIN — PIPERACILLIN AND TAZOBACTAM 2.25 G: 2; .25 INJECTION, POWDER, LYOPHILIZED, FOR SOLUTION INTRAVENOUS at 15:22

## 2022-01-01 RX ADMIN — INSULIN ASPART 1 UNITS: 100 INJECTION, SOLUTION INTRAVENOUS; SUBCUTANEOUS at 09:06

## 2022-01-01 RX ADMIN — AZITHROMYCIN MONOHYDRATE 500 MG: 500 INJECTION, POWDER, LYOPHILIZED, FOR SOLUTION INTRAVENOUS at 22:38

## 2022-01-01 RX ADMIN — ACETAMINOPHEN 650 MG: 650 SUPPOSITORY RECTAL at 09:12

## 2022-01-01 RX ADMIN — DEXTROSE MONOHYDRATE: 50 INJECTION, SOLUTION INTRAVENOUS at 21:30

## 2022-01-01 RX ADMIN — METOPROLOL SUCCINATE 100 MG: 100 TABLET, EXTENDED RELEASE ORAL at 08:39

## 2022-01-01 RX ADMIN — PIPERACILLIN AND TAZOBACTAM 2.25 G: 2; .25 INJECTION, POWDER, LYOPHILIZED, FOR SOLUTION INTRAVENOUS at 01:44

## 2022-01-01 RX ADMIN — ENOXAPARIN SODIUM 30 MG: 30 INJECTION SUBCUTANEOUS at 19:44

## 2022-01-01 RX ADMIN — ACETAMINOPHEN 650 MG: 325 TABLET, FILM COATED ORAL at 20:59

## 2022-01-01 RX ADMIN — SODIUM CHLORIDE, POTASSIUM CHLORIDE, SODIUM LACTATE AND CALCIUM CHLORIDE 1000 ML: 600; 310; 30; 20 INJECTION, SOLUTION INTRAVENOUS at 21:20

## 2022-01-01 RX ADMIN — ACETAMINOPHEN 1000 MG: 500 TABLET, FILM COATED ORAL at 08:29

## 2022-01-01 RX ADMIN — TAZOBACTAM SODIUM AND PIPERACILLIN SODIUM 3.38 G: 375; 3 INJECTION, SOLUTION INTRAVENOUS at 10:09

## 2022-01-01 RX ADMIN — INSULIN ASPART 1 UNITS: 100 INJECTION, SOLUTION INTRAVENOUS; SUBCUTANEOUS at 17:37

## 2022-01-01 RX ADMIN — MICONAZOLE NITRATE: 20 POWDER TOPICAL at 11:28

## 2022-01-01 RX ADMIN — METOPROLOL TARTRATE 5 MG: 5 INJECTION INTRAVENOUS at 16:05

## 2022-01-01 RX ADMIN — INSULIN ASPART 1 UNITS: 100 INJECTION, SOLUTION INTRAVENOUS; SUBCUTANEOUS at 14:00

## 2022-01-01 RX ADMIN — INSULIN ASPART 1 UNITS: 100 INJECTION, SOLUTION INTRAVENOUS; SUBCUTANEOUS at 08:46

## 2022-01-01 RX ADMIN — PIPERACILLIN AND TAZOBACTAM 2.25 G: 2; .25 INJECTION, POWDER, LYOPHILIZED, FOR SOLUTION INTRAVENOUS at 13:10

## 2022-01-01 RX ADMIN — ACETAMINOPHEN 650 MG: 650 SUPPOSITORY RECTAL at 03:12

## 2022-01-01 RX ADMIN — Medication 2000 UNITS: at 08:29

## 2022-01-01 RX ADMIN — ACETAMINOPHEN 650 MG: 650 SUPPOSITORY RECTAL at 18:25

## 2022-01-01 RX ADMIN — SODIUM CHLORIDE, POTASSIUM CHLORIDE, SODIUM LACTATE AND CALCIUM CHLORIDE: 600; 310; 30; 20 INJECTION, SOLUTION INTRAVENOUS at 22:25

## 2022-01-01 RX ADMIN — PIPERACILLIN AND TAZOBACTAM 2.25 G: 2; .25 INJECTION, POWDER, LYOPHILIZED, FOR SOLUTION INTRAVENOUS at 02:30

## 2022-01-01 RX ADMIN — MAGNESIUM SULFATE HEPTAHYDRATE 2 G: 40 INJECTION, SOLUTION INTRAVENOUS at 11:41

## 2022-01-01 RX ADMIN — HALOPERIDOL LACTATE 1 MG: 5 INJECTION, SOLUTION INTRAMUSCULAR at 10:02

## 2022-01-01 RX ADMIN — INSULIN ASPART 1 UNITS: 100 INJECTION, SOLUTION INTRAVENOUS; SUBCUTANEOUS at 12:45

## 2022-01-01 RX ADMIN — TAZOBACTAM SODIUM AND PIPERACILLIN SODIUM 3.38 G: 375; 3 INJECTION, SOLUTION INTRAVENOUS at 22:41

## 2022-01-01 RX ADMIN — ACETAMINOPHEN 1000 MG: 500 TABLET, FILM COATED ORAL at 20:32

## 2022-01-01 RX ADMIN — Medication 2000 UNITS: at 09:50

## 2022-01-01 RX ADMIN — TAZOBACTAM SODIUM AND PIPERACILLIN SODIUM 3.38 G: 375; 3 INJECTION, SOLUTION INTRAVENOUS at 16:02

## 2022-01-01 RX ADMIN — ACETAMINOPHEN 650 MG: 325 SOLUTION ORAL at 16:17

## 2022-01-01 RX ADMIN — POTASSIUM CHLORIDE 20 MEQ: 1500 TABLET, EXTENDED RELEASE ORAL at 14:19

## 2022-01-01 RX ADMIN — HALOPERIDOL LACTATE 1 MG: 5 INJECTION, SOLUTION INTRAMUSCULAR at 22:40

## 2022-01-01 RX ADMIN — INSULIN ASPART 1 UNITS: 100 INJECTION, SOLUTION INTRAVENOUS; SUBCUTANEOUS at 12:30

## 2022-01-01 RX ADMIN — AZITHROMYCIN MONOHYDRATE 500 MG: 500 INJECTION, POWDER, LYOPHILIZED, FOR SOLUTION INTRAVENOUS at 00:09

## 2022-01-01 RX ADMIN — ACETAMINOPHEN 650 MG: 650 SUPPOSITORY RECTAL at 20:41

## 2022-01-01 RX ADMIN — ENOXAPARIN SODIUM 30 MG: 30 INJECTION SUBCUTANEOUS at 17:35

## 2022-01-01 RX ADMIN — DEXTROSE AND SODIUM CHLORIDE: 5; 900 INJECTION, SOLUTION INTRAVENOUS at 03:41

## 2022-01-01 RX ADMIN — VANCOMYCIN HYDROCHLORIDE 750 MG: 1 INJECTION, POWDER, LYOPHILIZED, FOR SOLUTION INTRAVENOUS at 19:46

## 2022-01-01 RX ADMIN — METOPROLOL SUCCINATE 100 MG: 100 TABLET, EXTENDED RELEASE ORAL at 08:29

## 2022-01-01 RX ADMIN — INSULIN ASPART 1 UNITS: 100 INJECTION, SOLUTION INTRAVENOUS; SUBCUTANEOUS at 08:55

## 2022-01-01 RX ADMIN — DEXTROSE AND SODIUM CHLORIDE: 5; 900 INJECTION, SOLUTION INTRAVENOUS at 13:59

## 2022-01-01 RX ADMIN — POTASSIUM CHLORIDE 20 MEQ: 1500 TABLET, EXTENDED RELEASE ORAL at 20:50

## 2022-01-01 RX ADMIN — METOPROLOL SUCCINATE 100 MG: 100 TABLET, EXTENDED RELEASE ORAL at 09:50

## 2022-01-01 RX ADMIN — PIPERACILLIN AND TAZOBACTAM 2.25 G: 2; .25 INJECTION, POWDER, LYOPHILIZED, FOR SOLUTION INTRAVENOUS at 17:44

## 2022-01-01 RX ADMIN — LOPERAMIDE HYDROCHLORIDE 2 MG: 2 CAPSULE ORAL at 09:52

## 2022-01-01 RX ADMIN — ACETAMINOPHEN 650 MG: 650 SUPPOSITORY RECTAL at 07:40

## 2022-01-01 RX ADMIN — ATORVASTATIN CALCIUM 20 MG: 20 TABLET, FILM COATED ORAL at 08:30

## 2022-01-01 RX ADMIN — ATORVASTATIN CALCIUM 20 MG: 20 TABLET, FILM COATED ORAL at 10:10

## 2022-01-01 RX ADMIN — ATORVASTATIN CALCIUM 20 MG: 20 TABLET, FILM COATED ORAL at 17:32

## 2022-01-01 RX ADMIN — PIPERACILLIN AND TAZOBACTAM 2.25 G: 2; .25 INJECTION, POWDER, LYOPHILIZED, FOR SOLUTION INTRAVENOUS at 14:06

## 2022-01-01 RX ADMIN — ACETAMINOPHEN 1000 MG: 500 TABLET, FILM COATED ORAL at 10:10

## 2022-01-01 RX ADMIN — TAZOBACTAM SODIUM AND PIPERACILLIN SODIUM 3.38 G: 375; 3 INJECTION, SOLUTION INTRAVENOUS at 22:14

## 2022-01-01 RX ADMIN — CEFTRIAXONE 1 G: 1 INJECTION, POWDER, FOR SOLUTION INTRAMUSCULAR; INTRAVENOUS at 20:42

## 2022-01-01 RX ADMIN — VANCOMYCIN HYDROCHLORIDE 1000 MG: 10 INJECTION, POWDER, LYOPHILIZED, FOR SOLUTION INTRAVENOUS at 21:29

## 2022-01-01 RX ADMIN — METOPROLOL TARTRATE 5 MG: 5 INJECTION INTRAVENOUS at 11:52

## 2022-01-01 RX ADMIN — LOPERAMIDE HYDROCHLORIDE 2 MG: 2 CAPSULE ORAL at 11:27

## 2022-01-01 RX ADMIN — MAGNESIUM SULFATE 2 G: 2 INJECTION INTRAVENOUS at 08:31

## 2022-01-01 RX ADMIN — ACETAMINOPHEN 650 MG: 650 SUPPOSITORY RECTAL at 09:21

## 2022-01-01 RX ADMIN — HYDRALAZINE HYDROCHLORIDE 10 MG: 20 INJECTION INTRAMUSCULAR; INTRAVENOUS at 20:52

## 2022-01-01 RX ADMIN — PIPERACILLIN AND TAZOBACTAM 2.25 G: 2; .25 INJECTION, POWDER, LYOPHILIZED, FOR SOLUTION INTRAVENOUS at 08:49

## 2022-01-01 RX ADMIN — ACETAMINOPHEN 1000 MG: 500 TABLET, FILM COATED ORAL at 20:07

## 2022-01-01 RX ADMIN — CEFTRIAXONE 1 G: 1 INJECTION, POWDER, FOR SOLUTION INTRAMUSCULAR; INTRAVENOUS at 21:57

## 2022-01-01 RX ADMIN — PIPERACILLIN AND TAZOBACTAM 2.25 G: 2; .25 INJECTION, POWDER, LYOPHILIZED, FOR SOLUTION INTRAVENOUS at 02:08

## 2022-01-01 RX ADMIN — POTASSIUM CHLORIDE 10 MEQ: 7.46 INJECTION, SOLUTION INTRAVENOUS at 10:01

## 2022-01-01 RX ADMIN — VANCOMYCIN HYDROCHLORIDE 750 MG: 1 INJECTION, POWDER, LYOPHILIZED, FOR SOLUTION INTRAVENOUS at 19:35

## 2022-01-01 RX ADMIN — DEXTROSE AND SODIUM CHLORIDE: 5; 900 INJECTION, SOLUTION INTRAVENOUS at 14:45

## 2022-01-01 RX ADMIN — ACETAMINOPHEN 650 MG: 650 SUPPOSITORY RECTAL at 01:23

## 2022-01-01 ASSESSMENT — ACTIVITIES OF DAILY LIVING (ADL)
ADLS_ACUITY_SCORE: 36
ADLS_ACUITY_SCORE: 38
ADLS_ACUITY_SCORE: 31
ADLS_ACUITY_SCORE: 29
HEARING_DIFFICULTY_OR_DEAF: YES
ADLS_ACUITY_SCORE: 33
ADLS_ACUITY_SCORE: 31
ADLS_ACUITY_SCORE: 31
ADLS_ACUITY_SCORE: 27
ADLS_ACUITY_SCORE: 27
ADLS_ACUITY_SCORE: 31
ADLS_ACUITY_SCORE: 33
ADLS_ACUITY_SCORE: 27
ADLS_ACUITY_SCORE: 35
ADLS_ACUITY_SCORE: 33
ADLS_ACUITY_SCORE: 31
ADLS_ACUITY_SCORE: 33
ADLS_ACUITY_SCORE: 33
ADLS_ACUITY_SCORE: 36
ADLS_ACUITY_SCORE: 35
ADLS_ACUITY_SCORE: 30
DRESS: 1-->ASSISTANCE (EQUIPMENT/PERSON) NEEDED
ADLS_ACUITY_SCORE: 33
ADLS_ACUITY_SCORE: 31
DRESSING/BATHING_DIFFICULTY: YES
ADLS_ACUITY_SCORE: 33
ADLS_ACUITY_SCORE: 35
BATHING: 1-->ASSISTANCE NEEDED
ADLS_ACUITY_SCORE: 27
ADLS_ACUITY_SCORE: 33
ADLS_ACUITY_SCORE: 35
ADLS_ACUITY_SCORE: 31
WEAR_GLASSES_OR_BLIND: YES
ADLS_ACUITY_SCORE: 35
ADLS_ACUITY_SCORE: 31
ADLS_ACUITY_SCORE: 36
DIFFICULTY_COMMUNICATING: NO
ADLS_ACUITY_SCORE: 31
ADLS_ACUITY_SCORE: 35
ADLS_ACUITY_SCORE: 33
ADLS_ACUITY_SCORE: 35
ADLS_ACUITY_SCORE: 27
ADLS_ACUITY_SCORE: 61
ADLS_ACUITY_SCORE: 27
ADLS_ACUITY_SCORE: 31
ADLS_ACUITY_SCORE: 31
ADLS_ACUITY_SCORE: 63
ADLS_ACUITY_SCORE: 33
ADLS_ACUITY_SCORE: 27
ADLS_ACUITY_SCORE: 29
ADLS_ACUITY_SCORE: 36
ADLS_ACUITY_SCORE: 38
ADLS_ACUITY_SCORE: 35
ADLS_ACUITY_SCORE: 35
ADLS_ACUITY_SCORE: 31
ADLS_ACUITY_SCORE: 33
ADLS_ACUITY_SCORE: 35
ADLS_ACUITY_SCORE: 33
ADLS_ACUITY_SCORE: 33
ADLS_ACUITY_SCORE: 63
ADLS_ACUITY_SCORE: 35
ADLS_ACUITY_SCORE: 33
ADLS_ACUITY_SCORE: 27
ADLS_ACUITY_SCORE: 67
ADLS_ACUITY_SCORE: 27
ADLS_ACUITY_SCORE: 36
DRESS: 1-->ASSISTANCE (EQUIPMENT/PERSON) NEEDED (NOT DEVELOPMENTALLY APPROPRIATE)
ADLS_ACUITY_SCORE: 35
ADLS_ACUITY_SCORE: 29
ADLS_ACUITY_SCORE: 31
ADLS_ACUITY_SCORE: 31
WALKING_OR_CLIMBING_STAIRS_DIFFICULTY: YES
ADLS_ACUITY_SCORE: 33
ADLS_ACUITY_SCORE: 33
ADLS_ACUITY_SCORE: 30
ADLS_ACUITY_SCORE: 33
ADLS_ACUITY_SCORE: 35
ADLS_ACUITY_SCORE: 63
ADLS_ACUITY_SCORE: 33
ADLS_ACUITY_SCORE: 35
ADLS_ACUITY_SCORE: 38
ADLS_ACUITY_SCORE: 33
ADLS_ACUITY_SCORE: 38
ADLS_ACUITY_SCORE: 12
ADLS_ACUITY_SCORE: 30
ADLS_ACUITY_SCORE: 27
ADLS_ACUITY_SCORE: 38
ADLS_ACUITY_SCORE: 31
ADLS_ACUITY_SCORE: 31
ADLS_ACUITY_SCORE: 35
ADLS_ACUITY_SCORE: 36
ADLS_ACUITY_SCORE: 31
ADLS_ACUITY_SCORE: 36
CONCENTRATING,_REMEMBERING_OR_MAKING_DECISIONS_DIFFICULTY: YES
ADLS_ACUITY_SCORE: 31
ADLS_ACUITY_SCORE: 35
ADLS_ACUITY_SCORE: 23
ADLS_ACUITY_SCORE: 17
ADLS_ACUITY_SCORE: 29
ADLS_ACUITY_SCORE: 33
ADLS_ACUITY_SCORE: 35
EATING: 0-->INDEPENDENT
ADLS_ACUITY_SCORE: 31
ADLS_ACUITY_SCORE: 31
ADLS_ACUITY_SCORE: 36
ADLS_ACUITY_SCORE: 27
ADLS_ACUITY_SCORE: 27
ADLS_ACUITY_SCORE: 35
ADLS_ACUITY_SCORE: 30
DEPENDENT_IADLS:: CLEANING;COOKING;LAUNDRY;SHOPPING;MEDICATION MANAGEMENT;MONEY MANAGEMENT;MEAL PREPARATION;TRANSPORTATION
ADLS_ACUITY_SCORE: 27
ADLS_ACUITY_SCORE: 31
ADLS_ACUITY_SCORE: 27
ADLS_ACUITY_SCORE: 30
ADLS_ACUITY_SCORE: 30
ADLS_ACUITY_SCORE: 33
ADLS_ACUITY_SCORE: 36
ADLS_ACUITY_SCORE: 29
ADLS_ACUITY_SCORE: 33
ADLS_ACUITY_SCORE: 38
SWALLOWING: 0-->SWALLOWS FOODS/LIQUIDS WITHOUT DIFFICULTY (DEVELOPMENTALLY APPROPRIATE)
ADLS_ACUITY_SCORE: 35
ADLS_ACUITY_SCORE: 36
ADLS_ACUITY_SCORE: 31
ADLS_ACUITY_SCORE: 31
TRANSFERRING: 1-->ASSISTANCE (EQUIPMENT/PERSON) NEEDED
ADLS_ACUITY_SCORE: 33
ADLS_ACUITY_SCORE: 29
ADLS_ACUITY_SCORE: 35
ADLS_ACUITY_SCORE: 67
ADLS_ACUITY_SCORE: 35
ADLS_ACUITY_SCORE: 33
ADLS_ACUITY_SCORE: 33
ADLS_ACUITY_SCORE: 30
ADLS_ACUITY_SCORE: 33
EQUIPMENT_CURRENTLY_USED_AT_HOME: CANE, STRAIGHT;WALKER, STANDARD
ADLS_ACUITY_SCORE: 35
ADLS_ACUITY_SCORE: 27
ADLS_ACUITY_SCORE: 33
ADLS_ACUITY_SCORE: 36
ADLS_ACUITY_SCORE: 31
ADLS_ACUITY_SCORE: 67
ADLS_ACUITY_SCORE: 29
TOILETING_ASSISTANCE: TOILETING DIFFICULTY, ASSISTANCE 1 PERSON
ADLS_ACUITY_SCORE: 36
ADLS_ACUITY_SCORE: 33
ADLS_ACUITY_SCORE: 31
ADLS_ACUITY_SCORE: 31
EATING: 0-->INDEPENDENT
ADLS_ACUITY_SCORE: 33
DESCRIBE_HEARING_LOSS: BILATERAL HEARING LOSS
ADLS_ACUITY_SCORE: 31
ADLS_ACUITY_SCORE: 29
ADLS_ACUITY_SCORE: 33
ADLS_ACUITY_SCORE: 31
ADLS_ACUITY_SCORE: 30
ADLS_ACUITY_SCORE: 31
ADLS_ACUITY_SCORE: 36
ADLS_ACUITY_SCORE: 65
ADLS_ACUITY_SCORE: 27
ADLS_ACUITY_SCORE: 31
ADLS_ACUITY_SCORE: 38
ADLS_ACUITY_SCORE: 31
ADLS_ACUITY_SCORE: 33
ADLS_ACUITY_SCORE: 29
ADLS_ACUITY_SCORE: 27
ADLS_ACUITY_SCORE: 30
FALL_HISTORY_WITHIN_LAST_SIX_MONTHS: YES
ADLS_ACUITY_SCORE: 35
ADLS_ACUITY_SCORE: 33
ADLS_ACUITY_SCORE: 31
ADLS_ACUITY_SCORE: 31
ADLS_ACUITY_SCORE: 38
ADLS_ACUITY_SCORE: 38
ADLS_ACUITY_SCORE: 33
ADLS_ACUITY_SCORE: 35
ADLS_ACUITY_SCORE: 33
ADLS_ACUITY_SCORE: 31
ADLS_ACUITY_SCORE: 33
ADLS_ACUITY_SCORE: 35
ADLS_ACUITY_SCORE: 38
ADLS_ACUITY_SCORE: 67
ADLS_ACUITY_SCORE: 27
ADLS_ACUITY_SCORE: 35
ADLS_ACUITY_SCORE: 38
ADLS_ACUITY_SCORE: 29
ADLS_ACUITY_SCORE: 33
ADLS_ACUITY_SCORE: 61
ADLS_ACUITY_SCORE: 36
ADLS_ACUITY_SCORE: 33
ADLS_ACUITY_SCORE: 31
ADLS_ACUITY_SCORE: 33
ADLS_ACUITY_SCORE: 67
ADLS_ACUITY_SCORE: 31
DOING_ERRANDS_INDEPENDENTLY_DIFFICULTY: YES
ADLS_ACUITY_SCORE: 23
ADLS_ACUITY_SCORE: 31
ADLS_ACUITY_SCORE: 33
ADLS_ACUITY_SCORE: 38
ADLS_ACUITY_SCORE: 31
ADLS_ACUITY_SCORE: 38
ADLS_ACUITY_SCORE: 29
ADLS_ACUITY_SCORE: 36
ADLS_ACUITY_SCORE: 33
ADLS_ACUITY_SCORE: 31
ADLS_ACUITY_SCORE: 31
ADLS_ACUITY_SCORE: 33
ADLS_ACUITY_SCORE: 38
ADLS_ACUITY_SCORE: 31
ADLS_ACUITY_SCORE: 33
TRANSFERRING: 1-->ASSISTANCE (EQUIPMENT/PERSON) NEEDED (NOT DEVELOPMENTALLY APPROPRIATE)
ADLS_ACUITY_SCORE: 36
ADLS_ACUITY_SCORE: 29
ADLS_ACUITY_SCORE: 35
ADLS_ACUITY_SCORE: 67
ADLS_ACUITY_SCORE: 33
ADLS_ACUITY_SCORE: 23
ADLS_ACUITY_SCORE: 31
ADLS_ACUITY_SCORE: 35
ADLS_ACUITY_SCORE: 33
ADLS_ACUITY_SCORE: 31
ADLS_ACUITY_SCORE: 35
ADLS_ACUITY_SCORE: 33
ADLS_ACUITY_SCORE: 31
ADLS_ACUITY_SCORE: 27
ADLS_ACUITY_SCORE: 33
ADLS_ACUITY_SCORE: 65
ADLS_ACUITY_SCORE: 36
ADLS_ACUITY_SCORE: 33
ADLS_ACUITY_SCORE: 27
ADLS_ACUITY_SCORE: 31
ADLS_ACUITY_SCORE: 29
ADLS_ACUITY_SCORE: 31
THE_FOLLOWING_AIDS_WERE_PROVIDED;: POCKET TALKER
ADLS_ACUITY_SCORE: 27
ADLS_ACUITY_SCORE: 25
WERE_AUXILIARY_AIDS_OFFERED?: YES
ADLS_ACUITY_SCORE: 31
ADLS_ACUITY_SCORE: 67
ADLS_ACUITY_SCORE: 36
ADLS_ACUITY_SCORE: 33
ADLS_ACUITY_SCORE: 67
ADLS_ACUITY_SCORE: 31
ADLS_ACUITY_SCORE: 36
ADLS_ACUITY_SCORE: 33
ADLS_ACUITY_SCORE: 31
ADLS_ACUITY_SCORE: 33
ADLS_ACUITY_SCORE: 38
ADLS_ACUITY_SCORE: 33
EATING/SWALLOWING: OTHER (SEE COMMENTS)
ADLS_ACUITY_SCORE: 33
DRESSING/BATHING: BATHING DIFFICULTY, ASSISTANCE 1 PERSON;DRESSING DIFFICULTY, ASSISTANCE 1 PERSON
DIFFICULTY_EATING/SWALLOWING: YES
ADLS_ACUITY_SCORE: 33
ADLS_ACUITY_SCORE: 35
ADLS_ACUITY_SCORE: 61
ADLS_ACUITY_SCORE: 31
ADLS_ACUITY_SCORE: 36
ADLS_ACUITY_SCORE: 33
ADLS_ACUITY_SCORE: 33
ADLS_ACUITY_SCORE: 65
ADLS_ACUITY_SCORE: 35
ADLS_ACUITY_SCORE: 31
TOILETING_ISSUES: YES
ADLS_ACUITY_SCORE: 33
ADLS_ACUITY_SCORE: 31
WALKING_OR_CLIMBING_STAIRS: AMBULATION DIFFICULTY, REQUIRES EQUIPMENT;TRANSFERRING DIFFICULTY, REQUIRES EQUIPMENT
SWALLOWING: 0-->SWALLOWS FOODS/LIQUIDS WITHOUT DIFFICULTY
ADLS_ACUITY_SCORE: 31
ADLS_ACUITY_SCORE: 67
ADLS_ACUITY_SCORE: 33
ADLS_ACUITY_SCORE: 33
ADLS_ACUITY_SCORE: 36
ADLS_ACUITY_SCORE: 27
ADLS_ACUITY_SCORE: 33
ADLS_ACUITY_SCORE: 31
ADLS_ACUITY_SCORE: 38
ADLS_ACUITY_SCORE: 33
ADLS_ACUITY_SCORE: 27
ADLS_ACUITY_SCORE: 29
TOILETING: 1-->ASSISTANCE (EQUIPMENT/PERSON) NEEDED
CHANGE_IN_FUNCTIONAL_STATUS_SINCE_ONSET_OF_CURRENT_ILLNESS/INJURY: YES
ADLS_ACUITY_SCORE: 61
ADLS_ACUITY_SCORE: 23
ADLS_ACUITY_SCORE: 33
ADLS_ACUITY_SCORE: 29
ADLS_ACUITY_SCORE: 38
ADLS_ACUITY_SCORE: 33
ADLS_ACUITY_SCORE: 25
ADLS_ACUITY_SCORE: 33
ADLS_ACUITY_SCORE: 67
ADLS_ACUITY_SCORE: 27
ADLS_ACUITY_SCORE: 27
ADLS_ACUITY_SCORE: 31
ADLS_ACUITY_SCORE: 27
ADLS_ACUITY_SCORE: 67
ADLS_ACUITY_SCORE: 35
ADLS_ACUITY_SCORE: 30
ADLS_ACUITY_SCORE: 33
ADLS_ACUITY_SCORE: 35
ADLS_ACUITY_SCORE: 63
ADLS_ACUITY_SCORE: 31
ADLS_ACUITY_SCORE: 35
ADLS_ACUITY_SCORE: 29
ADLS_ACUITY_SCORE: 27
ADLS_ACUITY_SCORE: 35
ADLS_ACUITY_SCORE: 38
ADLS_ACUITY_SCORE: 27
ADLS_ACUITY_SCORE: 33
ADLS_ACUITY_SCORE: 35
ADLS_ACUITY_SCORE: 27
ADLS_ACUITY_SCORE: 33
ADLS_ACUITY_SCORE: 33
PATIENT'S_PREFERRED_MEANS_OF_COMMUNICATION: ENGLISH SPEAKER WITH HEARING LOSS, NO SPEECH PROBLEMS.
ADLS_ACUITY_SCORE: 33
ADLS_ACUITY_SCORE: 27
ADLS_ACUITY_SCORE: 35
DEPENDENT_IADLS:: CLEANING;COOKING;LAUNDRY;SHOPPING;MEDICATION MANAGEMENT;TRANSPORTATION
ADLS_ACUITY_SCORE: 38
ADLS_ACUITY_SCORE: 33
ADLS_ACUITY_SCORE: 31
ADLS_ACUITY_SCORE: 33
ADLS_ACUITY_SCORE: 27
ADLS_ACUITY_SCORE: 36
ADLS_ACUITY_SCORE: 33
ADLS_ACUITY_SCORE: 31
ADLS_ACUITY_SCORE: 33
ADLS_ACUITY_SCORE: 23
ADLS_ACUITY_SCORE: 63
ADLS_ACUITY_SCORE: 29
TOILETING: 1-->ASSISTANCE (EQUIPMENT/PERSON) NEEDED (NOT DEVELOPMENTALLY APPROPRIATE)
ADLS_ACUITY_SCORE: 33
ADLS_ACUITY_SCORE: 35
ADLS_ACUITY_SCORE: 31
ADLS_ACUITY_SCORE: 27
ADLS_ACUITY_SCORE: 63
ADLS_ACUITY_SCORE: 27
ADLS_ACUITY_SCORE: 36
ADLS_ACUITY_SCORE: 33
ADLS_ACUITY_SCORE: 35

## 2022-01-01 ASSESSMENT — ENCOUNTER SYMPTOMS
NAUSEA: 0
NERVOUS/ANXIOUS: 0
COUGH: 0
VOMITING: 0
SHORTNESS OF BREATH: 0
MYALGIAS: 0
SORE THROAT: 0
ABDOMINAL PAIN: 0
DIARRHEA: 0
HEADACHES: 0
DYSURIA: 0
CHILLS: 0
FEVER: 0

## 2022-01-01 ASSESSMENT — MIFFLIN-ST. JEOR: SCORE: 804.69

## 2022-01-05 NOTE — PATIENT INSTRUCTIONS
Urine culture is pending. UA did not look like infection.   The white blood cells are very slightly elevated by monocytes which tend to fight viral illness rather than bacterial.   Continue fluids, diet as tolerated  I will call if the other labs are too abnormal.   Recheck with primary care next week to reassess.   Sooner if worse or new concerns.

## 2022-01-06 NOTE — PROGRESS NOTES
Assessment/Plan:   Midline low back pain without sciatica, unspecified chronicity  Malaise  Confusion  For one week she has increased fatigue and malaise and loss of appetite and now some mild increased confusion for a couple days. Increased complaints of low back pain. Family concerned for UTI. No other changes in medications, living space, illness. No falls or injury.   UA not very impressive but culture is pending. We will treat if indicated. CBC, no anemia, fairly normal WBC. BMP pending. Covid pending.   Suspect a viral illness which is hopefully self limited. No localizing neuro findings make stroke less likely. Consider also other intracranial process, mets, metabolic.  Follow up with primary care next week. To ED if localizing neuro symptoms ,delirium or worse confusion occur.   - UA macro with reflex to Microscopic and Culture - Clinc Collect  - Symptomatic; Unknown COVID-19 Virus (Coronavirus) by PCR Nose  - Urine Culture Aerobic Bacterial  - CBC with platelets and differential  - Basic metabolic panel  (Ca, Cl, CO2, Creat, Gluc, K, Na, BUN)    I discussed red flag symptoms, return precautions to clinic/ER and follow up care with patient/parent.  Expected clinical course, symptomatic cares advised. Questions answered. Patient/parent amenable with plan.    Urine culture is pending. UA did not look like infection.   The white blood cells are very slightly elevated by monocytes which tend to fight viral illness rather than bacterial.   Continue fluids, diet as tolerated  I will call if the other labs are too abnormal.   Recheck with primary care next week to reassess.   Sooner if worse or new concerns.     Subjective:     Nalini Sepulveda is a 86 year old female with dementia, hypertension and history of breast cancer who presents with her daughter for evaluation of a possible UTI. Daughter has noticed that she has been more tired for about a week and seems to complain more often of low back pain. The last  couple days she has seemed more forgetful and confused than her baseline. She has had no appetite and isn't really eating much this week.   No fever, no cough, no runny nose or ST. She denies change in taste or smell but doesn't want to eat because she is not hungry. No N/V/D. No abdominal pain. She denies increased urinary frequency, urgency dysuria or incontinence. She has had UTI before with more of those symptoms.   No chest pain or shortness of breath, no leg swelling or palpitations. No rash.   No recent falls or known head injuries.   No change in medications. She is taking her medications as her kids stop over twice a day to make sure of it and to check on things generally.   No ill contacts.   She has had covid vaccines and booster as well as flu shot this year.      Allergies   Allergen Reactions     Codeine Hives and Itching     Current Outpatient Medications   Medication     acetaminophen (TYLENOL EXTRA STRENGTH) 500 MG tablet     alendronate (FOSAMAX) 70 MG tablet     amLODIPine (NORVASC) 5 MG tablet     atorvastatin (LIPITOR) 20 MG tablet     b complex vitamins tablet     BIOTIN ORAL     cephALEXin (KEFLEX) 500 MG capsule     cholecalciferol, vitamin D3, 1,000 unit tablet     lisinopriL (PRINIVIL,ZESTRIL) 40 MG tablet     metFORMIN (GLUCOPHAGE) 500 MG tablet     metoprolol succinate (TOPROL-XL) 100 MG 24 hr tablet     donepezil (ARICEPT) 5 MG tablet     No current facility-administered medications for this visit.     Patient Active Problem List   Diagnosis     Type 2 diabetes mellitus (H)     Mixed hyperlipidemia     Hypertension     Benign Adenomatous Polyp Of The Large Intestine     Allergic Rhinitis     Adenocarcinoma Of The Breast     S/P mastectomy     Sleep apnea     Osteoporosis without current pathological fracture, unspecified osteoporosis type     Essential tremor     Adenocarcinoma of breast (H)     Dementia without behavioral disturbance (H)       Objective:     BP (!) 160/69   Pulse 79    Temp 98.2  F (36.8  C) (Oral)   Resp 16   Wt 49 kg (108 lb)   SpO2 99%   BMI 21.81 kg/m      Physical    General Appearance: Alert, pleasant and cooperative, no distress. Oriented x 3. AVSS. BP slightly high.   Head: Normocephalic, without obvious abnormality, atraumatic  Eyes: Conjunctivae are normal. Extraocular movements are intact. PERRLA  Nose: No significant congestion.  Throat: Throat is normal.  No exudate.  No vesicular lesions. Lips pink and moist  Neck: No adenopathy  Lungs: Clear to auscultation bilaterally, respirations unlabored  Heart: Regular rate and rhythm  Abdomen: Soft, non-tender, no masses, no organomegaly  Back:no CVA tenderness. Achy across the very low lumbar back  Extremities/neuro: No lower extremity edema. BUE and BLE strength is symmetric. Negative romberg. CN 2-12 grossly intact.   Skin: Skin color, texture, turgor normal, no rashes or lesions  Psychiatric: Patient has a normal mood and affect.     Results for orders placed or performed in visit on 01/05/22   UA macro with reflex to Microscopic and Culture - Clinc Collect     Status: Abnormal    Specimen: Urine, Midstream   Result Value Ref Range    Color Urine Yellow Colorless, Straw, Light Yellow, Yellow    Appearance Urine Clear Clear    Glucose Urine Negative Negative mg/dL    Bilirubin Urine Negative Negative    Ketones Urine Trace (A) Negative mg/dL    Specific Gravity Urine 1.025 1.005 - 1.030    Blood Urine Trace (A) Negative    pH Urine 5.5 5.0 - 8.0    Protein Albumin Urine 100  (A) Negative mg/dL    Urobilinogen Urine 0.2 0.2, 1.0 E.U./dL    Nitrite Urine Negative Negative    Leukocyte Esterase Urine Negative Negative   CBC with platelets and differential     Status: Abnormal   Result Value Ref Range    WBC Count 12.1 (H) 4.0 - 11.0 10e3/uL    RBC Count 3.12 (L) 3.80 - 5.20 10e6/uL    Hemoglobin 10.7 (L) 11.7 - 15.7 g/dL    Hematocrit 31.9 (L) 35.0 - 47.0 %     (H) 78 - 100 fL    MCH 34.3 (H) 26.5 - 33.0 pg    MCHC  33.5 31.5 - 36.5 g/dL    RDW 11.8 10.0 - 15.0 %    Platelet Count 331 150 - 450 10e3/uL    % Neutrophils 63 %    % Lymphocytes 21 %    % Monocytes 15 %    % Eosinophils 1 %    % Basophils 0 %    % Immature Granulocytes 0 %    Absolute Neutrophils 7.6 1.6 - 8.3 10e3/uL    Absolute Lymphocytes 2.5 0.8 - 5.3 10e3/uL    Absolute Monocytes 1.8 (H) 0.0 - 1.3 10e3/uL    Absolute Eosinophils 0.1 0.0 - 0.7 10e3/uL    Absolute Basophils 0.1 0.0 - 0.2 10e3/uL    Absolute Immature Granulocytes 0.0 <=0.4 10e3/uL   CBC with platelets and differential     Status: Abnormal    Narrative    The following orders were created for panel order CBC with platelets and differential.  Procedure                               Abnormality         Status                     ---------                               -----------         ------                     CBC with platelets and d...[[id number]]  Abnormal            Final result                 Please view results for these tests on the individual orders.

## 2022-01-11 PROBLEM — C50.919 ADENOCARCINOMA OF BREAST (H): Status: ACTIVE | Noted: 2022-01-01

## 2022-01-11 PROBLEM — G25.0 ESSENTIAL TREMOR: Status: ACTIVE | Noted: 2022-01-01

## 2022-01-11 PROBLEM — F03.90 DEMENTIA WITHOUT BEHAVIORAL DISTURBANCE (H): Status: ACTIVE | Noted: 2022-01-01

## 2022-01-12 NOTE — PROGRESS NOTES
Assessment & Plan     Dementia without behavioral disturbance, unspecified dementia type (H)  86-year-old woman who is being evaluated with concerns of progressive dementia with confusion and altered mental status.  Daughter is present.  There have been concerns for the past several years including evaluation by neurology in early 2020.  She scored 21/30 on SLUMS evaluation at that time.  There may have been some slow progression of the last year but significant worsening over the past several weeks.  Was evaluated last week for any underlying medical problem that could be contributing to rapid decline.  UTI was suspected but urine culture unremarkable.  She was prescribed Keflex and there may be some mild improvement in symptoms but they are ongoing.  Normal vitamin B12 level in December.  Normal renal function.  Normal thyroid function last year.  No imaging has been completed as of yet.  Daughter who is present is providing most of history.  Nalini is providing minimal input.  I repeated a SLUMS evaluation.  Despite maintaining her attention throughout the test, she could only score 7 out of 30.  Cannot recall any words and could only name 4 animals in 1 minute.  Problems with calculation and clock drawing.  There is clearly a dementia process present.  Presumably Alzheimer's but further work-up is needed.  I am recommending an MRI brain to look for any tumor, hydrocephalus or recent stroke.  The latter may explain recent rapid decline.  We will check further metabolic studies which have either not been completed or have not been performed lately.  Family is looking into moving her to memory care unit and this would seem very appropriate.  She is not safe to live independently at this time.  She is needing help with almost all of her ADLs.  I would recommend starting donepezil 5 mg at bedtime.  Reassess in 3 months.  - Hepatic panel (Albumin, ALT, AST, Bili, Alk Phos, TP)  - CBC with platelets  - TSH  - MR  Brain w/o Contrast  - donepezil (ARICEPT) 5 MG tablet  Dispense: 90 tablet; Refill: 3  - Hepatic panel (Albumin, ALT, AST, Bili, Alk Phos, TP)  - CBC with platelets  - TSH    Type 2 diabetes mellitus without complication, without long-term current use of insulin (H)  Last A1c in December was 6.6% indicating good control of diabetes    Essential tremor  Longstanding tremor previously evaluated by neurology and not thought to be related to Parkinson's or any other neurologic process    Hypertension  Blood pressure looks well controlled with current medication    Osteoporosis without current pathological fracture, unspecified osteoporosis type  It would be reasonable to hold her alendronate as there is uncertainty whether she is able to follow directions and take correctly on her own avoiding lying down for 2 hours after using the medication.  This can be restarted at some time in the future when supervision is better in place              Return in about 3 months (around 4/11/2022) for Follow up.    Ivan Erazo MD  [facility]      48 minutes spent on the date of the encounter doing chart review, history and exam, documentation and further activities per the note    Subjective       HPI 86-year-old who have not previously met is here with her daughter for assessment of worsening cognitive function  See assessment and plan for details of visit    Current Outpatient Medications   Medication     acetaminophen (TYLENOL EXTRA STRENGTH) 500 MG tablet     alendronate (FOSAMAX) 70 MG tablet     amLODIPine (NORVASC) 5 MG tablet     atorvastatin (LIPITOR) 20 MG tablet     b complex vitamins tablet     BIOTIN ORAL     cephALEXin (KEFLEX) 500 MG capsule     cholecalciferol, vitamin D3, 1,000 unit tablet     donepezil (ARICEPT) 5 MG tablet     lisinopriL (PRINIVIL,ZESTRIL) 40 MG tablet     metFORMIN (GLUCOPHAGE) 500 MG tablet     metoprolol succinate (TOPROL-XL) 100 MG 24 hr tablet     No  current facility-administered medications for this visit.        Review of Systems  No headaches no depression        Objective    Vitals:    01/11/22 0922   BP: 128/62   BP Location: Right arm   Patient Position: Sitting   Cuff Size: Adult Regular   Pulse: 80   SpO2: 99%   Weight: 48 kg (105 lb 14.4 oz)        Physical Exam  Elderly woman who is minimally conversant during visit but is pleasant and attentive.  Neurologically appears grossly intact including motor and cranial nerves  SLUMS scoring 7/30

## 2022-01-21 NOTE — TELEPHONE ENCOUNTER
I called Sanaz back and gave her some options for available doctors. She is going to look into it and call back to schedule.  Estela Stevenson CMA ............... 5:09 PM, 01/21/22

## 2022-01-31 NOTE — TELEPHONE ENCOUNTER
Left message for pt daughter to call back. [facility] is not accepting new patients and the patient needs to be rescheduled. Agatha could review the MRI results, but really the patient should establish care with a new provider and review these results. Please assist in scheduling appointment.

## 2022-02-03 PROBLEM — F51.3: Status: ACTIVE | Noted: 2022-01-01

## 2022-02-03 PROBLEM — R63.4 WEIGHT LOSS: Status: ACTIVE | Noted: 2022-01-01

## 2022-02-03 PROBLEM — R62.7 ADULT FAILURE TO THRIVE: Status: ACTIVE | Noted: 2022-01-01

## 2022-02-03 NOTE — PATIENT INSTRUCTIONS
Follow-up for progressive failure to thrive, poor appetite, ongoing weight loss, which I believe is mostly a manifestation of her progressive dementia, but I do see an opportunity to modify her medications to see if that might help.    As of February 3, 2022, reduce Metformin down to 500 mg twice a day (previously at 1000 mg twice a day)  Stop amlodipine because blood pressure seem to be running low  Stop alendronate, risks outweigh benefits    Specifically, today February 3, 2022 I asked Nalini to cut her Metformin dose in half.  Was previously taking 1000 mg twice a day, and I want her to reduce to 500 mg twice a day (using 5 mg tablet, therefore 1 tablet twice a day).    She stopped taking the donepezil as of her visit with Dr. Erazo January 11, 2022.  I reviewed all the test results that he ran, which showed a pretty normal-looking metabolic panel, blood cell counts, thyroid cascade, urinalysis negative for signs of infection.    Nalini is here with her daughter Sanaz, and Sanaz is currently staying with Nalini.  Nalini is on a waiting list for memory care, but they have not heard when a bed might open up.  Therefore and is going to need to stay with her mom  Nalini is in a single-family home, which she is  a two-story home, but Nalini stays on a single floor.    I reminded Sanaz to check the house for tripping and falling hazards.    A wireless panic button might be a useful idea, although I am a little worried about Nalini's ability to actually use it if and when needed. The house is set up with cameras     Dementia, the family has noticed nighttime wandering behaviors  She also probably needs frequent prompting in order to remember to drink to stay hydrated, to eat, and maybe even for voiding  Dementia seems to have progressed, since early 2020 when she scored 21/30 on a cognitive test.  I think she is quite bit more impaired nowadays.  She did not know the date, month, year, or the president, or what building  we are in.    I reminded her daughter Sanaz that for dementia situations like this, it would be best to keep Nalini oriented as much as possible and avoid mixup of daytime and nighttime hours.  Sanaz told me that Nalini might sleep much the day, and that is actually not good for dementia and could lead to sundowning behaviors at night.    Sanaz told me that her siblings have set up cameras inside of Nalini's house, and have caught her wandering in the middle of the night.    Type 2 diabetes mellitus without complication, without long-term current use of insulin (H)  Last A1c in December was 6.6% indicating good control of diabetes     Essential tremor  Longstanding tremor previously evaluated by neurology and not thought to be related to Parkinson's or any other neurologic process     Hypertension, blood pressure may be running a bit on the low side, and she is lost weight, so I will have her stop the amlodipine, for the time being continue on lisinopril and metoprolol, but when she comes back for recheck, may be these could be reduced as well    lisinopriL (PRINIVIL,ZESTRIL) 40 MG tablet  metoprolol succinate (TOPROL-XL) 100 MG 24 hr tablet    STOP amLODIPine (NORVASC) 5 MG tablet--discontinued as a febrile 3/20/2022    Hyperlipidemia, takes atorvastatin 20 mg    Osteoporosis without current pathological fracture, I agree with Dr. Guerrero's suggestion to stop alendronate, because the risks may outweigh the benefits    Immunization History   Administered Date(s) Administered     COVID-19,PF,Pfizer (12+ Yrs) 02/10/2021, 03/03/2021, 10/05/2021     DT (PEDS <7y) 05/01/1993, 05/07/2003     FLU 6-35 months 10/26/2010     Flu, Unspecified 11/13/2007, 11/13/2008, 01/10/2013, 08/17/2016, 10/15/2019     HepA, Unspecified 04/02/2009, 12/14/2009     HepA-Adult 04/02/2009, 12/14/2009     Influenza (High Dose) 3 valent vaccine 10/22/2015, 08/17/2016, 11/01/2017, 10/22/2018     Influenza (IIV3) PF 11/13/2007, 11/13/2008, 09/21/2009,  01/19/2012, 01/10/2013     Influenza Vaccine IM > 6 months Valent IIV4 (Alfuria,Fluzone) 10/10/2019     Influenza Vaccine, 6+MO IM (QUADRIVALENT W/PRESERVATIVES) 09/21/2009, 01/19/2012     Influenza, Quad, High Dose, Pf, 65yr+ (Fluzone HD) 10/20/2020, 10/05/2021     Pneumo Conj 13-V (2010&after) 04/23/2015     Pneumococcal 23 valent 04/19/2004     Td (Adult), Adsorbed 05/07/2003     Td,adult,historic,unspecified 05/07/2003     Tdap (Adacel,Boostrix) 01/10/2013

## 2022-02-03 NOTE — PROGRESS NOTES
Office Visit - Follow Up   Nalini Sepulveda   86 year old female    Date of Visit: 2/3/2022    Chief Complaint   Patient presents with     Fatigue        -------------------------------------------------------------------------------------------------------------------------  Assessment and Plan    Follow-up for progressive failure to thrive, poor appetite, ongoing weight loss, which I believe is mostly a manifestation of her progressive dementia, but I do see an opportunity to modify her medications to see if that might help.    As of February 3, 2022, reduce Metformin down to 500 mg twice a day (previously at 1000 mg twice a day)  Stop amlodipine because blood pressure seem to be running low  Stop alendronate, risks outweigh benefits    Specifically, today February 3, 2022 I asked Nalini to cut her Metformin dose in half.  Was previously taking 1000 mg twice a day, and I want her to reduce to 500 mg twice a day (using 5 mg tablet, therefore 1 tablet twice a day).    She stopped taking the donepezil as of her visit with Dr. Erazo January 11, 2022.  I reviewed all the test results that he ran, which showed a pretty normal-looking metabolic panel, blood cell counts, thyroid cascade, urinalysis negative for signs of infection.    Nalini is here with her daughter Sanaz, and Sanaz is currently staying with Nalini.  Nalini is on a waiting list for memory care, but they have not heard when a bed might open up.  Therefore and is going to need to stay with her mom  Nalini is in a single-family home, which she is  a two-story home, but Nalini stays on a single floor.    I reminded Sanaz to check the house for tripping and falling hazards.    A wireless panic button might be a useful idea, although I am a little worried about Nalini's ability to actually use it if and when needed. The house is set up with cameras     Dementia, the family has noticed nighttime wandering behaviors  She also probably needs frequent prompting in  order to remember to drink to stay hydrated, to eat, and maybe even for voiding  Dementia seems to have progressed, since early 2020 when she scored 21/30 on a cognitive test.  I think she is quite bit more impaired nowadays.  She did not know the date, month, year, or the president, or what building we are in.    I reminded her daughter Sanaz that for dementia situations like this, it would be best to keep Nalini oriented as much as possible and avoid mixup of daytime and nighttime hours.  Sanaz told me that Nalini might sleep much the day, and that is actually not good for dementia and could lead to sundowning behaviors at night.    Sanaz told me that her siblings have set up cameras inside of Nalini's house, and have caught her wandering in the middle of the night.    Type 2 diabetes mellitus without complication, without long-term current use of insulin (H)  Last A1c in December was 6.6% indicating good control of diabetes     Essential tremor  Longstanding tremor previously evaluated by neurology and not thought to be related to Parkinson's or any other neurologic process     Hypertension, blood pressure may be running a bit on the low side, and she is lost weight, so I will have her stop the amlodipine, for the time being continue on lisinopril and metoprolol, but when she comes back for recheck, may be these could be reduced as well    lisinopriL (PRINIVIL,ZESTRIL) 40 MG tablet  metoprolol succinate (TOPROL-XL) 100 MG 24 hr tablet    STOP amLODIPine (NORVASC) 5 MG tablet--discontinued as a febrile 3/20/2022    Hyperlipidemia, takes atorvastatin 20 mg    Osteoporosis without current pathological fracture, I agree with Dr. Guerrero's suggestion to stop alendronate, because the risks may outweigh the benefits    Immunization History   Administered Date(s) Administered     COVID-19,PF,Pfizer (12+ Yrs) 02/10/2021, 03/03/2021, 10/05/2021     DT (PEDS <7y) 05/01/1993, 05/07/2003     FLU 6-35 months 10/26/2010     Flu,  Unspecified 11/13/2007, 11/13/2008, 01/10/2013, 08/17/2016, 10/15/2019     HepA, Unspecified 04/02/2009, 12/14/2009     HepA-Adult 04/02/2009, 12/14/2009     Influenza (High Dose) 3 valent vaccine 10/22/2015, 08/17/2016, 11/01/2017, 10/22/2018     Influenza (IIV3) PF 11/13/2007, 11/13/2008, 09/21/2009, 01/19/2012, 01/10/2013     Influenza Vaccine IM > 6 months Valent IIV4 (Alfuria,Fluzone) 10/10/2019     Influenza Vaccine, 6+MO IM (QUADRIVALENT W/PRESERVATIVES) 09/21/2009, 01/19/2012     Influenza, Quad, High Dose, Pf, 65yr+ (Fluzone HD) 10/20/2020, 10/05/2021     Pneumo Conj 13-V (2010&after) 04/23/2015     Pneumococcal 23 valent 04/19/2004     Td (Adult), Adsorbed 05/07/2003     Td,adult,historic,unspecified 05/07/2003     Tdap (Adacel,Boostrix) 01/10/2013           --------------------------------------------------------------------------------------------------------------------------  History of Present Illness  This 86 year old old     Follow-up for progressive failure to thrive, poor appetite, ongoing weight loss, which I believe is mostly a manifestation of her progressive dementia, but I do see an opportunity to modify her medications to see if that might help.    Dementia without behavioral disturbance  concerns of progressive dementia with confusion and altered mental status.  Daughter is present.  There have been concerns for the past several years including evaluation by neurology in early 2020.  She scored 21/30 on SLUMS evaluation at that time.  There may have been some slow progression of the last year but significant worsening over the past several weeks.      Was evaluated last week for any underlying medical problem that could be contributing to rapid decline.  UTI was suspected but urine culture unremarkable.  She was prescribed Keflex and there may be some mild improvement in symptoms but they are ongoing.      Wt Readings from Last 5 Encounters:   02/03/22 44.9 kg (99 lb)   01/11/22 48 kg (105  lb 14.4 oz)   01/05/22 49 kg (108 lb)   12/01/21 49.9 kg (110 lb)   06/02/21 46.7 kg (103 lb)     Normal vitamin B12 level in December.  Normal renal function.  Normal thyroid function last year.  No imaging has been completed as of yet.  Daughter who is present is providing most of history.      Nalini is providing minimal input.  I repeated a SLUMS evaluation.  Despite maintaining her attention throughout the test, she could only score 7 out of 30.  Cannot recall any words and could only name 4 animals in 1 minute.  Problems with calculation and clock drawing.  There is clearly a dementia process present.  Presumably Alzheimer's but further work-up is needed.  I am recommending an MRI brain to look for any tumor, hydrocephalus or recent stroke.  The latter may explain recent rapid decline.  We will check further metabolic studies which have either not been completed or have not been performed lately.  Family is looking into moving her to memory care unit and this would seem very appropriate.  She is not safe to live independently at this time.  She is needing help with almost all of her ADLs.  I would recommend starting donepezil 5 mg at bedtime.  Reassess in 3 months.       Type 2 diabetes mellitus without complication, without long-term current use of insulin (H)  Last A1c in December was 6.6% indicating good control of diabetes     Essential tremor  Longstanding tremor previously evaluated by neurology and not thought to be related to Parkinson's or any other neurologic process     Hypertension  Blood pressure looks well controlled with current medication     Osteoporosis without current pathological fracture, unspecified osteoporosis type  It would be reasonable to hold her alendronate as there is uncertainty whether she is able to follow directions and take correctly on her own avoiding lying down for 2 hours after using the medication.  This can be restarted at some time in the future when supervision is  better in place     Wt Readings from Last 3 Encounters:   02/03/22 44.9 kg (99 lb)   01/11/22 48 kg (105 lb 14.4 oz)   01/05/22 49 kg (108 lb)     BP Readings from Last 3 Encounters:   02/03/22 116/50   01/11/22 128/62   01/05/22 (!) 160/69       ---------------------------------------------------------------------------------------------------------------------------    Medications, Allergies, Social, and Problem List   Current Outpatient Medications   Medication Sig Dispense Refill     acetaminophen (TYLENOL EXTRA STRENGTH) 500 MG tablet [ACETAMINOPHEN (TYLENOL EXTRA STRENGTH) 500 MG TABLET] Take 2 tablets (1,000 mg total) by mouth 2 (two) times a day. 100 tablet 2     atorvastatin (LIPITOR) 20 MG tablet [ATORVASTATIN (LIPITOR) 20 MG TABLET] Take 1 tablet (20 mg total) by mouth daily. 90 tablet 3     b complex vitamins tablet [B COMPLEX VITAMINS TABLET] Take 1 tablet by mouth daily.       BIOTIN ORAL [BIOTIN ORAL] Take 1 tablet by mouth daily.       cholecalciferol, vitamin D3, 1,000 unit tablet [CHOLECALCIFEROL, VITAMIN D3, 1,000 UNIT TABLET] Take 2,000 Units by mouth daily.       lisinopriL (PRINIVIL,ZESTRIL) 40 MG tablet [LISINOPRIL (PRINIVIL,ZESTRIL) 40 MG TABLET] Take 1 tablet (40 mg total) by mouth daily. 90 tablet 3     metFORMIN (GLUCOPHAGE) 500 MG tablet Take 1 tablet (500 mg) by mouth 2 times daily (with meals)       metoprolol succinate (TOPROL-XL) 100 MG 24 hr tablet [METOPROLOL SUCCINATE (TOPROL-XL) 100 MG 24 HR TABLET] Take 1 tablet (100 mg total) by mouth daily. 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    /50 (BP Location: Right arm, Patient Position: Sitting, Cuff Size: Adult Small)   Pulse 84   Temp 97.5  F (36.4  C) (Oral)   Ht 1.499 m (4' 11\")   Wt 44.9 kg (99 lb)   SpO2 97%   BMI 20.00 kg/m      Lungs clear  Heart regular rate rhythm  Abdomen nontender       Additional Information   I spent 30 minutes on this encounter, including reviewing interval history since last visit, examining the patient, explaining and counseling the issues enumerated in the Assessment and Plan (patient given a copy)       CHANTELLE TAVERAS MD, MD    "

## 2022-02-07 NOTE — TELEPHONE ENCOUNTER
Reason for Call: patients daughter is calling to request an evaluation for possible home care services vs. Nursing home placement. Could be hospice? Daughter is unsure of what level of care would be required. She is unable to provide all of the care she requires.  May benefit from a social work consult.    Detailed comments: please call Sanaz back to advise. Patient last seen 2/3/22.    Phone Number Patient can be reached at: Cell number on file:    Telephone Information:   Mobile [phone]       Best Time: any    Can we leave a detailed message on this number? YES    Call taken on 2/7/2022 at 10:14 AM by Bess Funk

## 2022-02-08 NOTE — TELEPHONE ENCOUNTER
I called to speak with daughter Sanaz. She states pt continues to decline/change daily and not sure if she can manage the pt any longer at home and doesn't think Nalini is a good fit for Memory care any longer, which was the plan and they were waiting for a bed. The daughter is asking if there is a possible way to have Home care or whoever come to the home and do an assessment to see what the plan for the pt should be, and what the next steps for her should be. Please advise. Pt was last seen face to face 2/3/22.

## 2022-02-08 NOTE — TELEPHONE ENCOUNTER
Please call Sanaz and tell her that I requested a consultation from [facility] Care Coordination.  This is an interdisciplinary team that includes social workers and nurses.  I believe Care Coordination would be very helpful in helping Sanaz define goals and navigate options for Ms. Sepulveda' long-term care, including home-based care versus institutional options.

## 2022-02-09 NOTE — PROGRESS NOTES
Clinic Care Coordination Contact  Community Health Worker Initial Outreach    CHW Initial Information Gathering:  Referral Source: PCP  Preferred Hospital: [facility]  [phone]  Current living arrangement:: I live in a private home,I live alone  Type of residence:: Private home - stairs (Stairs in and out of home)  Community Resources: None  Supplies used at home:: Incontinence Supplies  Equipment Currently Used at Home: cane, quad,cane, straight,walker, rolling,tub bench  Informal Support system:: Children  Transportation means:: Family  CHW Additional Questions  [place] active?: Yes  Patient sent Social Determinants of Health questionnaire?: No    Patient accepts CC: Yes. Patient scheduled for assessment with MUKUND Hull on 2/11/22 at 3:00. Patient noted desire to discuss assistance with looking into long term care homes, help in the home and support with medical conditions. .     ** For MUKUND Assessment call patients daughter Sanaz @ [phone]    ** CHW sent Social Determinants of Health Questionnaire through [employer].    Sarina Welch  Community Health Worker  [facility]  Clinic Care Coordination   Office: [phone]

## 2022-02-11 NOTE — LETTER
"[facility] CARE COORDINATION  [address] [place] [address]    February 14, 2022    Nalini Sepulveda  [address] [place] [address]      Dear Nalini,    I am a clinic care coordinator who works with Betsy Mullen MD. I wanted to thank you for spending the time to talk with me.  Below  are the resources we discussed during our call.    Meal Delivery Services:    [facility] Meals on Wheels  [phone]  [facility]    [employer]  [phone]  Www[email or web address]    Mom's Meals   [phone]  Www.[employer]    Private Pay Caregivers:    [employer]   [phone]  Www.[employer]    [employer]   [phone]  Www.[employer]    Home Instead  [phone]  Www.homeinstead.[employer]            [facility]   look into other assisted living  Www.careoptionsnetwork.org  Select \"Helpful Info\"  Select \"AL-NE\" or \"AL-NW\" for geographic area  Or Select \"Memory care grid\"    Do not hesitate to contact me at [phone] or Sarina the Community Health Worker at [phone] with any questions or concerns. We are focused on providing you with the highest-quality healthcare experience possible and that all starts with you.     Sincerely,     Della  Care Coordination Team    Enclosed: I have enclosed a copy of the Patient Centered Plan of Care. This has helpful information and goals that we have talked about. Please keep this in an easy to access place to use as needed.    "

## 2022-02-11 NOTE — LETTER
[facility]  Patient Centered Plan of Care  About Me:        Patient Name:  Nalini Ascencio    YOB: 1935  Age:         86 year old   [place] MRN:    [id number] Telephone Information:  Home Phone [phone]   Mobile [phone]       Address:  [address] [place] [address] Email address:  [email or web address]      Emergency Contact(s)    Name Relationship Lgl Grd Work Phone Home Phone Mobile Phone   1. MADHAVI BONILLA Daughter No  [phone]   2. CORINA ASCENCIO Son No  [phone]            Primary language:  English     needed? No   [employer]:  [phone] op. 1  Other communication barriers:Cognitive impairment  Current living arrangement: I live in a private home; I live alone  Mobility Status/ Medical Equipment: Independent    Health Maintenance  Health Maintenance Reviewed: Up to date      My Access Plan  Medical Emergency 911   Primary Clinic Line [facility] Neurology [facility] [phone]   24 Hour Appointment Line [phone] or  [phone]) (toll-free)   24 Hour Nurse Line [phone] (toll-free)   Preferred Urgent Care [facility] [phone]     [facility]  [phone]     Preferred Pharmacy [facility] [address] [place] [facility] [place], [place] - [address] [place] [address]     Behavioral Health Crisis Line The National Suicide Prevention Lifeline at 1-772.566.9084 or 911             My Care Team Members  Patient Care Team       Relationship Specialty Notifications Start End    Betsy Mullen MD PCP - General Internal Medicine  6/2/21     Phone: [phone] Fax: [phone]         [facility] [place] [address]    Betsy Mullen MD Assigned PCP   6/16/21     Phone: [phone] Fax: [phone]         [address] [place] [address]    Della Silva LSW Lead Care Coordinator  Admissions 2/11/22     Rebekah  Sarina VELOZ Community Health Worker  Admissions 2/11/22             My Care Plans  Self Management and Treatment Plan  Goals and (Comments)  Goals        General     Psychosocial (pt-stated)      Notes - Note created  2/14/2022  9:37 AM by Della Silva LSW     Goal Statement: My daughter will look into resources for help at home and assisted living facilities     Date Goal set: 2/11/22  Barriers: none  Strengths: long term care policy, family  Date to Achieve By: 6 months  Patient expressed understanding of goal: yes    Action steps to achieve this goal:  1. My daughter will contact [facility] to discuss long term care policy   2. My daughter will keep in contact with [facility]  3. My daughter will research other facilities using [employer]  4. I will trial a meal delivery service  5. I will use private pay care givers if needed until I move              Action Plans on File:                       Advance Care Plans/Directives Type: On file    My Medical and Care Information  Problem List   Patient Active Problem List   Diagnosis     Type 2 diabetes mellitus without complication, unspecified whether long term insulin use (H)     Mixed hyperlipidemia     Hypertension     Benign Adenomatous Polyp Of The Large Intestine     Allergic Rhinitis     Adenocarcinoma Of The Breast     S/P mastectomy     Sleep apnea     Osteoporosis without current pathological fracture, unspecified osteoporosis type     Essential tremor     Adenocarcinoma of breast (H)     Dementia without behavioral disturbance (H)     Adult failure to thrive     Nocturnal wandering     Weight loss      Current Medications and Allergies:  See printed Medication Report.    Care Coordination Start Date: 2/11/2022   Frequency of Care Coordination: monthly     Form Last Updated: 02/14/2022

## 2022-02-14 NOTE — PROGRESS NOTES
"Clinic Care Coordination Contact    Clinic Care Coordination Contact  OUTREACH    Referral Information:  Referral Source: PCP    Primary Diagnosis: Psychosocial    Chief Complaint   Patient presents with     Clinic Care Coordination - Initial        Universal Utilization:   Clinic Utilization  Difficulty keeping appointments:: No  Compliance Concerns: No  No-Show Concerns: No  No PCP office visit in Past Year: No  Utilization    Hospital Admissions  0             ED Visits  0             No Show Count (past year)  0                Current as of: 2/11/2022  6:52 PM              Clinical Concerns:  Current Medical Concerns:  Dementia, diabetes, hypertension   Current Behavioral Concerns: memory loss      Education Provided to patient:      Meal Delivery Services:    [facility] Meals on Wheels  [phone]  [facility]  [phone]  [employer]   [phone]  [email or web address]    Private Pay Caregivers:    [employer]   [phone]  [email or web address]    [employer]   [phone]  [email or web address]    [employer]  [phone]  [email or web address]    [facility]   look into other assisted living  [email or web address]  Select \"Helpful Info\"  Select \"AL-NE\" or \"AL-NW\" for geographic area  Or Select \"Memory care grid\"    Health Maintenance Reviewed: Up to date      Medication Management:  Medication review status: Not discussed due to nature of call     Living Situation: Pt on waitlist at [facility]  Current living arrangement:: I live in a private home,I live alone  Type of residence:: Private home - stairs (Stairs in and out of home)    Lifestyle & Psychosocial Needs:    Social Determinants of Health     Tobacco Use: Low Risk      Smoking Tobacco Use: Never Smoker     Smokeless Tobacco Use: Never Used   Alcohol Use: Not At Risk     Frequency of Alcohol Consumption: Never     Average Number of Drinks: Patient refused     Frequency of Binge " Drinking: Never   Financial Resource Strain: Low Risk      Difficulty of Paying Living Expenses: Not very hard   Food Insecurity: No Food Insecurity     Worried About Running Out of Food in the Last Year: Never true     Ran Out of Food in the Last Year: Never true   Transportation Needs: No Transportation Needs     Lack of Transportation (Medical): No     Lack of Transportation (Non-Medical): No   Physical Activity: Inactive     Days of Exercise per Week: 0 days     Minutes of Exercise per Session: 0 min   Stress: No Stress Concern Present     Feeling of Stress : Not at all   Social Connections: Socially Isolated     Frequency of Communication with Friends and Family: More than three times a week     Frequency of Social Gatherings with Friends and Family: Never     Attends Temple Services: Never     Active Member of Clubs or Organizations: No     Attends Club or Organization Meetings: Never     Marital Status:    Intimate Partner Violence: Not At Risk     Fear of Current or Ex-Partner: No     Emotionally Abused: No     Physically Abused: No     Sexually Abused: No   Depression: At risk     PHQ-2 Score: 3   Housing Stability: Low Risk      Unable to Pay for Housing in the Last Year: No     Number of Places Lived in the Last Year: 1     Unstable Housing in the Last Year: No     Diet:: Regular  Inadequate nutrition (GOAL):: No  Tube Feeding: No  Inadequate activity/exercise (GOAL):: No  Significant changes in sleep pattern (GOAL): No  Transportation means:: Family     Informal Support system:: Children        Resources and Interventions:  Current Resources:      Community Resources: None  Supplies used at home:: Incontinence Supplies  Equipment Currently Used at Home: cane, quad,cane, straight,walker, rolling,tub bench     Goals:   Goals        General     Psychosocial (pt-stated)      Notes - Note created  2/14/2022  9:37 AM by Della Silva LSW     Goal Statement: My daughter will look into resources for  help at home and assisted living facilities     Date Goal set: 2/11/22  Barriers: none  Strengths: long term care policy, family  Date to Achieve By: 6 months  Patient expressed understanding of goal: yes    Action steps to achieve this goal:  1. My daughter will contact [facility] to discuss long term care policy   2. My daughter will keep in contact with [facility]  3. My daughter will research other facilities using [facility]  4. I will trial a meal delivery service  5. I will use private pay care givers if needed until I move             Patient/Caregiver understanding: Pt agreeable to follow up from CC team     Outreach Frequency: monthly  Future Appointments              In 2 weeks Lisa Martin CNP [facility] TAMARA [facility]          Plan: CC SW to follow up in one month, CHW set outreach for 6 weeks

## 2022-02-28 NOTE — PROGRESS NOTES
Clinic Note    Assessment:     Assessment and Plan:  1. Encounter to establish care: Care established today.     2. Dementia without behavioral disturbance, unspecified dementia type (H): Known dementia. She continues to live independently in her home. Family stops in twice daily. They also have cameras to watch her during the day. She is on a waiting list for memory care.     3. Primary osteoarthritis of both knees: Bilateral knee crepitus. Worsening pain in knees, decreased mobility. Will send to Orthopedics for knee injections. Continue to use cane.   - Orthopedic  Referral; Future    4. Essential tremor: hx of this, hands. Stable.     5. Hypertension: Blood pressure was elevated today at 150/60; 160/72. She continues on Metoprolol, Lisinopril. Will not add additional medication at this time as she ate salty [employer] tonight. Continue to watch salt intake at home. Follow up if blood pressure remains elevated.     6. Type 2 diabetes mellitus without complication, unspecified whether long term insulin use (H): She is on Metformin one tablet twice daily (500mg). Her last A1C was 6.6% 12/01/21. Will recheck labs at follow up. Stable.        Patient Instructions   Rest, Heat 15 minutes four times per day to the knees. You could also try icy hot or biofreeze rubbed in to the knees.    Tylenol as needed for pain control.    Call [place] orth[facility] to set up the knee injections by calling [phone].    I will see you back in summer time for follow up, before then if anything comes up.     Return in about 4 months (around 6/28/2022) for Follow up.         Subjective:      Nalini Sepulveda is a 86 year old female presents today with her daughter Sanaz to establish care.    She previously was a patient of Dr. Mullen.    She reports that the patient overall is doing much better. She is back to her baseline like in December.    In January/February she had a decline in her memory and increased fatigue. She was taken off  of her norvasc and decreased her metformin to 500mg twice daily.    Since this time, her swelling in her legs has decreased. She has started to feel much better.    The patient continues to live in a two story home independently. Family checks in on her twice daily to ensure she is eating, and her dog is fed.    She is on a waiting list for a memory care unit, they are unsure when she will move.    Sanaz reports that the patient has had increased difficulty getting in and out of her home as she has stairs she needs to ascend and descend. She previously 4-5 years ago had cortisone injections into her knees to help with her arthritic pain, they would like to do this again.    She continues to be unsteady on her feet, balance is poor. She continues to use a cane at home, but oftentimes displaces it. She does have a walker at home, but her house is very small, and she gets hung up on things easily.    Sanaz reports that her brother brought the patient [employer] a cheeseburger with fries, and likely this is why her blood pressure is elevated today. She has cut back on her chip intake.    The following portions of the patient's history were reviewed and updated as appropriate.    Review of Systems:    Review is otherwise negative except for what is mentioned above.     Social Hx:    History   Smoking Status     Never Smoker   Smokeless Tobacco     Never Used         Objective:     Vitals:    02/28/22 1048 02/28/22 1108   BP: (!) 150/60 (!) 160/72   BP Location: Right arm Right arm   Patient Position: Sitting Sitting   Cuff Size: Adult Regular Adult Regular   Pulse: 76    SpO2: 97%    Weight: 47.2 kg (104 lb)        Exam:  General: No apparent distress. Calm. Alert and Oriented to person, place, not to time. Pt behavior is appropriate.  Head:Atraumatic. Normocephalic.  Chest/Lungs: Clear to auscultation, normal respiratory effort and rate.   Heart/Pulses: Regular rate and rhythm, no murmurs, gallops, or rubs. Capillary refill  <2 seconds. No edema.   Musculoskeletal: Bilateral crepitus in knees, slow going from seated to standing position, balance is poor.   Neurologic: Interactive, alert, no focal findings.  Skin: Warm, dry. Normal skin turgor.       Patient Active Problem List   Diagnosis     Type 2 diabetes mellitus without complication, unspecified whether long term insulin use (H)     Mixed hyperlipidemia     Hypertension     Benign Adenomatous Polyp Of The Large Intestine     Allergic Rhinitis     Adenocarcinoma Of The Breast     S/P mastectomy     Sleep apnea     Osteoporosis without current pathological fracture, unspecified osteoporosis type     Essential tremor     Adenocarcinoma of breast (H)     Dementia without behavioral disturbance (H)     Adult failure to thrive     Nocturnal wandering     Weight loss     Current Outpatient Medications   Medication Sig Dispense Refill     acetaminophen (TYLENOL EXTRA STRENGTH) 500 MG tablet [ACETAMINOPHEN (TYLENOL EXTRA STRENGTH) 500 MG TABLET] Take 2 tablets (1,000 mg total) by mouth 2 (two) times a day. 100 tablet 2     atorvastatin (LIPITOR) 20 MG tablet [ATORVASTATIN (LIPITOR) 20 MG TABLET] Take 1 tablet (20 mg total) by mouth daily. 90 tablet 3     b complex vitamins tablet [B COMPLEX VITAMINS TABLET] Take 1 tablet by mouth daily.       BIOTIN ORAL [BIOTIN ORAL] Take 1 tablet by mouth daily.       cholecalciferol, vitamin D3, 1,000 unit tablet [CHOLECALCIFEROL, VITAMIN D3, 1,000 UNIT TABLET] Take 2,000 Units by mouth daily.       lisinopriL (PRINIVIL,ZESTRIL) 40 MG tablet [LISINOPRIL (PRINIVIL,ZESTRIL) 40 MG TABLET] Take 1 tablet (40 mg total) by mouth daily. 90 tablet 3     metFORMIN (GLUCOPHAGE) 500 MG tablet Take 1 tablet (500 mg) by mouth 2 times daily (with meals)       metoprolol succinate (TOPROL-XL) 100 MG 24 hr tablet [METOPROLOL SUCCINATE (TOPROL-XL) 100 MG 24 HR TABLET] Take 1 tablet (100 mg total) by mouth daily. 90 tablet 3     Lisa Martin Adult-Geriatric Nurse  Practitioner  [facility] - Internal Medicine Team     2/28/2022         Answers for HPI/ROS submitted by the patient on 2/28/2022  How many servings of fruits and vegetables do you eat daily?: 2-3  On average, how many sweetened beverages do you drink each day (Examples: soda, juice, sweet tea, etc.  Do NOT count diet or artificially sweetened beverages)?: 1  How many minutes a day do you exercise enough to make your heart beat faster?: 9 or less  How many days a week do you exercise enough to make your heart beat faster?: 3 or less  How many days per week do you miss taking your medication?: 0  What is the reason for your visit today?: Establish care

## 2022-02-28 NOTE — PATIENT INSTRUCTIONS
Rest, Heat 15 minutes four times per day to the knees. You could also try icy hot or biofreeze rubbed in to the knees.    Tylenol as needed for pain control.    Call [place] [facility] to set up the knee injections by calling [phone].    I will see you back in summer time for follow up, before then if anything comes up.

## 2022-03-21 NOTE — PROGRESS NOTES
Clinic Care Coordination Contact    Follow Up Progress Note      Assessment: SEB DUVAL spoke with pts daughter for update on transition to memory care. Plan is early May for pt to transition to [facility]. Advised daughter to contact clinic at that time if needed for records to be faxed to [facility]. Daughter says they are managing meals and care until pt moves.    Care Gaps:    Health Maintenance Due   Topic Date Due     EYE EXAM  Never done     MAMMO SCREENING  01/02/2021     LIPID  02/27/2021     MICROALBUMIN  02/27/2021       Care Gaps Last addressed on 2/28/22    Goals addressed this encounter:   Goals Addressed                    This Visit's Progress       Psychosocial (pt-stated)         Goal Statement: My daughter will look into resources for help at home and assisted living facilities     Date Goal set: 2/11/22  Barriers: none  Strengths: long term care policy, family  Date to Achieve By: 6 months  Patient expressed understanding of goal: yes    Action steps to achieve this goal:  1. My daughter will contact [facility] to discuss long term care policy   2. My daughter will keep in contact with [facility] admissions (plan is early May)  3. I will trial a meal delivery service- not interested  4. I will use private pay care givers if needed until I move - not needed    Updated- 3/21/22                   Outreach Frequency: 6 weeks        Plan:     Care Coordinator will follow up in approximately 6 weeks with daughter around transition to memory care.  No CHW outreach needed.

## 2022-04-03 NOTE — TELEPHONE ENCOUNTER
"Routing refill request to provider for review/approval because:  Incomplete prescription, no amount or refills    Last Written Prescription Date:  2/3/2022  Last Fill Quantity: NA,  # refills: NA   Last office visit provider:  2/28/2022     Requested Prescriptions   Pending Prescriptions Disp Refills     metFORMIN (GLUCOPHAGE) 500 MG tablet [Pharmacy Med Name: METFORMIN  MG TABLET] 360 tablet 1     Sig: TAKE 2 TABLETS BY MOUTH 2 TIMES A DAY WITH MEALS.       Biguanide Agents Passed - 3/31/2022  2:52 PM        Passed - Patient is age 10 or older        Passed - Patient has documented A1c within the specified period of time.     If HgbA1C is 8 or greater, it needs to be on file within the past 3 months.  If less than 8, must be on file within the past 6 months.     Recent Labs   Lab Test 12/01/21  1235   A1C 6.6*             Passed - Patient's CR is NOT>1.4 OR Patient's EGFR is NOT<45 within past 12 mos.     Recent Labs   Lab Test 01/05/22  1658 06/02/21  1415   GFRESTIMATED 66 >60   GFRESTBLACK  --  >60       Recent Labs   Lab Test 01/05/22  1658   CR 0.86             Passed - Patient does NOT have a diagnosis of CHF.        Passed - Medication is active on med list        Passed - Patient is not pregnant        Passed - Patient has not had a positive pregnancy test within the past 12 mos.         Passed - Recent (6 mo) or future (30 days) visit within the authorizing provider's specialty     Patient had office visit in the last 6 months or has a visit in the next 30 days with authorizing provider or within the authorizing provider's specialty.  See \"Patient Info\" tab in inbasket, or \"Choose Columns\" in Meds & Orders section of the refill encounter.                 Shana Edge RN 04/02/22 11:29 PM  "

## 2022-04-26 PROBLEM — M62.82 RHABDOMYOLYSIS: Status: ACTIVE | Noted: 2022-01-01

## 2022-04-26 NOTE — PHARMACY-ADMISSION MEDICATION HISTORY
Pharmacy Note - Admission Medication History    Pertinent Provider Information: Per family, pt could only swallow metoprolol this AM.      ______________________________________________________________________    Prior To Admission (PTA) med list completed and updated in EMR.       PTA Med List   Medication Sig Last Dose     acetaminophen (TYLENOL EXTRA STRENGTH) 500 MG tablet [ACETAMINOPHEN (TYLENOL EXTRA STRENGTH) 500 MG TABLET] Take 2 tablets (1,000 mg total) by mouth 2 (two) times a day. 4/25/2022 at PM     atorvastatin (LIPITOR) 20 MG tablet [ATORVASTATIN (LIPITOR) 20 MG TABLET] Take 1 tablet (20 mg total) by mouth daily. 4/25/2022 at AM     b complex vitamins tablet [B COMPLEX VITAMINS TABLET] Take 1 tablet by mouth daily. 4/25/2022 at AM     BIOTIN ORAL [BIOTIN ORAL] Take 1 tablet by mouth daily. 4/25/2022 at AM     cholecalciferol, vitamin D3, 1,000 unit tablet [CHOLECALCIFEROL, VITAMIN D3, 1,000 UNIT TABLET] Take 2,000 Units by mouth daily. 4/25/2022 at AM     lisinopriL (PRINIVIL,ZESTRIL) 40 MG tablet [LISINOPRIL (PRINIVIL,ZESTRIL) 40 MG TABLET] Take 1 tablet (40 mg total) by mouth daily. 4/25/2022 at AM     metFORMIN (GLUCOPHAGE) 500 MG tablet TAKE 2 TABLETS BY MOUTH 2 TIMES A DAY WITH MEALS. (Patient taking differently: Take 500 mg by mouth 2 times daily (with meals)) 4/25/2022 at PM     metoprolol succinate (TOPROL-XL) 100 MG 24 hr tablet [METOPROLOL SUCCINATE (TOPROL-XL) 100 MG 24 HR TABLET] Take 1 tablet (100 mg total) by mouth daily. 4/26/2022 at AM       Information source(s): Family member and CareEverywhere/SureScripts  Method of interview communication: in-person    Summary of Changes to PTA Med List  New: N/A  Discontinued: N/A  Changed: N/A    Patient was asked about OTC/herbal products specifically.  PTA med list reflects this.    In the past week, patient estimated taking medication this percent of the time:  greater than 90%.    Allergies were reviewed, assessed, and updated with the  patient.      Patient does not use any multi-dose medications prior to admission.    The information provided in this note is only as accurate as the sources available at the time of the update(s).    Thank you for the opportunity to participate in the care of this patient.    Alejandro Oreilly [facility]  4/26/2022 12:38 PM

## 2022-04-26 NOTE — ED PROVIDER NOTES
Emergency Department Encounter   NAME: Nalini Sepulveda ; AGE: 86 year old female ; YOB: 1935 ; MRN: [id number] ; EVALUATION DATE & TIME: 4/26/2022 11:55 AM ; PCP: Lisa Hassan   ED PROVIDER: Brigitte Kumar PA-C    Chief Complaint   Patient presents with     Fall       Medical Decision Making & Final Diagnosis     1. Traumatic rhabdomyolysis, initial encounter (H)    2. Altered mental status, unspecified altered mental status type         ED Course as of 04/26/22 2001 Tue Apr 26, 2022   1227 Nalini is a 86 year old female with a relevant PMH of DMT2, osteoprosis, dementia, HLD, HTN, h/o breast ca s/p masectomy who presents to the ED for evaluation of unwitnessed fall and increased confusion. Found down in bedroom at 9a. Last seen well 11p last night. She has fallen 3 times in the last 3 days.      My exam is notable for elevated BP and otherwise nl vital signs in a nontoxic appearing elderly woman. Superficial abrasions and pressure marks to R leg, L elbow, and L cheek. No bony tenderness to trunk or extremities. No midline TTP to bony spine. No focal deficits. Orientated to self and birth date. GCS 15.  Glucose 275 at arrival   1229 I considered a broad differential for this patient's presentation including occult infection, electrolyte or metabolic derangement, severe anemia, ACS, traumatic injury from fall, intracranial hemorrhage or hematoma, acute renal failure, sepsis, and others   1958 Patient's presentation consistent with traumatic rhabdomyolysis.  CK18 100 and leukocytosis 34.4.  No evidence of acute renal failure.  Mild hyperglycemia at 275.  UA with dehydration and some glucose urea but nothing to suggest acute infection.  UC is pending.  Chest x-ray is clear without evidence of occult infection.  CT head and C-spine without acute abnormality.  No clear traumatic injury or fracture from this fall.  This is presumed to fall given how she was found but syncope cannot be excluded.  EKG  here Reveals sinus rhythm without acute ST elevation or depression.  No pathologic arrhythmia.  When compared to prior EKG no significant change from baseline.  No ischemic changes.  Troponin is negative.  Patient denies chest pain or shortness of breath.  Lower suspicion for syncope or ACS as source of patient's fall.  No abdominal tenderness on my exam, skin changes for cellulitis, or other occult infection.  No fever here.  Suspect leukocytosis secondary to rhabdomyolysis.  Discussed with hospitalist do not be there is indication for antibiosis at this time.  Patient has been hypertensive in the ED but she did not take her blood pressure medication this morning.  Her p.o. lisinopril as ordered.  Plan for transfer to [facility] for further management and placement.  She was given IV fluid resuscitation here and IV magnesium replacement for hypomagnesia 1.2.            ED Course   11:57 AM I met and introduced myself to the patient. I gathered initial history and performed my physical exam. We discussed plan for initial workup.   I did see the patient while wearing full COVID-compliant PPE.  12:53 PM Lab called with amarilis HOOVER [id number]  1:45 PM updated pt and her daughter Sanaz. They are agreeable to transfer  3:08 PM Spoke with BIENVENIDO Church at [facility] who is agreeable to transfer    MEDICATIONS GIVEN IN THE EMERGENCY:   Medications   0.9% sodium chloride BOLUS (0 mLs Intravenous Stopped 4/26/22 1521)   magnesium sulfate 2 g in water intermittent infusion (0 g Intravenous Stopped 4/26/22 1521)   0.9% sodium chloride BOLUS (1,000 mLs Intravenous New Bag 4/26/22 1518)      NEW PRESCRIPTIONS STARTED AT TODAY'S ER VISIT:  Discharge Medication List as of 4/26/2022  5:43 PM        =================================================================   History   Patient information was obtained from: Daughter Sanaz and patient  Use of Intrepreter: N/A    Nalini Sepulveda is a 86 year old female with a relevant PMH of DMT2, osteoprosis,  dementia, HLD, HTN, h/o breast ca s/p masectomy who presents to the ED for evaluation of unwitnessed fall and increased confusion.   Pt lives independently and is checked in on by her 3 children.  This morning at 9 AM her son found her in her bedroom on the ground tangled up in her walker and more confused  than normal.  Last seen well around 11 PM last night but was weaker and more unsteady yesterday than normal.  In the last 3 days she has had 2 episodes of falling after tripping or slipping on something and landing on her bottom.  She does not take blood thinners.  She has had a 2 accidents with stool and urine in the last few days.  Has been eating and drinking normally.  Last medication change was in February where they Haft her metformin and discontinued some blood pressure medication.  Family has been looking for a memory care facility. She uses a walker and/or cane to get around.  She vaguely told her daughter she may have some right knee pain and abdominal pain this morning but denies any pain for me.  She is oriented to self and her birthdate. family denies fever, cough, complaints of chest pain, shortness of breath, vomiting, diarrhea, and new rashes. Pt does not remember falling last night. She took one pill of her morning meds but daughter is unsure which one.  ______________________________________________________________________  Past Medical History:   Diagnosis Date     Adenocarcinoma of breast (H)      Allergic rhinitis      Arthritis      Benign adenomatous polyp of large intestine      Breast cancer (H) 2006     Dementia without behavioral disturbance (H) 1/11/2022     Diabetes (H)      Hx of radiation therapy 2006     Hyperlipidemia      Hypertension      Menopause      S/P mastectomy 12/5/2015     Sleep apnea 6/9/2009        Past Surgical History:   Procedure Laterality Date     BIOPSY BREAST Left 2006     HYSTERECTOMY  1994     IR LUMBAR EPIDURAL STEROID INJECTION  3/4/2002     LUMPECTOMY  BREAST Left 2006     MASTECTOMY Left 12/2015     NE EXCIS BARTHOLIN GLAND/CYST      Description: Excision Of Bartholin's Gland Or Cyst;  Recorded: 07/29/2008;     NE INCISE VESTIBULAR NERVE,TRANSLABYR Left 12/4/2015    Procedure: LEFT LATISSIMUS DORSI FLAP BREAST RECONSTRUCTION WITH GEL IMPLANT;  Surgeon: Harley Abernathy MD;  Location: [facility];  Service:      NE MASTECTOMY, MODIFIED RADICAL Left 12/4/2015    Procedure: LEFT BREAST MASTECTOMY;  Surgeon: Jeferson Rodriguez MD;  Location: [facility];  Service: General     [facility] TOTAL ABDOM HYSTERECTOMY      Description: Total Abdominal Hysterectomy;  Recorded: 12/30/2009;  Comments: BSO       Family History   Problem Relation Age of Onset     Depression Mother      Diabetes Mother      Heart Disease Mother      Hypertension Mother      Cerebrovascular Disease Father      Cancer Maternal Grandmother         Thinks it was stomach cancer, but not sure.     Cancer Paternal Aunt 60.00        Thinks it was stomach cancer     Diabetes Sister      Heart Disease Sister        Social History     Tobacco Use     Smoking status: Never Smoker     Smokeless tobacco: Never Used   Substance Use Topics     Alcohol use: No     Drug use: No       REVIEW OF SYSTEMS:    Review of Systems   Unable to perform ROS: Dementia   Constitutional: Negative for chills and fever.   HENT: Negative for sore throat.    Eyes: Negative for visual disturbance.   Respiratory: Negative for cough and shortness of breath.    Cardiovascular: Negative for chest pain.   Gastrointestinal: Negative for abdominal pain, diarrhea, nausea and vomiting.   Genitourinary: Negative for dysuria.   Musculoskeletal: Negative for myalgias.   Skin: Negative for rash.   Neurological: Negative for headaches.   Psychiatric/Behavioral: The patient is not nervous/anxious.    All other systems reviewed and are negative.        Physical Exam   BP (!) 201/86   Pulse 102   Temp 98.7  F (37.1  C) (Temporal)    "Resp 26   Ht 1.499 m (4' 11\")   Wt 49 kg (108 lb)   SpO2 97%   BMI 21.81 kg/m      Physical Exam  Constitutional:       General: She is not in acute distress.     Appearance: Normal appearance.      Comments: Elevated BP and otherwise nl vital signs in a frail but nontoxic appearing woman.    HENT:      Head: Normocephalic.   Eyes:      Extraocular Movements: Extraocular movements intact.      Conjunctiva/sclera: Conjunctivae normal.      Pupils: Pupils are equal, round, and reactive to light.   Cardiovascular:      Rate and Rhythm: Normal rate and regular rhythm.      Heart sounds: Normal heart sounds.   Pulmonary:      Effort: Pulmonary effort is normal. No respiratory distress.      Breath sounds: Normal breath sounds. No wheezing, rhonchi or rales.   Abdominal:      General: There is no distension.      Palpations: Abdomen is soft.      Tenderness: There is no abdominal tenderness. There is no guarding or rebound.   Musculoskeletal:         General: No swelling or deformity. Normal range of motion.      Cervical back: Normal range of motion.      Right lower leg: No edema.      Left lower leg: No edema.      Comments: Superficial abrasion with petechia to L elbow. approx 3x4 cm area of erythema to inside R thigh just proximal to knee. Small area of erythema to L cheek. No bony TTP to midline spine. No bony TTP to chest wall, pelvis, or extremities.    Skin:     General: Skin is warm.   Neurological:      General: No focal deficit present.      Mental Status: She is alert.      Comments: Orientated to self and birthdate. Follows commands. GCS 15.  Lifts and moves all extremities without pain. Sensation intact to light touch in all extremities.   Psychiatric:         Mood and Affect: Mood normal.         Lab Work (Reviewed and Interpreted):   Labs Ordered and Resulted from Time of ED Arrival to Time of ED Departure   GLUCOSE BY METER - Abnormal       Result Value    GLUCOSE BY METER POCT 275 (*)    BASIC " METABOLIC PANEL - Abnormal    Sodium 136      Potassium 4.0      Chloride 98      Carbon Dioxide (CO2) 20 (*)     Anion Gap 18      Urea Nitrogen 17      Creatinine 0.96      Calcium 9.6      Glucose 275 (*)     GFR Estimate 58 (*)    MAGNESIUM - Abnormal    Magnesium 1.2 (*)    CK TOTAL - Abnormal    CK 1,828 (*)    ROUTINE UA WITH MICROSCOPIC REFLEX TO CULTURE - Abnormal    Color Urine Light Yellow      Appearance Urine Clear      Glucose Urine 500  (*)     Bilirubin Urine Negative      Ketones Urine 60  (*)     Specific Gravity Urine 1.013      Blood Urine 1.0 mg/dL (*)     pH Urine 6.0      Protein Albumin Urine 200  (*)     Urobilinogen Urine <2.0      Nitrite Urine Negative      Leukocyte Esterase Urine Negative      Bacteria Urine Few (*)     Mucus Urine Present (*)     RBC Urine 1      WBC Urine 6 (*)    CBC WITH PLATELETS AND DIFFERENTIAL - Abnormal    WBC Count 34.4 (*)     RBC Count 3.46 (*)     Hemoglobin 12.0      Hematocrit 34.7 (*)           MCH 34.7 (*)     MCHC 34.6      RDW 11.9      Platelet Count 333     DIFFERENTIAL - Abnormal    % Neutrophils 89      % Lymphocytes 2      % Monocytes 9      % Eosinophils 0      % Basophils 0      Absolute Neutrophils 30.6 (*)     Absolute Lymphocytes 0.7 (*)     Absolute Monocytes 3.1 (*)     Absolute Eosinophils 0.0      Absolute Basophils 0.0      RBC Morphology Confirmed RBC Indices      Platelet Assessment        Value: Automated Count Confirmed. Platelet morphology is normal.   COVID-19 VIRUS (CORONAVIRUS) BY PCR - Normal    SARS CoV2 PCR Negative     TROPONIN I - Normal    Troponin I 0.07     GLUCOSE MONITOR NURSING POCT       Imaging (Reviewed and Interpreted):   XR Chest 1 View   Final Result   IMPRESSION: The heart is normal in size with mild aortic calcification. Hazy density overlying the left lateral lung base which is likely due to overlying soft tissue density and right breast shadowing. The remainder of the lungs appear clear. No     pneumothorax. Left shoulder arthroplasty. Scoliosis and degenerative changes of the spine. No definitive acute fracture.      Cervical spine CT w/o contrast   Final Result   IMPRESSION:   1.  No acute cervical spine fracture.      Head CT w/o contrast   Final Result   IMPRESSION:   1.  No acute intracranial injury.          EKG (Reviewed and Interpreted):   Sinus rhythm  No Acute ST elevation or depression. No pathologic arrhythmia.  No change from prior EKGMar 2017   EKG results reviewed and interpreted by Dr. Becky ED MD.     Brigitte Kumar PA-C   Emergency Medicine   [facility] EMERGENCY ROOM  [address] [facility] [address]  [phone]  Dept: [phone]        Brigitte Kumar PA-C  04/26/22 2002

## 2022-04-26 NOTE — TELEPHONE ENCOUNTER
3-Hospitalist Huddle Documentation    Acuity/Preferred Bed Type: medical floor  Infection Concerns: none  Additional Specialist Needed Or Specialist Already Contacted:   None  Timely Treatments Needed: No  Important things to know/address during hospitalization: unclear, hx of dementia but lives independently. no acute issues reported    Brigitte Kumar INEZ at [facility] ED    87 yo F dementia, DM, HTN. Found down this AM, last normal last evening. Unwitnessed fall, CK elevated, trop negative, WBC 34, Mag 1.2. UA unremarkable, CXR clear. Lives independently, children check on her regularly, looking for memory care. Increased falls over past several days, increased confusion noted today. Mag replacement given in ED. No abx given, no obvious source.    Tsering Church PA-C

## 2022-04-26 NOTE — ED TRIAGE NOTES
Patient was found this morning by family at her private home, confused, not able to ambulate, on the floor which isn't normal for her.  History of UTI's.  She's on the list for memory care at this time.  Information given through daughter.  C/o right knee pain and abdominal pain per daughter.  No blood thinners and unwitnessed fall.  Not sure when she fell or how long she was on the floor for, she lives independently.

## 2022-04-26 NOTE — ED PROVIDER NOTES
"Emergency Department Staff Physician Note     I had a face to face encounter with this patient seen by the Advanced Practice Provider (INEZ).  I have seen, examined, and discussed the patient with the INEZ and agree with their assessment and plan of management.    Relevant HPI:     Nalini Sepulveda is a 86 year old female who presents to the Emergency Department for evaluation of fall. This morning around 9am the patient's son found her down on the ground at home tangled in her wheelchair. Incontinent of urine. She also sounded more confused. Patient endorses fatigue. Denies pain.     I, Nicole Berrios, am serving as a scribe to document services personally performed by Garth Stern DO, based on my observations and the provider's statements to me.   I, Garth Stern DO, attest that Nicole Berrios was acting in a scribe capacity, has observed my performance of the services and has documented them in accordance with my direction.    ED Course:  12:17 PM I received the patient report from the INEZ, ATTILA Watson. I agree with their assessment and plan of management, and I will see the patient.  12:19 PM I met with the patient to introduce myself, gather additional history, perform my initial exam, and discuss the plan.     Brief Physical Exam:  VITAL SIGNS: BP (!) 163/85   Pulse 102   Temp 98.7  F (37.1  C) (Temporal)   Resp 18   Ht 1.499 m (4' 11\")   Wt 49 kg (108 lb)   SpO2 100%   BMI 21.81 kg/m       General Appearance: Well-appearing, well-nourished, no acute distress   Head:  Normocephalic, without obvious abnormality, atraumatic  Eyes:  PERRL, conjunctiva/corneas clear, EOM's intact,  ENT:  Lips, mucosa, and tongue normal, membranes are moist without pallor  Neck:  Normal ROM, symmetrical, trachea midline    Cardio:  Regular rate and rhythm, no murmur, rub or gallop, 2+ pulses symmetric in all extremities  Pulm:  Clear to auscultation bilaterally, respirations unlabored,   Abdomen:  Soft, non-tender, " no rebound or guarding.  Musculoskeletal: Full ROM, no edema, no cyanosis, good ROM of major joints  Integument:  Warm, Dry, No erythema, No rash.    Neurologic:  Alert & oriented to self and place.  No focal deficits appreciated.  Ambulatory.  Psychiatric:  Affect normal, Judgment normal, Mood normal.         Impression / ED Plan:  I had a face to face encounter with this patient seen by the Advanced Practice Provider (INEZ).  I have seen, examined, and discussed the patient with the INEZ and agree with their assessment and plan of management. I personally saw the patient and performed a substantive portion of the visit including all aspects of the medical decision making.    Nalini Sepulveda is a 86 year old female presents to the ED for evaluation of fall.  Patient is confused compared to her normal baseline mental status, and we are uncertain whether it is a syncopal fall versus a mechanical fall.  Also unsure how long the patient was down, but her CK significant elevated concerning for rhabdomyolysis.  With this and her altered mental status, do believe the patient will require admission for further management.  Fortunately there is no evidence of renal dysfunction at this time.  She does have a significantly elevated white count, but cannot find an obvious source of infection on this patient at this time.  Plan is for admission for further management    1. Traumatic rhabdomyolysis, initial encounter (H)    2. Altered mental status, unspecified altered mental status type        Garth Stern DO  Staff Physician  [facility] Emergency Department        Garth Stern DO  04/26/22 [id number]

## 2022-04-26 NOTE — H&P
[facility]  Hospitalist Admission Note  Name: Nalini Sepulveda    MRN: [id number]  YOB: 1935    Age: 86 year old  Date of admission: 4/26/2022  Primary care provider: Lisa Hassan    Chief Complaint: AMS, fall    Assessment and Plan:   Altered mental status:  Patient presents with confusion on top of her underlying dementia.  Family reports that she is not quite herself.  She is a poor historian and unable to provide much history to me.  The family is reportedly working on getting her into her memory care unit but there is no clear timeline for this.  Work-up was mostly notable for rhabdomyolysis, leukocytosis.  Work-up for infection is largely unremarkable apart from some hazy opacities in the lung.  I do wonder if she has a mild pneumonia causing altered mentation.  -We will start ceftriaxone and azithromycin  -Blood cultures/urine cultures  -We will obtain LFTs, TSH, ammonia  -Delirium precaution  -Social work, PT, OT    Mechanical fall:  Patient uses a walker at baseline and is independent at home otherwise.  She reportedly has had 3 falls in the past couple of days which is unusual for her. Most fall seem to be in the setting of getting tangled up in her walker. Family already working on getting in her memory care unit.    -PT/OT    Leukocytosis:  Significant leukocytosis to 34.4 with left shift.  Etiology of this is unclear.  She is afebrile, normotensive.  She has no other signs of sepsis.  There is no evidence for urinary tract infection.  Chest x-ray is with some hazy opacities in the lung however the patient does not obviously have respiratory failure cough.  She does have some crackles in her left lung.  Some of this may be stress response in the setting of rhabdomyolysis and fall.  -Given frailty we will opt to treat with ceftriaxone and azithromycin for coverage of CAP  -Repeat CBC in the morning    Rhabdomyolysis:  CK elevated to greater than 1800.  This is in the  setting of mechanical fall and being down for an unknown period of time.  Fortunately she does not have any obvious evidence of acute kidney injury.  -IV fluid resuscitation at 75 cc/h in this patient who is only 100 pounds    Hypomagnesemia:  Unclear etiology but I suspect that this is most likely related to poor p.o. intake in the setting of recent falls.  -Magnesium replacement protocol    T2DM  Hyperglycemia:  Hyperglycemia greater than 200 on presentation..  She takes metformin only prior to arrival.  -Hold metformin  -Medium dose sliding scale insulin to start    Hypertension:  Appears to take metoprolol prior to arrival.  -We will resume once med rec complete    Hyperlipidemia:  Appears to take atorvastatin prior to arrival.  -We will resume once med rec complete    Dementia: Noted, delirium protocol.  Family working on getting patient into memory care unit.      Clinically Significant Risk Factors Present on Admission           # Hypomagnesemia: Mg = 1.2 mg/dL (Ref range: 1.8 - 2.6 mg/dL) on admission, will replace as needed  # Anion Gap Metabolic Acidosis: AG = 18 mmol/L (Ref range: 5 - 18 mmol/L) on admission, will monitor and treat as appropriate               DVT Prophylaxis: Pneumatic Compression Devices  Code Status: DNR / DNI, discussed with daughter at bedside.  Discharge Dispo: Pending clinical improvement  Estimated Disch Date / # of Days until Disch: Anticipate discharge in 2 to 3 days pending PT/OT recs, social work, clinical improvement.      History of Present Illness:  Nalini Sepulveda is a 86 year old female with PMH including breast cancer status postmastectomy, dementia, diabetes mellitus,, hypertension, hyperlipidemia, sleep apnea who presents on 4/26/2022 with fall, altered mentation.    Per report, the patient has been doing well recently.  She does have a known history of dementia for which she the family is looking into memory care unit.  She is living at home.  Over the past couple of  days it seems that she has had at least 3 falls.  Today the family found her again tangled up in her wheelchair but this time she seemed a bit more confused as compared to usual.  This was new and therefore they decided to present to the emergency department.  All the patient endorses to me he has some nausea she otherwise denies any symptoms but is a terrible historian.  The daughter is at bedside and denies that the patient's been complaining of any new symptoms over the past couple of days to weeks.    ED work-up was notable for a BMP which is largely normal apart from a carbon dioxide of 20.  Magnesium 1.2.  CBC is notable for a significant elevation of white blood cell count to 34.4.  Her CK was elevated to 1828.  Troponin is normal.  Urinalysis is notable for blood but negative for RBCs consistent with myoglobinuria.  Urinalysis also shows elevated glucose elevated ketones elevated protein.  Not obvious for any urinary tract infection.  She is COVID-negative.  Chest x-ray shows hazy density overlying the left lateral lung base.  CT cervical spine is without acute fracture and CT head is without any acute intracranial pathology.       Past Medical History:  Past Medical History:   Diagnosis Date     Adenocarcinoma of breast (H)     left     Allergic rhinitis      Arthritis      Benign adenomatous polyp of large intestine      Breast cancer (H) 2006    Left side     Dementia without behavioral disturbance (H) 1/11/2022     Diabetes (H)      Hx of radiation therapy 2006     Hyperlipidemia      Hypertension      Menopause      S/P mastectomy 12/5/2015     Sleep apnea 6/9/2009     Past Surgical History:  Past Surgical History:   Procedure Laterality Date     BIOPSY BREAST Left 2006     HYSTERECTOMY  1994     IR LUMBAR EPIDURAL STEROID INJECTION  3/4/2002     LUMPECTOMY BREAST Left 2006     MASTECTOMY Left 12/2015     ID EXCIS BARTHOLIN GLAND/CYST      Description: Excision Of Bartholin's Gland Or Cyst;  Recorded:  07/29/2008;     NC INCISE VESTIBULAR NERVE,TRANSLABYR Left 12/4/2015    Procedure: LEFT LATISSIMUS DORSI FLAP BREAST RECONSTRUCTION WITH GEL IMPLANT;  Surgeon: Harley Abernathy MD;  Location: [facility];  Service:      NC MASTECTOMY, MODIFIED RADICAL Left 12/4/2015    Procedure: LEFT BREAST MASTECTOMY;  Surgeon: Jeferson Rodriguez MD;  Location: [facility];  Service: General     ZZC TOTAL ABDOM HYSTERECTOMY      Description: Total Abdominal Hysterectomy;  Recorded: 12/30/2009;  Comments: BSO     Social History:  Social History     Tobacco Use     Smoking status: Never Smoker     Smokeless tobacco: Never Used   Substance Use Topics     Alcohol use: No     Social History     Social History Narrative    Daughter thinks that she was checked before with chickenpox states that she no longer drives.  She reports that [place] is [place] has not been out there for nearly 20 years.  She reports that she enjoyed living in [place] taking the train to [facility]  S he reports that she would like to exercise and go to the [facility].    Patient lives in a house with her dog [son owns the house].  Twice a day, with some delivers her pills and make sure is that she takes them.  He also make sure that the dog is being taken  care of.  Her daughter is also: Check.  There are cameras in the house,, for safety as well.    Betsy Mullen MD  6/27/2021         Family History:  Family History   Problem Relation Age of Onset     Depression Mother      Diabetes Mother      Heart Disease Mother      Hypertension Mother      Cerebrovascular Disease Father      Cancer Maternal Grandmother         Thinks it was stomach cancer, but not sure.     Cancer Paternal Aunt 60.00        Thinks it was stomach cancer     Diabetes Sister      Heart Disease Sister      Allergies:  Allergies   Allergen Reactions     Codeine Hives and Itching     Medications:  Medications Prior  "to Admission   Medication Sig Dispense Refill Last Dose     acetaminophen (TYLENOL EXTRA STRENGTH) 500 MG tablet [ACETAMINOPHEN (TYLENOL EXTRA STRENGTH) 500 MG TABLET] Take 2 tablets (1,000 mg total) by mouth 2 (two) times a day. 100 tablet 2      atorvastatin (LIPITOR) 20 MG tablet [ATORVASTATIN (LIPITOR) 20 MG TABLET] Take 1 tablet (20 mg total) by mouth daily. 90 tablet 3      b complex vitamins tablet [B COMPLEX VITAMINS TABLET] Take 1 tablet by mouth daily.        BIOTIN ORAL [BIOTIN ORAL] Take 1 tablet by mouth daily.        cholecalciferol, vitamin D3, 1,000 unit tablet [CHOLECALCIFEROL, VITAMIN D3, 1,000 UNIT TABLET] Take 2,000 Units by mouth daily.        lisinopriL (PRINIVIL,ZESTRIL) 40 MG tablet [LISINOPRIL (PRINIVIL,ZESTRIL) 40 MG TABLET] Take 1 tablet (40 mg total) by mouth daily. 90 tablet 3      metFORMIN (GLUCOPHAGE) 500 MG tablet TAKE 2 TABLETS BY MOUTH 2 TIMES A DAY WITH MEALS. (Patient taking differently: Take 500 mg by mouth 2 times daily (with meals)) 360 tablet 1      metoprolol succinate (TOPROL-XL) 100 MG 24 hr tablet [METOPROLOL SUCCINATE (TOPROL-XL) 100 MG 24 HR TABLET] Take 1 tablet (100 mg total) by mouth daily. 90 tablet 3      Review of Systems:  A Comprehensive greater than 10 system review of systems was carried out.  Pertinent positives and negatives are noted above.  Otherwise negative for contributory information.     Physical Exam:  Blood pressure (!) 177/84, pulse 100, temperature 98.7  F (37.1  C), temperature source Oral, resp. rate 20, height 1.499 m (4' 11\"), SpO2 94 %.  Wt Readings from Last 1 Encounters:   04/26/22 49 kg (108 lb)     Exam:  General: Alert, awake, no acute distress.  Frail-appearing.  Slouched over in bed.  Emaciated.  HEENT: NC/AT, eyes anicteric, external occular movements intact, face symmetric.  Mucous membranes dry.  Cardiac: Mild tachycardia, S1, S2.  No murmurs appreciated.  Pulmonary: Normal chest rise, normal work of breathing.  Right lung clear.  " Left lungs with crackles largely in the mid lung field.  Abdomen: soft, non-tender, non-distended.  Bowel Sounds Present.  No guarding.  Extremities: no deformities.  Warm, well perfused.  Skin: no rashes or lesions noted.  Warm and Dry.  Neuro: No focal deficits noted.  Speech clear.  Coordination and strength grossly normal.  Psych: Appropriate affect.    Data:  EKG:  NSR  Imaging:  Recent Results (from the past 24 hour(s))   Head CT w/o contrast    Narrative    EXAM: CT HEAD W/O CONTRAST  LOCATION: [facility]  DATE/TIME: 4/26/2022 12:36 PM    INDICATION: Head trauma, minor (Age >= 65y).  COMPARISON: None.  TECHNIQUE: Routine CT Head without IV contrast. Multiplanar reformats. Dose reduction techniques were used.    FINDINGS:  INTRACRANIAL CONTENTS: No intracranial hemorrhage, extraaxial collection, or mass effect.  No CT evidence of acute infarct. Moderate to severe presumed chronic small vessel ischemic changes. Generally mild atrophy more pronounced in the right temporal   region than elsewhere as noted on the prior brain MRI.     VISUALIZED ORBITS/SINUSES/MASTOIDS: No intraorbital abnormality. No paranasal sinus mucosal disease. No middle ear or mastoid effusion.    BONES/SOFT TISSUES: No acute abnormality.      Impression    IMPRESSION:  1.  No acute intracranial injury.   Cervical spine CT w/o contrast    Narrative    EXAM: CT CERVICAL SPINE W/O CONTRAST  LOCATION: [facility]  DATE/TIME: 4/26/2022 12:36 PM    INDICATION: Neck trauma (Age >= 65y)  COMPARISON: None.  TECHNIQUE: Routine CT Cervical Spine without IV contrast. Multiplanar reformats. Dose reduction techniques were used.    FINDINGS:  Normal alignment. Cervical vertebral body heights preserved.  No acute cervical spine fracture. No prevertebral soft tissue swelling. Multilevel degenerative changes without evidence for high-grade spinal canal narrowing.      Impression    IMPRESSION:  1.  No acute  cervical spine fracture.   XR Chest 1 View    Narrative    EXAM: XR CHEST 1 VIEW  LOCATION: [facility]  DATE/TIME: 4/26/2022 12:34 PM    INDICATION: Trauma with fall and pain.  COMPARISON: 3/14/2019.      Impression    IMPRESSION: The heart is normal in size with mild aortic calcification. Hazy density overlying the left lateral lung base which is likely due to overlying soft tissue density and right breast shadowing. The remainder of the lungs appear clear. No   pneumothorax. Left shoulder arthroplasty. Scoliosis and degenerative changes of the spine. No definitive acute fracture.     Labs:  Recent Labs   Lab 04/26/22  1212   WBC 34.4*   HGB 12.0   HCT 34.7*                Lab Results   Component Value Date     04/26/2022     01/05/2022     06/02/2021    Lab Results   Component Value Date    CHLORIDE 98 04/26/2022    CHLORIDE 101 01/05/2022    CHLORIDE 102 06/02/2021    Lab Results   Component Value Date    BUN 17 04/26/2022    BUN 17 01/05/2022    BUN 18 06/02/2021      Lab Results   Component Value Date    POTASSIUM 4.0 04/26/2022    POTASSIUM 4.8 01/05/2022    POTASSIUM 4.2 06/02/2021    Lab Results   Component Value Date    CO2 20 04/26/2022    CO2 20 01/05/2022    CO2 19 06/02/2021    Lab Results   Component Value Date    CR 0.96 04/26/2022    CR 0.86 01/05/2022    CR 0.82 06/02/2021          DO Jeffery Ringist  [facility]

## 2022-04-27 NOTE — PHARMACY-ADMISSION MEDICATION HISTORY
Medication reconciliation completed at [facility] prior to transfer yesterday.  See pharmacy admission medication history note from 4/26/22.

## 2022-04-27 NOTE — PROGRESS NOTES
End of Shift Summary  For vital signs and complete assessments, please see documentation flowsheets.     Pertinent assessments: Pt alert, disoriented to time/situation, no signs of pain, resting comfortably at this time. Sitter at bedside. Pure wick in place, repo as tolerated. HR tachy, elevated BP, on RA. BG check 163    Major Shift Events:uneventful     Treatment Plan: IVF, IV Zithromax, Rocephin, monitor BGs, PT/OT/SW consults    Bedside Nurse: Gabby Juan RN

## 2022-04-27 NOTE — PLAN OF CARE
Pertinent assessments:  Admitted to the unit after transfer from [facility]. Alert to self and place. Disoriented to time, situation. Patient restless and removed IV during transfer. Bilateral mits placed. New IV placed. PRN Tylenol and Haldol given. Patient on telemetry- sinus tach. Lungs CTA. Bowel sounds active in all quadrants. Tolerating clear liquid diet. Incontinent of urine. Purewick in use. MIVF running at 75 cc/hr. Up with assist of 2 and a lift. Hard of Hearing       Major Shift Events Admitted to the floor    Treatment Plan: Safety. PT/OT assessment, Social work consult.     Bedside Nurse: Nithin Fonseca RN

## 2022-04-27 NOTE — PLAN OF CARE
End of Shift Summary  For vital signs and complete assessments, please see documentation flowsheets.     Pertinent assessments: BP elevated, tachycardic. Denies pain, no apparent pain. Somnolent most of day despite attempts at keeping patient awake, arouses to voice for short bouts of time; oriented to self only. Limited PO intake due to somnolence. Lungs clear, diminished. No cough, remains on RA. Up in chair x1 with lift, did not improve mentation.   Major Shift Events: IV fluid rate increased by MD this AM after noted increase in CK.   Treatment Plan: Delirium prevention interventions as able. IV Rocehpin/Zithromax, IV fluids.

## 2022-04-27 NOTE — CONSULTS
Care Management Initial Consult    General Information  Assessment completed with: Children, Sanaz daughter  Type of CM/SW Visit: Initial Assessment    Primary Care Provider verified and updated as needed:     Readmission within the last 30 days:        Reason for Consult: discharge planning  Advance Care Planning:            Communication Assessment  Patient's communication style: spoken language (English or Bilingual)    Hearing Difficulty or Deaf: yes   Wear Glasses or Blind: yes (no glasses with her)    Cognitive  Cognitive/Neuro/Behavioral: .WDL except  Level of Consciousness: confused, somnolent  Arousal Level: arouses to voice  Orientation: disoriented to, time, place, situation  Mood/Behavior: calm, cooperative, restless  Best Language: 0 - No aphasia  Speech: word-finding difficulty, slow    Living Environment:   People in home: alone     Current living Arrangements: house   Family have cameras in the living spaced so they can keep an eye on her and communication with her if she does not answer the phone      Able to return to prior arrangements: yes  Living Arrangement Comments: pt is able to return back to her home following TCU    Son stops in twice per day to check on pt, ensure she has taken her medications and have eating her meals.   Pt' 2 adult daughter stop in twice per week to be present when pt takes a shower.     Family/Social Support:  Care provided by: self, child(kacy)  Provides care for: no one  Marital Status:   Children          Description of Support System: Supportive, Involved    Support Assessment: Adequate family and caregiver support, Adequate social supports    Current Resources:   Patient receiving home care services: No     Community Resources: None  Equipment currently used at home: cane, straight, walker, standard  Supplies currently used at home: None at this time,.      Employment/Financial:  Employment Status:        Retired   Financial Concerns:         N/a     Lifestyle  & Psychosocial Needs:  Social Determinants of Health     Tobacco Use: Low Risk      Smoking Tobacco Use: Never Smoker     Smokeless Tobacco Use: Never Used   Alcohol Use: Not At Risk     Frequency of Alcohol Consumption: Never     Average Number of Drinks: Patient refused     Frequency of Binge Drinking: Never   Financial Resource Strain: Low Risk      Difficulty of Paying Living Expenses: Not very hard   Food Insecurity: No Food Insecurity     Worried About Running Out of Food in the Last Year: Never true     Ran Out of Food in the Last Year: Never true   Transportation Needs: No Transportation Needs     Lack of Transportation (Medical): No     Lack of Transportation (Non-Medical): No   Physical Activity: Inactive     Days of Exercise per Week: 0 days     Minutes of Exercise per Session: 0 min   Stress: No Stress Concern Present     Feeling of Stress : Not at all   Social Connections: Socially Isolated     Frequency of Communication with Friends and Family: More than three times a week     Frequency of Social Gatherings with Friends and Family: Never     Attends Amish Services: Never     Active Member of Clubs or Organizations: No     Attends Club or Organization Meetings: Never     Marital Status:    Intimate Partner Violence: Not At Risk     Fear of Current or Ex-Partner: No     Emotionally Abused: No     Physically Abused: No     Sexually Abused: No   Depression: Not at risk     PHQ-2 Score: 0   Housing Stability: Low Risk      Unable to Pay for Housing in the Last Year: No     Number of Places Lived in the Last Year: 1     Unstable Housing in the Last Year: No       Functional Status:  Prior to admission patient needed assistance:   Dependent ADLs:: Ambulation-walker  Dependent IADLs:: Cleaning, Cooking, Laundry, Shopping, Medication Management, Transportation  Assesssment of Functional Status: Not at baseline with ADL Functioning, Not at baseline with mobility, Not at  functional baseline, Needs  placement in a SNF/TCF for rehabilitation    Mental Health Status:  Mental Health Status: No Current Concerns       Chemical Dependency Status:  Chemical Dependency Status: No Current Concerns             Values/Beliefs:  Spiritual, Cultural Beliefs, Oriental orthodox Practices, Values that affect care:                 Additional Information:  Spoke with daughter Sanaz.. Family anticipate pt will have needs for TCU.. At baseline she uses a walker. Is able to care for self for basic needs at home.  Family have her on the [facility] in [place]. They feel pt has been isolated since her spouse passed and [facility] provide the social support pt needs as well as escorts to meals and med mgt.. Pt is NOT a flight risk    Pt not open to any outside support at this time    Should pt need TCU, daughter would prefer [facility] for TCU needs.     Corinne C. White, LSW

## 2022-04-27 NOTE — PROGRESS NOTES
[facility]    Hospitalist Progress Note  Provider : Ian Duarte MD  Date of Service (when I saw the patient): 04/27/2022    Assessment & Plan   Altered mental status:  Patient presents with confusion on top of her underlying dementia.  Family reports that she is not quite herself.  She is a poor historian and unable to provide much history to me.  The family is reportedly working on getting her into her memory care unit but there is no clear timeline for this.  Work-up was mostly notable for rhabdomyolysis, leukocytosis.  Work-up for infection is largely unremarkable apart from some hazy opacities in the lung. I do wonder if she has a mild pneumonia causing altered mentation.  -Will continue ceftriaxone and azithromycin  -Blood cultures/urine cultures pending  -TSH and ammonia level normal  -Delirium precaution  -Social work, PT, OT     Mechanical fall:  -Patient uses a walker at baseline and is independent at home otherwise.   -She reportedly has had 3 falls in the past couple of days which is unusual for her. Most fall seem to be in the setting of getting tangled up in her walker. Family already working on getting in her memory care unit.    -PT/OT  - consulted for discharge planning     Leukocytosis:  -Significant leukocytosis to 34.4 with left shift.  Etiology of this is unclear.  She is afebrile, normotensive.  She has no other signs of sepsis.  There is no evidence for urinary tract infection.  Chest x-ray is with some hazy opacities in the lung however the patient does not obviously have respiratory failure cough.   -Given frailty we will opt to treat with ceftriaxone and azithromycin for coverage of CAP  -wbc down to 29.2 today     Rhabdomyolysis:  -CK elevated to greater than 1800 on admission.  This is in the setting of mechanical fall and being down for an unknown period of time.  Fortunately she does not have any obvious evidence of acute kidney  injury.  -Repeat CK 2233 today  -Will increase normal saline to 125 cc/h for now. Will monitor for fluid overload     Hypomagnesemia:  -Unclear etiology but I suspect that this is most likely related to poor p.o. intake in the setting of recent falls.  -Magnesium replacement protocol     T2DM  Hyperglycemia:  -Hyperglycemia greater than 200 on presentation..  She takes metformin only prior to arrival.  -Hold metformin  -Medium dose sliding scale insulin to start     Hypertension:  -Appears to take metoprolol prior to arrival.  -We will resume once med rec complete     Hyperlipidemia:  -Appears to take atorvastatin prior to arrival.  -We will resume once med rec complete     Dementia: Noted, delirium protocol.  Family working on getting patient into memory care unit.    DVT Prophylaxis: Pneumatic Compression Devices  Code Status: No CPR- Do NOT Intubate    Disposition: Expected discharge: anticipate at least 2 nights of hospital course.     Ian Duarte MD    Interval History   Patient seen and examined. She has dementia and unable to obtain reliable history. She looks a little agitated. She has no nausea or vomiting.     -Data reviewed today: I reviewed all new labs and imaging results over the last 24 hours. I personally reviewed     Physical Exam   Temp: 98  F (36.7  C) Temp src: Oral BP: (!) 159/72 Pulse: 114   Resp: 20 SpO2: 97 % O2 Device: None (Room air)    Vitals:    04/26/22 1827 04/27/22 0500   Weight: 48.3 kg (106 lb 6.4 oz) 48.7 kg (107 lb 6.4 oz)     Vital Signs with Ranges  Temp:  [98  F (36.7  C)-98.7  F (37.1  C)] 98  F (36.7  C)  Pulse:  [] 114  Resp:  [16-30] 20  BP: (159-201)/(60-93) 159/72  SpO2:  [94 %-100 %] 97 %  I/O last 3 completed shifts:  In: 811 [I.V.:811]  Out: 350 [Urine:350]    GEN:  Alert, oriented x 3, appears comfortable, NAD.  HEENT:  Normocephalic/atraumatic, no scleral icterus, no nasal discharge, mouth moist.  CV:  Regular rate and rhythm, no murmur or JVD.  S1 + S2  noted, no S3 or S4.  LUNGS:  Clear to auscultation bilaterally without rales/rhonchi/wheezing/retractions.  Symmetric chest rise on inhalation noted.  ABD:  Active bowel sounds, soft, non-tender/non-distended.  No rebound/guarding/rigidity.  EXT:  No edema or cyanosis.  Hands/feet warm to touch with good signs of peripheral perfusion.  No joint synovitis noted.  SKIN:  Dry to touch, no exanthems noted in the visualized areas.  NEURO:  Symmetric muscle strength, sensation to touch grossly intact.  No new focal deficits appreciated.    Medications     sodium chloride 125 mL/hr at 04/27/22 0947       azithromycin  500 mg Intravenous Q24H     cefTRIAXone  1 g Intravenous Q24H     insulin aspart  1-7 Units Subcutaneous TID AC     insulin aspart  1-5 Units Subcutaneous At Bedtime     sodium chloride (PF)  3 mL Intracatheter Q8H       Data   Recent Labs   Lab 04/27/22  0833 04/27/22  0638 04/27/22  0203 04/26/22 2015 04/26/22  1956 04/26/22  1212   WBC  --  29.2*  --   --   --  34.4*   HGB  --  12.1  --   --   --  12.0   MCV  --  106*  --   --   --  100   PLT  --  281  --   --   --  333   NA  --  135  --   --   --  136   POTASSIUM  --  3.6  --   --   --  4.0   CHLORIDE  --  103  --   --   --  98   CO2  --  23  --   --   --  20*   BUN  --  10  --   --   --  17   CR  --  0.70  --   --   --  0.96   ANIONGAP  --  9  --   --   --  18   GWENDOLYN  --  8.5  --   --   --  9.6   * 170* 163*   < >  --  275*   ALBUMIN  --   --   --   --  4.0  --    PROTTOTAL  --   --   --   --  7.7  --    BILITOTAL  --   --   --   --  0.7  --    ALKPHOS  --   --   --   --  67  --    ALT  --   --   --   --  40  --    AST  --   --   --   --  84*  --     < > = values in this interval not displayed.       Recent Results (from the past 24 hour(s))   Head CT w/o contrast    Narrative    EXAM: CT HEAD W/O CONTRAST  LOCATION: [facility]  DATE/TIME: 4/26/2022 12:36 PM    INDICATION: Head trauma, minor (Age >= 65y).  COMPARISON:  None.  TECHNIQUE: Routine CT Head without IV contrast. Multiplanar reformats. Dose reduction techniques were used.    FINDINGS:  INTRACRANIAL CONTENTS: No intracranial hemorrhage, extraaxial collection, or mass effect.  No CT evidence of acute infarct. Moderate to severe presumed chronic small vessel ischemic changes. Generally mild atrophy more pronounced in the right temporal   region than elsewhere as noted on the prior brain MRI.     VISUALIZED ORBITS/SINUSES/MASTOIDS: No intraorbital abnormality. No paranasal sinus mucosal disease. No middle ear or mastoid effusion.    BONES/SOFT TISSUES: No acute abnormality.      Impression    IMPRESSION:  1.  No acute intracranial injury.   Cervical spine CT w/o contrast    Narrative    EXAM: CT CERVICAL SPINE W/O CONTRAST  LOCATION: [facility]  DATE/TIME: 4/26/2022 12:36 PM    INDICATION: Neck trauma (Age >= 65y)  COMPARISON: None.  TECHNIQUE: Routine CT Cervical Spine without IV contrast. Multiplanar reformats. Dose reduction techniques were used.    FINDINGS:  Normal alignment. Cervical vertebral body heights preserved.  No acute cervical spine fracture. No prevertebral soft tissue swelling. Multilevel degenerative changes without evidence for high-grade spinal canal narrowing.      Impression    IMPRESSION:  1.  No acute cervical spine fracture.   XR Chest 1 View    Narrative    EXAM: XR CHEST 1 VIEW  LOCATION: [facility]  DATE/TIME: 4/26/2022 12:34 PM    INDICATION: Trauma with fall and pain.  COMPARISON: 3/14/2019.      Impression    IMPRESSION: The heart is normal in size with mild aortic calcification. Hazy density overlying the left lateral lung base which is likely due to overlying soft tissue density and right breast shadowing. The remainder of the lungs appear clear. No   pneumothorax. Left shoulder arthroplasty. Scoliosis and degenerative changes of the spine. No definitive acute fracture.

## 2022-04-28 NOTE — PHARMACY-VANCOMYCIN DOSING SERVICE
Pharmacy Vancomycin Initial Note  Date of Service 2022  Patient's  1935  86 year old, female    Indication: Sepsis    Current estimated CrCl = Estimated Creatinine Clearance: 46 mL/min (based on SCr of 0.7 mg/dL).    Creatinine for last 3 days  2022: 12:12 PM Creatinine 0.96 mg/dL  2022:  6:38 AM Creatinine 0.70 mg/dL    Recent Vancomycin Level(s) for last 3 days  No results found for requested labs within last 72 hours.      Vancomycin IV Administrations (past 72 hours)      No vancomycin orders with administrations in past 72 hours.                Nephrotoxins and other renal medications (From now, onward)    Start     Dose/Rate Route Frequency Ordered Stop    22  vancomycin 1000 mg in 0.9% NaCl 250 mL intermittent infusion 1,000 mg         1,000 mg  over 60 Minutes Intravenous EVERY 24 HOURS 22            Contrast Orders - past 72 hours (72h ago, onward)    None          [employer] Prediction of Planned Initial Vancomycin Regimen  Loading dose: N/A  Regimen: 1000 mg IV every 24 hours.  Start time: 21:19 on 2022  Exposure target: AUC24 (range)400-600 mg/L.hr   AUC24,ss: 439 mg/L.hr  Probability of AUC24 > 400: 61 %  Ctrough,ss: 12.4 mg/L  Probability of Ctrough,ss > 20: 9 %  Probability of nephrotoxicity (Lodise JACLYN ): 8 %          Plan:  1. Start vancomycin  1,000 mg IV q24h.   2. Vancomycin monitoring method: AUC  3. Vancomycin therapeutic monitoring goal: 400-600 mg*h/L  4. Pharmacy will check vancomycin levels as appropriate in 1-3 Days.    5. Serum creatinine levels will be ordered daily for the first week of therapy and at least twice weekly for subsequent weeks.      Cherelle Zapata, [facility]

## 2022-04-28 NOTE — PLAN OF CARE
"End of shift summary- [id number]  Dx: Rhabdo and Sepsis  A/O: Disorientated to, Self, Time, Place and Situation - Dementia hx?   Diet: NPO - until speech consult. To out of it to safely assess today per speech. Will try again tomorrow AM  Fluids: d5 NS at 75/hr  Transfer: Lift A2  Bathroom: Purewick in place. Straight cathed x1 - 600 out.  Pain: denying pain. But was extremely agitated this morning - given haldol x1 prn early morning 1000  Telemetry Monitoring: Yes - tachycardic - sinus 120-160. Metoprolol push given x2 5mg today  Treatment: IVF, IV ABX, BS checks, Blood cultures retaken today. K/Mag both replaced via IV - recheck labs in place for 0600 4/29  Discharge Plans: tbd - was from home     Blood pressure 125/55, pulse (!) 126, temperature (!) 102  F (38.9  C), temperature source Axillary, resp. rate 24, height 1.499 m (4' 11\"), weight 48.7 kg (107 lb 6.4 oz), SpO2 97 %.       Daughter Randall is decision maker - was here this afternoon and spoke with writer and MD Duarte about DNR/I and escalating care status. Daughter declines escalation of care if patient continues her downward trend. Charge notified. See MD Duarte note. Power of  papers copied and placed in paper chart.     Updated daughter randall again around 1830. Given tylenol for 102 temp and metoprolol again for >120 HR      MD Duarte verbal order to leave cath in for 24 hours if we must straight cath again.   "

## 2022-04-28 NOTE — PROGRESS NOTES
[facility]    Hospitalist Progress Note  Provider : Ian Duarte MD, MD  Date of Service (when I saw the patient): 04/28/2022    Assessment & Plan      Acute infectious/metabolic encephalopathy  Left lower lobe pneumonia secondary to aspiration versus CAP  Patient presents with confusion on top of her underlying dementia.  Family reports that she is not quite herself.  She is a poor historian and unable to provide reliable history.  The family is reportedly working on getting her into her memory care unit but there is no clear timeline for this.  Work-up was mostly notable for rhabdomyolysis, leukocytosis.  Work-up for infection is largely unremarkable apart from some hazy opacities in the lung. She was started on IV Ceftriaxone and azithromycin emergency room.  Patient became febrile and was having chills last night.  Vancomycin was added last night.  Repeat chest x-ray done today and showed hazy opacity in the left lung base which could represent developing pneumonia or atelectasis.  -Had a long conversation with patient's daughter power of .  She states that she makes decision on patient's behalf.  She states that patient sometimes has difficulty swallowing.  We will switch antibiotic to IV Zosyn.  We will also continue vancomycin to give broad coverage.  We will monitor vital signs.  Patient's daughter stated that patient is DO NOT RESUSCITATE DO NOT INTUBATE.  She does not want to escalate level of care if patient continues to decline.  Is     Mechanical fall:  -Patient uses a walker at baseline and is independent at home otherwise.   -She reportedly has had 3 falls in the past couple of days which is unusual for her. Most fall seem to be in the setting of getting tangled up in her walker. Family already working on getting in her memory care unit.    -PT/OT  - following     Leukocytosis likely secondary to pneumonia:  -On admission significant leukocytosis  to 34.4 with left shift.  She is febrile.  We will continue broad antibiotic coverage with Zosyn and vancomycin.  Currently blood pressure is stable.      Sinus tachycardia:  She has sinus tachy, currently ranging between 110s to 160s.  She was given metoprolol 5 mg IV.  Continue IV fluid and IV antibiotic to treat underlying cause for tachycardia.    Rhabdomyolysis:  -CK elevated to greater than 1800 on admission.  This is in the setting of mechanical fall and being down for an unknown period of time.  Fortunately she does not have any obvious evidence of acute kidney injury.  -CK total trending down to 1575.  We will continue IV fluid as above     Hypomagnesemia:  -Unclear etiology but I suspect that this is most likely related to poor p.o. intake in the setting of recent falls.  -Magnesium replacement protocol     T2DM  Hyperglycemia:  -Hyperglycemia greater than 200 on presentation..  She takes metformin only prior to arrival.  -Hold metformin  -Continue insulin sliding scale     Hypertension:  -Appears to take metoprolol prior to arrival.  -We will resume once med rec complete     Hyperlipidemia:  -Appears to take atorvastatin prior to arrival.  -We will resume once med rec complete     Dementia: Noted, delirium protocol.  Family working on getting patient into memory care unit.    DVT Prophylaxis: Pneumatic Compression Devices    Code Status: No CPR- Do NOT Intubate.  CODE STATUS was confirmed with patient's daughter.    Disposition: Expected discharge: anticipate at least 2 nights of hospital course.     Ian Duarte MD    Interval History   Patient seen and examined. She has dementia and unable to obtain reliable history. She looks a little agitated. She has no nausea or vomiting.     -Data reviewed today: I reviewed all new labs and imaging results over the last 24 hours. I personally reviewed     Physical Exam   Temp: (!) 101.2  F (38.4  C) Temp src: Axillary BP: 131/56 Pulse: (!) 122   Resp: 24 SpO2:  96 % O2 Device: None (Room air)    Vitals:    04/26/22 1827 04/27/22 0500   Weight: 48.3 kg (106 lb 6.4 oz) 48.7 kg (107 lb 6.4 oz)     Vital Signs with Ranges  Temp:  [98.2  F (36.8  C)-101.9  F (38.8  C)] 101.2  F (38.4  C)  Pulse:  [100-162] 122  Resp:  [16-28] 24  BP: (131-188)/(44-82) 131/56  SpO2:  [92 %-98 %] 96 %  I/O last 3 completed shifts:  In: 2843 [I.V.:2843]  Out: 600 [Urine:600]    GEN:  Alert, oriented x 3, appears comfortable, NAD.  HEENT:  Normocephalic/atraumatic, no scleral icterus, no nasal discharge, mouth moist.  CV:  Regular rate and rhythm, no murmur or JVD.  S1 + S2 noted, no S3 or S4.  LUNGS:  Clear to auscultation bilaterally without rales/rhonchi/wheezing/retractions.  Symmetric chest rise on inhalation noted.  ABD:  Active bowel sounds, soft, non-tender/non-distended.  No rebound/guarding/rigidity.  EXT:  No edema or cyanosis.  Hands/feet warm to touch with good signs of peripheral perfusion.  No joint synovitis noted.  SKIN:  Dry to touch, no exanthems noted in the visualized areas.  NEURO:  Symmetric muscle strength, sensation to touch grossly intact.  No new focal deficits appreciated.    Medications     dextrose 5% and 0.9% NaCl 75 mL/hr at 04/28/22 1359       azithromycin  500 mg Intravenous Q24H     cefTRIAXone  1 g Intravenous Q24H     insulin aspart  1-7 Units Subcutaneous TID AC     insulin aspart  1-5 Units Subcutaneous At Bedtime     sodium chloride (PF)  3 mL Intracatheter Q8H     vancomycin  1,000 mg Intravenous Q24H       Data   Recent Labs   Lab 04/28/22  1247 04/28/22  1125 04/28/22  0652 04/28/22  0215 04/27/22  0833 04/27/22  0638 04/26/22 2015 04/26/22  1956 04/26/22  1212   WBC  --   --  28.3*  --   --  29.2*  --   --  34.4*   HGB  --   --  10.2*  --   --  12.1  --   --  12.0   MCV  --   --  105*  --   --  106*  --   --  100   PLT  --   --  227  --   --  281  --   --  333   NA  --   --  139  --   --  135  --   --  136   POTASSIUM  --  3.8 3.3*  --   --  3.6  --   --   4.0   CHLORIDE  --   --  109  --   --  103  --   --  98   CO2  --   --  22  --   --  23  --   --  20*   BUN  --   --  6*  --   --  10  --   --  17   CR  --   --  0.77  --   --  0.70  --   --  0.96   ANIONGAP  --   --  8  --   --  9  --   --  18   GWENDOLYN  --   --  8.3*  --   --  8.5  --   --  9.6   GLC 70  --  101* 113*   < > 170*   < >  --  275*   ALBUMIN  --   --   --   --   --   --   --  4.0  --    PROTTOTAL  --   --   --   --   --   --   --  7.7  --    BILITOTAL  --   --   --   --   --   --   --  0.7  --    ALKPHOS  --   --   --   --   --   --   --  67  --    ALT  --   --   --   --   --   --   --  40  --    AST  --   --   --   --   --   --   --  84*  --     < > = values in this interval not displayed.       Recent Results (from the past 24 hour(s))   XR Chest Port 1 View    Narrative    CHEST ONE VIEW PORTABLE   4/28/2022 9:46 AM     HISTORY: Shortness of breath    COMPARISON: 3/14/2019      Impression    IMPRESSION: Hazy opacity left lung base could represent developing  pneumonia or atelectasis. No pneumothorax or pleural effusion. Heart  size similar to prior. Left shoulder replacement.    JILLIAN HERNANDEZ MD         SYSTEM ID:  [id number]

## 2022-04-28 NOTE — PROGRESS NOTES
"Paged MD Duarte around 0850 this morning. Patient found gurgling and shivering and SOB in bed. Pt not able to make needs known or put sentences together. Found in the 150's HR and elevated BP SBP in 180's and RR near 30. Temp 98.7. MD in to assess patient around 0900    STAT EKG & Chest Xray & 5mg lopressor IV given and tylenol supp given. Warm blankets given and made patient NPO - speech consult to assess swallowing ability. Noted from report trouble swallowing the oral tylenol. Mits remain on patients hands for lines and safety. LR dc'd and NS to go at 75.     *Sinus tach EKG. Lopressor 5mg given IV and reassessed vitals shown below      Blood pressure (!) 163/53, pulse (!) 130, temperature 98.7  F (37.1  C), temperature source Oral, resp. rate 28, height 1.499 m (4' 11\"), weight 48.7 kg (107 lb 6.4 oz), SpO2 96 %.      "

## 2022-04-28 NOTE — PLAN OF CARE
SLP: Orders received, chart reviewed and case discussed with RN. Approached pt in room, she was repositioned by writer and x2 RN's with very limited alertness/no meaningful responses to positioning or verbal/tactile stimuli. Pt not appropriate for swallow evaluation at this time. Will re-attempt/ check status tomorrow. Recommend maintain NPO status.

## 2022-04-28 NOTE — PROGRESS NOTES
End of Shift Summary  For vital signs and complete assessments, please see documentation flowsheets.     Pertinent assessments: Pt alert, disoriented x 4 this shift. Max temp 99.8, HR tachy, elevated BP, on RA, no signs of pain. On tele monitoring, . Pure wick in place    Major Shift Events: pt restless at times, max temp on previous shift was 101.6, 's, pt received an LR bolus, Lactic was checked, lab unable to get BCs     Treatment Plan: IVF, IV Rocephin, Zithromax, and Vancomycin, BG checks     Bedside Nurse: Gabby Juan RN

## 2022-04-28 NOTE — PROGRESS NOTES
Cross cover note    Patient is now febrile with temperature of 101.6  F with pulse rate of 132.  She is shivering and feeling cold.  Likely getting septic.  She is already on ceftriaxone and azithromycin, will add vancomycin and give 1 L LR bolus followed by LR at 125 mL/h.  We will get EKG, blood cultures, procalcitonin and lactate levels.    Discussed with LISA Fuentes MD  Internal Medicine Hospitalist  Pager: [phone]

## 2022-04-28 NOTE — PROVIDER NOTIFICATION
MD paged regarding pt. Being febrile 101.6  With pulse rate at 130s. MD paged  Back with new orders. Stat lactic acid with blood cultures to be drawn later this shift lab not able to obtain blood flying drwe RN paged waiting, bolus given LR. LR infusing at 125ml.hr.

## 2022-04-28 NOTE — PROGRESS NOTES
Lab still not obtained as per order, MD aware, charge nurse aware, NOC RN aware. Flying squad still not paged back. HR at 116 less tachycardia, LR remains continuous, temp 99.8 after the prn sup tylenol. Follow up will be made on lab  Draws.

## 2022-04-28 NOTE — PROVIDER NOTIFICATION
"Paged MD Duarte about HR again. Pt remains in 150-160's while resting in bed. Tele calling. Still remains sinus tach    *Bladder scanned due to patient straining and tender abd. Verbal order from MD Duarte to straight cath x1. 600ml out    *1300 - paged MD for dextrose IVF. NPO and BS of 70     Blood pressure (!) 155/60, pulse (!) 162, temperature 100.3  F (37.9  C), temperature source Oral, resp. rate 28, height 1.499 m (4' 11\"), weight 48.7 kg (107 lb 6.4 oz), SpO2 96 %.      "

## 2022-04-28 NOTE — PLAN OF CARE
Pertinent assessments: A&O at baseline, Vitals abnormal with Tachycardia Rhythms,  no s/sx for SOB, elevated temp prn sup tylenol given with no relief, MD notified with shivering new orders initiated, PIV intact infusing LR at 125ml/hr, O2 maintained at RA.   Major Shift Events . LR ringer started bolus, followed by continuous infusion LR   Treatment Plan: IV Rocephin, IV Zithromax, IV Vancomycin, IVF.

## 2022-04-29 NOTE — PROVIDER NOTIFICATION
MD paged on pt. Having elevated HR, and pt. Not having IV access, EKG lead ordered, IV to be placed per flying drew GALARZA RN paged pending page back. Charge nurse notified.

## 2022-04-29 NOTE — PHARMACY-VANCOMYCIN DOSING SERVICE
"Pharmacy Vancomycin Note  Date of Service 2022  Patient's  1935   86 year old, female    Indication: Sepsis  Day of Therapy: 3  Current vancomycin regimen:  1000 mg IV q24h  Current vancomycin monitoring method: AUC  Current vancomycin therapeutic monitoring goal: 400-600 mg*h/L    InsightRX Prediction of Current Vancomycin Regimen  Loading dose: N/A  Regimen: 1000 mg IV every 24 hours.  Start time: 21:29 on 2022  Exposure target: AUC24 (range)400-600 mg/L.hr   AUC24,ss: 619 mg/L.hr  Probability of AUC24 > 400: 99 %  Ctrough,ss: 19.6 mg/L  Probability of Ctrough,ss > 20: 47 %  Probability of nephrotoxicity (Lodise JACLYN ): 17 %      Current estimated CrCl = Estimated Creatinine Clearance: 31.7 mL/min (A) (based on SCr of 1.06 mg/dL (H)).    Creatinine for last 3 days  2022:  6:38 AM Creatinine 0.70 mg/dL  2022:  6:52 AM Creatinine 0.77 mg/dL  2022:  4:16 AM Creatinine 1.06 mg/dL    Recent Vancomycin Levels (past 3 days)  2022:  1:20 PM Vancomycin 12.9 mg/L    Vancomycin IV Administrations (past 72 hours)                   vancomycin 1000 mg in 0.9% NaCl 250 mL intermittent infusion 1,000 mg (mg) 1,000 mg New Bag 229     1,000 mg New Bag 22 2230                Nephrotoxins and other renal medications (From now, onward)    Start     Dose/Rate Route Frequency Ordered Stop    22 2000  vancomycin (VANCOCIN) 750 mg in sodium chloride 0.9 % 250 mL intermittent infusion         750 mg  over 60-90 Minutes Intravenous EVERY 24 HOURS 22 1513      22 1600  piperacillin-tazobactam (ZOSYN) infusion 3.375 g        Note to Pharmacy: For [facility], [facility] and [facility]: For Zosyn-naive patients, use the \"Zosyn initial dose + extended infusion\" order panel.    3.375 g  100 mL/hr over 30 Minutes Intravenous EVERY 6 HOURS 22 1539               Contrast Orders - past 72 hours (72h ago, onward)    None          Interpretation of levels and current " regimen:  Vancomycin level is reflective of AUC greater than 600    Has serum creatinine changed greater than 50% in last 72 hours: No    Urine output:  unable to determine    Renal Function: Stable    InsightRX Prediction of Planned New Vancomycin Regimen  AUC-475    Plan:  1. Decrease Dose to 750mg q24h  2. Vancomycin monitoring method: AUC  3. Vancomycin therapeutic monitoring goal: 400-600 mg*h/L  4. Pharmacy will check vancomycin levels as appropriate in 1-3 Days.  5. Serum creatinine levels will be ordered daily for the first week of therapy and at least twice weekly for subsequent weeks.    Sudhakar Smith, [facility]

## 2022-04-29 NOTE — PLAN OF CARE
Assessments: Disoriented, arouses to voice. On room air, Latest K 3.8. No non-verbal indicators of pain. On purewick, voiding well. Had 1x incontinent bowel movement. Refused to eat, .     Major Shift Events: Tylenol given for febrile episode. Latest temp 98.3F. PRN Metoprolol given for tachycardia, with recheck - Per Tele SR 100s (107bpm)    Treatment Plan: IVF, Zosyn, Vanco    Bedside Nurse: Thee Hui RN

## 2022-04-29 NOTE — PLAN OF CARE
[phone]    Assessments: alert, arouses to voice, disoriented x4. HR tachy, On tele monitoring,  Pure wick in place. HS . No SoB.    Treatment Plan: IVF, Zosyn, Vancomycin    Bedside Nurse: Thee Hui RN

## 2022-04-29 NOTE — PROGRESS NOTES
End of Shift Summary  For vital signs and complete assessments, please see documentation flowsheets.     Pertinent assessments: Pt alert, restless at times. Low grade temp, HR tachy, on RA. No signs of pain. BG check 130. Pure wick in place, incont at times, adequate output. On tele monitoring.     Major Shift Event: pt lost PIV around 0430 was unable to finish IV Zosyn, flying squad RN was paged to see if an IV could be placed, unable to place IV. Call from [employer] around 0540 HR in the 130's. Pt has a wound on the outer part of her left foot, redness noted, mepilex placed, informed on coming RN.     Treatment Plan: IVF, IV Zosyn, Vancomycin    Bedside Nurse: Gabby Juan RN

## 2022-04-29 NOTE — PROGRESS NOTES
Hospitalist Medicine Progress Note   [facility]       Nalini Sepulveda is a 86 year old lady with history of dementia, type 2 diabetes mellitus, hypertension, hypercholesteremia, sleep apnea, breast cancer s/p mastectomy came into [facility] 4/26/2022 with fall and altered mentation.  She came from home but her family was looking for a memory care unit.  Patient was diagnosed with rhabdomyolysis in the setting of mechanical fall with fluids at the CPK is decreasing from a peak of 2233.  There was some haziness seen in the chest x-ray was thought to be pneumonia with leukocytosis was started on azithromycin and ceftriaxone which were changed to Zosyn and vancomycin with which WBC count has been decreasing from a peak of 34.4.  Magnesium was low and has been replaced.  Patient's daughter has medical power of  and makes decisions on patient's behalf and the daughter stated that the patient is DNR/DNI.  She wanted care not to be escalated if patient's condition deteriorates.  Due to patient's poor participation swallowing evaluation was unable to be completed they have recommended soft and bite-size diet with thin liquids with the use of swallowing questions with assistance/supervision       Date of Admission:  4/26/2022  Assessment & Plan     Community-acquired pneumonia versus aspiration pneumonia  Metabolic encephalopathy with acute hypoactive delirium on dementia  WBC has been improving on treatment with Zosyn and vancomycin  Will check MRSA screen and discontinue vancomycin if negative  Follow CBC in a.m.  Confusion continues with patient sleeping most of the time though when she wakes up now is answering questions appropriately to me    Sinus tachycardia  Causes unknown probably could be beta-blocker withdrawal  Restart home beta-blockade metoprolol succinate 100 mg daily    Rhabdomyolysis  CPK has been decreasing on IV fluids  Will check CPK and discontinue IV fluids  later    Hypomagnesemia  This has been corrected with magnesium replacement protocol    Type 2 diabetes mellitus  Metformin that the patient takes at home is on hold with treatment with sliding scale insulin    Hypertension  Restart metoprolol  Restart lisinopril when the creatinine has improved    Hypercholesteremia  Takes atorvastatin at home    Dementia  Family working on getting patients into a memory care unit    DNR/DNI            Plan:   Restart metoprolol succinate 100 mg daily and monitor tachycardia  Continue to hold lisinopril 40 mg daily until creatinine improves  Check BMP in the morning    Diet: NPO for Medical/Clinical Reasons Except for: No Exceptions    DVT Prophylaxis: Enoxaparin (Lovenox) SQ  Tenorio Catheter: Not present  Code Status: No CPR- Do NOT Intubate               The patient's care was discussed with the Patient and daughter Anusha.    Bill Goldberg MD  Hospitalist Service  [facility]    ______________________________________________________________________    Interval History     Symptoms   Patient denies any new symptoms was sleeping and was woken up when awake answers questions appropriately    Review of Systems:   As above    Data reviewed today: I reviewed all medications, new labs and imaging results over the last 24 hours.     Physical Exam   Vital Signs: Temp: 98.5  F (36.9  C) Temp src: Oral BP: (!) 160/53 Pulse: (!) 140   Resp: 28 SpO2: 93 % O2 Device: None (Room air)    Weight: 112 lbs 3.2 oz      GENERAL: Patient is not in acute distress  HEENT: EOM+,Conjunctiva is clear   NECK:  no Jugular Venous distention  HEART: S1 S2 sinus tachycardia was present  LUNGS: Respirations are not laboured, Lungs are clear to auscultation without Crepitations or Wheezing   ABDOMEN: Soft , there is no tenderness ,Bowel Sounds are Positive   LOWER LIMBS:  Pedal Edema Bilaterally   CNS: Patient was sleepy though when woken up answered questions appropriately her daughter  feels that she is still sleepy, Moving all the Four Limbs     Data   Recent Labs   Lab 04/29/22  0845 04/29/22  0416 04/29/22  0203 04/28/22  1247 04/28/22  1125 04/28/22  0652 04/27/22  0833 04/27/22  0638 04/26/22 2015 04/26/22  1956   WBC  --  19.1*  --   --   --  28.3*  --  29.2*  --   --    HGB  --  9.8*  --   --   --  10.2*  --  12.1  --   --    MCV  --  110*  --   --   --  105*  --  106*  --   --    PLT  --  233  --   --   --  227  --  281  --   --    NA  --  143  --   --   --  139  --  135  --   --    POTASSIUM  --  3.4  --   --  3.8 3.3*  --  3.6  --   --    CHLORIDE  --  113*  --   --   --  109  --  103  --   --    CO2  --  24  --   --   --  22  --  23  --   --    BUN  --  9  --   --   --  6*  --  10  --   --    CR  --  1.06*  --   --   --  0.77  --  0.70  --   --    ANIONGAP  --  6  --   --   --  8  --  9  --   --    GWENDOLYN  --  8.2*  --   --   --  8.3*  --  8.5  --   --    * 159* 130*   < >  --  101*   < > 170*   < >  --    ALBUMIN  --   --   --   --   --   --   --   --   --  4.0   PROTTOTAL  --   --   --   --   --   --   --   --   --  7.7   BILITOTAL  --   --   --   --   --   --   --   --   --  0.7   ALKPHOS  --   --   --   --   --   --   --   --   --  67   ALT  --   --   --   --   --   --   --   --   --  40   AST  --   --   --   --   --   --   --   --   --  84*    < > = values in this interval not displayed.         Recent Results (from the past 24 hour(s))   XR Chest Port 1 View    Narrative    CHEST ONE VIEW PORTABLE   4/28/2022 9:46 AM     HISTORY: Shortness of breath    COMPARISON: 3/14/2019      Impression    IMPRESSION: Hazy opacity left lung base could represent developing  pneumonia or atelectasis. No pneumothorax or pleural effusion. Heart  size similar to prior. Left shoulder replacement.    JILLIAN HERNANDEZ MD         SYSTEM ID:  [id number]

## 2022-04-29 NOTE — PROGRESS NOTES
"Clinical Swallow Evaluation (CSE):    Recommendations: cautious initiation of full liquids, mildly thick liquids (straws OK, spoon OK), when FULLY alert and upright, 1:1 assist with PO, slow pacing, alternate between solids and liquids. HOLD PO if any overt difficulty or not fully alert.     04/29/22 1037   General Information   Onset of Illness/Injury or Date of Surgery 04/26/22   Referring Physician Dr. Duarte   Patient/Family Therapy Goal Statement (SLP) Pt shook head yes to \"water\" and \"juice\"   Pertinent History of Current Problem   Pt admitted with acute infectious/metabolic encephalopathy, left lower lobe PNA 2/2 aspiration vs CAP, s/p mechanical fall, luekocytosis likely 2/2 PNA, sinus tachycardia, rhabdomyolysis, hypomagnesemia, hyperglycemia, HTN, hyperlipidemia. SLP consulted for dysphagia evaluation given concerns for aspiration.     General Observations   Pt alert and responsive to writer today, limited verbalizations, re-positioned upright with assist from RN, fatigue by end of evaluation, soft mitt hand restraints in place= 1:1 feeding assist     Past History of Dysphagia No hx per EMR review   Pain Assessment   Patient Currently in Pain No   Type of Evaluation   Type of Evaluation Swallow Evaluation   Oral Motor   Oral Musculature unable to assess due to poor participation/comprehension  (no notable weakness/asymmetry/deficits during speech or PO tasks)   Dentition (Oral Motor)   Dentition (Oral Motor) some missing teeth   Vocal Quality/Secretion Management (Oral Motor)   Vocal Quality (Oral Motor)   (very high pitch but clear quality)   Secretion Management (Oral Motor)   (no notable difficulty during session)   General Swallowing Observations   Respiratory Support (General Swallowing Observations) none   Current Diet/Method of Nutritional Intake (General Swallowing Observations, NIS) NPO   Swallowing Evaluation Clinical swallow evaluation   Clinical Swallow Evaluation   Feeding Assistance dependent "   Clinical Swallow Evaluation Textures Trialed thin liquids;mildly thick liquids;pureed   Clinical Swallow Eval: Thin Liquid Texture Trial   Mode of Presentation, Thin Liquids spoon;straw;fed by clinician   Volume of Liquid or Food Presented sips   Oral Phase of Swallow   (reduced labial seal/spillage; mild oral holding/delay)   Pharyngeal Phase of Swallow intact   Clinical Swallow Eval: Mildly Thick Liquids   Mode of Presentation spoon;straw;fed by clinician   Volume Presented 2 oz   Oral Phase   (mild oral holding/swallow delay)   Pharyngeal Phase intact   Clinical Swallow Evaluation: Puree Solid Texture Trial   Mode of Presentation, Puree spoon;fed by clinician   Volume of Puree Presented x6 bites   Oral Phase, Puree   (mild oral holding/swallow delay)   Pharyngeal Phase, Puree intact   Swallowing Recommendations   Diet Consistency Recommendations full liquid diet;mildly thick liquids (level 2)   Supervision Level for Intake 1:1 supervision needed   Swallowing Maneuver Recommendations alternate food and liquid intake   Monitoring/Assistance Required (Eating/Swallowing) stop eating activities when fatigue is present;monitor for cough or change in vocal quality with intake   Recommended Feeding/Eating Techniques (Swallow Eval) maintain upright sitting position for eating;maintain upright posture during/after eating for 30 minutes;provide assist with feeding   Medication Administration Recommendations, Swallowing (SLP) crush with puree   General Therapy Interventions   Planned Therapy Interventions Dysphagia Treatment   Clinical Impression   Criteria for Skilled Therapeutic Interventions Met (SLP Eval) Yes, treatment indicated   SLP Diagnosis mild oral dysphagia   Clinical Impression Comments   Clinical swallow evaluation completed - pt currently presents with mild oral dysphagia 2/2 AMS, question baseline component from dementia. Pt required cues for attention to task. Clinical trials of thin liquids, mildly thick  liquids, puree solids completed. Pt orally accepting alll boluses. Reduced labial seal and anterior spillage with thin liquids - improved oral containment and control with mildly thick. Mild oral holding/percievable swallow delay across consistencies. Question lingual pumping and slow anterior to posterior bolus propulsion with puree solids. Laryngeal elevation noted to palpation. No overt clinical signs/sx aspiration including clear vocal quality. Recommend cautious initiation of PO.     SLP Discharge Planning   SLP Discharge Recommendation Transitional Care Facility   SLP Rationale for DC Rec Pt below baseline for swallow   SLP Brief overview of current status    Full liquids, mildly thick liquids (straws OK), when FULLY alert and upright, 1:1 assist with PO, slow pacing, alternate between solids and liquids. HOLD PO if any overt difficulty or not fully alert.      Total Evaluation Time   Total Evaluation Time (Minutes) 21   SLP Goals   Therapy Frequency (SLP Eval) 5 times/wk   SLP Predicted Duration/Target Date for Goal Attainment 05/06/22   SLP Goals Swallow   SLP: Safely tolerate diet without signs/symptoms of aspiration Soft & bite sized diet;Thin liquids;With use of swallow precautions;With assistance/supervision

## 2022-04-29 NOTE — PLAN OF CARE
Pertinent assessments: A&O at baseline. No s/sx for SOB, pain noted, Vital signs abnormal, elevated BP, HR, prn Lopressor given x1, LS diminished, potasium replacement completed lab to recheck potasium  later this evening. O2 maintained on RA.    Major Shift Events . Diet advanced to combined full liquid with mildly thick diet.   Treatment Plan: IVFs, IV Zosyn, IV Vanco, electrolyte replacement.

## 2022-04-30 NOTE — PLAN OF CARE
Assessments: Disoriented x3, arouses to voice, 2 liters Oxygen with sleep, BP elevated at times, temp max this shift 101.9 axillary--tylenol supp given, lactic 1.3, tele with  at times--MD aware PRN metoprolol available    Major Shift Events: Tylenol supp given for temp 101.9 axillary    Treatment Plan: IVF, Zosyn, Vanco    Bedside Nurse: Etelvina Arellano RN

## 2022-04-30 NOTE — PLAN OF CARE
Pertinent assessments: Alert to self only. Denies pain. Tele SR. VSS. Lungs diminished. RA. Purewick in place. Incont BMx1.  Major Shift Events:   Plan (Upcoming Events):   Discharge/Transfer Needs:     Bedside Shift Report Completed : Y  Bedside Safety Check Completed: Y

## 2022-04-30 NOTE — PROGRESS NOTES
SLP - F/u on PO. Inconsistent tolerance with + cough w liquids by straw. Max cues. Impacted by pt's fatigue.    Cont cautious full liquid, mildly thick. NO straws. Liquids by spoon, 1/2 tsp amnts at very slow rate. Verify swallow before next bite.    SLP to cont per POC.

## 2022-04-30 NOTE — PROGRESS NOTES
X-cover    Notified about tachycardia in this patient with fever and PNA    Has been tachycardic since admit with sinus tachycardia noted    Increased tachycardia this evening related to fever    Provider also noted pt may be having B-blocker withdrawal and recommended resuming home metoprolol dose; this has not yet been done (was in pre-admission orders but not yet released)    Nurse reports pt does not appear any worse tonight; does not appear septic or toxic    Plans  - start metoprolol  - continue current cares  - anticipate HR will improve as fever improves and with resumption of metoprolol

## 2022-04-30 NOTE — PROGRESS NOTES
Hospitalist Medicine Progress Note   [facility]       Nalini Sepulveda is a 86 year old lady with history of dementia, type 2 diabetes mellitus, hypertension, hypercholesteremia, sleep apnea, breast cancer s/p mastectomy came into [facility] 4/26/2022 with fall and altered mentation.  She came from home but her family was looking for a memory care unit.  Patient was diagnosed with rhabdomyolysis in the setting of mechanical fall with fluids at the CPK is decreasing from a peak of 2233.  There was some haziness seen in the chest x-ray was thought to be pneumonia with leukocytosis was started on azithromycin and ceftriaxone which were changed to Zosyn and vancomycin with which WBC count has been decreasing from a peak of 34.4.  Magnesium was low and has been replaced.  Patient's daughter has medical power of  and makes decisions on patient's behalf and the daughter stated that the patient is DNR/DNI.  She wanted care not to be escalated if patient's condition deteriorates.  Due to patient's poor participation swallowing evaluation was unable to be completed they have recommended soft and bite-size diet with thin liquids with the use of swallowing questions with assistance/supervision       Date of Admission:  4/26/2022  Assessment & Plan     Community-acquired pneumonia versus aspiration pneumonia  Metabolic encephalopathy with acute hypoactive delirium on dementia  WBC has been improving on treatment with Zosyn and vancomycin  Will check MRSA screen and discontinue vancomycin if negative  Follow CBC in a.m.  Confusion continues with patient sleeping most of the time though when she wakes up now is answering questions appropriately to me    Sinus tachycardia  Causes unknown probably could be beta-blocker withdrawal  Restart home beta-blockade metoprolol succinate 100 mg daily    Rhabdomyolysis  CPK has been decreasing on IV fluids  Will check CPK and discontinue IV fluids  later    Hypomagnesemia  This has been corrected with magnesium replacement protocol    Type 2 diabetes mellitus  Metformin that the patient takes at home is on hold with treatment with sliding scale insulin    Hypertension  Restart metoprolol  Restart lisinopril when the creatinine has improved    Diarrhea   Will check C diff on treatment with antibiotics     Hypercholesteremia  Takes atorvastatin at home    Dementia  Family working on getting patients into a memory care unit    DNR/DNI            Plan:   Change IV fluids to hypotonic solutions with hyponatremia  Check C. difficile  Check BMP in the morning    Diet: Combination Diet Full Liquid Diet; Mildly Thick (level 2) (by spoon, slow rate, 1/2 tsp amnts)    DVT Prophylaxis: Enoxaparin (Lovenox) SQ  Tenorio Catheter: Not present  Code Status: No CPR- Do NOT Intubate               The patient's care was discussed with the Patient     Bill Goldberg MD  Hospitalist Service  [facility]    ______________________________________________________________________    Interval History     Symptoms   Patient had diarrhea without abdominal pain    Review of Systems:   Denies any other symptoms    Data reviewed today: I reviewed all medications, new labs and imaging results over the last 24 hours.     Physical Exam   Vital Signs: Temp: 97.7  F (36.5  C) Temp src: Oral BP: (!) 143/55 Pulse: 93   Resp: 18 SpO2: 93 % O2 Device: Nasal cannula Oxygen Delivery: 2 LPM  Weight: 112 lbs 3.2 oz      GENERAL: Patient is not in acute distress  HEENT: EOM+,Conjunctiva is clear   NECK:  no Jugular Venous distention  HEART: S1 S2 sinus tachycardia was present  LUNGS: Respirations are not laboured, Lungs are clear to auscultation without Crepitations or Wheezing   ABDOMEN: Soft , there is no tenderness ,Bowel Sounds are Positive   LOWER LIMBS:  Pedal Edema Bilaterally   CNS: Patient was sleepy though when woken up answered questions appropriately her daughter feels that  she is still sleepy, Moving all the Four Limbs     Data   Recent Labs   Lab 04/30/22  1125 04/30/22  0818 04/30/22  0719 04/29/22  1809 04/29/22  1638 04/29/22  0845 04/29/22  0416 04/28/22  1125 04/28/22  0652 04/26/22 2015 04/26/22  1956   WBC  --   --  19.8*  --   --   --  19.1*  --  28.3*   < >  --    HGB  --   --  9.5*  --   --   --  9.8*  --  10.2*   < >  --    MCV  --   --  105*  --   --   --  110*  --  105*   < >  --    PLT  --   --  217  --   --   --  233  --  227   < >  --    NA  --   --  146*  --   --   --  143  --  139   < >  --    POTASSIUM  --   --  3.6  --  3.8  --  3.4   < > 3.3*   < >  --    CHLORIDE  --   --  120*  --   --   --  113*  --  109   < >  --    CO2  --   --  21  --   --   --  24  --  22   < >  --    BUN  --   --  6*  --   --   --  9  --  6*   < >  --    CR  --   --  1.06*  --   --   --  1.06*  --  0.77   < >  --    ANIONGAP  --   --  5  --   --   --  6  --  8   < >  --    GWENDOLYN  --   --  7.8*  --   --   --  8.2*  --  8.3*   < >  --    * 171* 164*   < >  --    < > 159*   < > 101*   < >  --    ALBUMIN  --   --   --   --   --   --   --   --   --   --  4.0   PROTTOTAL  --   --   --   --   --   --   --   --   --   --  7.7   BILITOTAL  --   --   --   --   --   --   --   --   --   --  0.7   ALKPHOS  --   --   --   --   --   --   --   --   --   --  67   ALT  --   --   --   --   --   --   --   --   --   --  40   AST  --   --   --   --   --   --   --   --   --   --  84*    < > = values in this interval not displayed.         No results found for this or any previous visit (from the past 24 hour(s)).

## 2022-04-30 NOTE — PROVIDER NOTIFICATION
Notified Dr. Goldberg: Pt had 8 beats of PSVT this AM with HR of 114 per tele. Gave Metoprolol this AM.  Isaura Carmona RN

## 2022-04-30 NOTE — PLAN OF CARE
Goal Outcome Evaluation:      Patient still having incontinent,loose stools. She did ask where the bathroom is and then asked for the bedpan but was incontinent. Patient pleasantly confused, was able to say name and birthday. Ate most of evening meal. Did get to side of bed on her own.

## 2022-04-30 NOTE — PLAN OF CARE
Goal Outcome Evaluation:    Assessments: Disoriented x3, arouses to voice, 2L NC, BP elevated at times, temp max this shift 99.3 axillary--tylenol given.Tele-ST  with occurrence of 8 beats PSVT--MD aware. Mitts on hands. Incontinent B&B.     Major Shift Events: 3 loose, mucous-like stools this shift. Placed on enteric precautions to r/o c-diff.    Treatment Plan: VON, Zosyn, Stella Carmona RN

## 2022-05-01 NOTE — PROGRESS NOTES
Hospitalist Medicine Progress Note   [facility]       Nalini Sepulveda is a 86 year old lady with history of dementia, type 2 diabetes mellitus, hypertension, hypercholesteremia, sleep apnea, breast cancer s/p mastectomy came into [facility] 4/26/2022 with fall and altered mentation.  She came from home but her family was looking for a memory care unit.  Patient was diagnosed with rhabdomyolysis in the setting of mechanical fall with fluids at the CPK is decreasing from a peak of 2233.  There was some haziness seen in the chest x-ray was thought to be pneumonia with leukocytosis was started on azithromycin and ceftriaxone which were changed to Zosyn and vancomycin with which WBC count has been decreasing from a peak of 34.4.  Magnesium was low and has been replaced.  Patient's daughter has medical power of  and makes decisions on patient's behalf and the daughter stated that the patient is DNR/DNI.  She wanted care not to be escalated if patient's condition deteriorates.  Due to patient's poor participation swallowing evaluation was unable to be completed they have recommended soft and bite-size diet with thin liquids with the use of swallowing questions with assistance/supervision       Date of Admission:  4/26/2022  Assessment & Plan     Community-acquired pneumonia versus aspiration pneumonia  Metabolic encephalopathy with acute hypoactive delirium on dementia  WBC has been improving on treatment with Zosyn and vancomycin  Will check MRSA screen and discontinue vancomycin if negative  Follow CBC in a.m.  Confusion is better    Sinus tachycardia  Causes unknown probably could be beta-blocker withdrawal  Sinus tachycardia is better after starting of beta-blockers    Rhabdomyolysis  CPK has been decreasing on IV fluids    Hypomagnesemia  magnesium replacement protocol    Type 2 diabetes mellitus  Metformin that the patient takes at home is on hold with treatment with  sliding scale insulin    Hypertension  Restart metoprolol  Restart lisinopril when the creatinine has improved    Diarrhea   Negative for C diff on treatment with antibiotics     Hypercholesteremia  Takes atorvastatin at home    Dementia  Family working on getting patients into a memory care unit    DNR/DNI            Plan:   PT and OT  Check BMP in the morning  Replace magnesium    Diet: Combination Diet Full Liquid Diet; Mildly Thick (level 2) (by spoon, slow rate, 1/2 tsp amnts)    DVT Prophylaxis: Enoxaparin (Lovenox) SQ  Tenorio Catheter: Not present  Code Status: No CPR- Do NOT Intubate               The patient's care was discussed with the Patient     Bill Goldberg MD  Hospitalist Service  [facility]    ______________________________________________________________________    Interval History     Symptoms   Denies any new symptoms    Review of Systems:   Denies any other symptoms    Data reviewed today: I reviewed all medications, new labs and imaging results over the last 24 hours.     Physical Exam   Vital Signs: Temp: 99.5  F (37.5  C) Temp src: Oral BP: (!) 144/60 Pulse: 93   Resp: 20 SpO2: 91 % O2 Device: None (Room air)    Weight: 109 lbs 6.4 oz      GENERAL: Patient is not in acute distress  HEENT: EOM+,Conjunctiva is clear   NECK:  no Jugular Venous distention  HEART: S1 S2 RRR  LUNGS: Respirations are not laboured, Lungs are clear to auscultation without Crepitations or Wheezing   ABDOMEN: Soft , there is no tenderness ,Bowel Sounds are Positive   LOWER LIMBS:  Pedal Edema Bilaterally   CNS: Patient was sleepy though when woken up answered questions appropriately her daughter feels that she is still sleepy, Moving all the Four Limbs     Data   Recent Labs   Lab 05/01/22  1235 05/01/22  1145 05/01/22  0822 05/01/22  0547 04/30/22  0818 04/30/22  0719 04/29/22  0845 04/29/22  0416 04/28/22  1125 04/28/22  0652 04/26/22 2015 04/26/22 1956   WBC  --   --   --   --   --  19.8*  --   19.1*  --  28.3*   < >  --    HGB  --   --   --   --   --  9.5*  --  9.8*  --  10.2*   < >  --    MCV  --   --   --   --   --  105*  --  110*  --  105*   < >  --    PLT  --   --   --  235  --  217  --  233  --  227   < >  --    NA  --   --   --  141  --  146*  --  143  --  139   < >  --    POTASSIUM 3.4  --   --  3.1*  --  3.6   < > 3.4   < > 3.3*   < >  --    CHLORIDE  --   --   --  114*  --  120*  --  113*  --  109   < >  --    CO2  --   --   --  24  --  21  --  24  --  22   < >  --    BUN  --   --   --  5*  --  6*  --  9  --  6*   < >  --    CR  --   --   --  1.04  --  1.06*  --  1.06*  --  0.77   < >  --    ANIONGAP  --   --   --  3  --  5  --  6  --  8   < >  --    GWENDOLYN  --   --   --  8.0*  --  7.8*  --  8.2*  --  8.3*   < >  --    GLC  --  139* 154* 150*   < > 164*   < > 159*   < > 101*   < >  --    ALBUMIN  --   --   --   --   --   --   --   --   --   --   --  4.0   PROTTOTAL  --   --   --   --   --   --   --   --   --   --   --  7.7   BILITOTAL  --   --   --   --   --   --   --   --   --   --   --  0.7   ALKPHOS  --   --   --   --   --   --   --   --   --   --   --  67   ALT  --   --   --   --   --   --   --   --   --   --   --  40   AST  --   --   --   --   --   --   --   --   --   --   --  84*    < > = values in this interval not displayed.         No results found for this or any previous visit (from the past 24 hour(s)).

## 2022-05-01 NOTE — PROGRESS NOTES
SLP swallow tx. Pt sleeping soundly; did not disturb. Discussed with RN and nsg assistant. Minimal PO since admission. Effortful for pt to take bites of pudding with variable tolerance given mental status, weakness.     Consider non-oral nutrition/hydration if consistent with goals of care.     SLP will cont per POC 5x/wk as able.

## 2022-05-01 NOTE — PLAN OF CARE
[id number]    Assessments: Arouses to voice. More responsive  compared to yesterday. Confused, disoriented x3. On room air, no SoB. Mitts on hands. Incontinent B&B. BM was watery and seeps in pad. Unable to collect stool. HR on high 90s to low 100s.     Treatment Plan: VON, Zosyn, Vanco    Bedside Nurse: Thee Hui RN

## 2022-05-01 NOTE — PLAN OF CARE
End of Shift Summary  For vital signs and complete assessments, please see documentation flowsheets.    Assessments: Alert to self.  VSS except temp max 99.9 ax, tylenol suppository given.  LS with crackles, on RA and does not appear SOB.  Continues to have watery stools, barrier cream applied.  Unable to collect stool due to consistency of stool.  Mitts on to prevent patient from removing IV.  Up with lift.  .    Treatment Plan: IVF, Zosyn, Vanco    Bedside Nurse: Kahty Garcia RN

## 2022-05-01 NOTE — PLAN OF CARE
Goal Outcome Evaluation:    End of Shift Summary  For vital signs and complete assessments, please see documentation flowsheets.    Assessments: Alert to self. VSS, afebrile. LS with crackles, on RA and does not appear SOB.  Continues to have watery stools, barrier cream applied. Stool sample collected. Mitt on left hand to prevent patient from removing IV. Up with lift. , 139.    Major events: Vanco discontinued. Replaced K+ twice with PO K+, and Mg x1 with IV.     Treatment Plan: IVF, Zosyn, Electrolyte replacement    Isaura Carmoan RN

## 2022-05-02 NOTE — PLAN OF CARE
For vital signs and complete assessments, please see documentation flowsheets.     Pertinent assessments: Confused. Up with lift. VSS. RA. Denies pain. Wound dressings CDI. Rectal tube in place.     Major Shift Events: Uneventful    Treatment Plan: Zosyn, Work with PT/OT, Discharge TBD.

## 2022-05-02 NOTE — PLAN OF CARE
Pertinent assessments: Confused. Up with lift. VSS. RA. Denies pain. Wound dressings CDI. Rectal tube in place.     Major Shift Events: Uneventful    Treatment Plan: Zosyn, Work with PT/OT, Discharge TBD.    Bedside Nurse: Carlene Ng RN

## 2022-05-02 NOTE — PROGRESS NOTES
05/02/22 1553   Quick Adds   Type of Visit Initial Occupational Therapy Evaluation   Living Environment   People in Home alone   Transportation Anticipated family or friend will provide   Living Environment Comments Per chart, pt lives alone in a home; family visits 2x/day, and have cameras in the living area so family can check in on pt virtually   Self-Care   Usual Activity Tolerance moderate   Current Activity Tolerance poor   Equipment Currently Used at Home cane, straight;walker, standard   Fall history within last six months yes   Activity/Exercise/Self-Care Comment per chart, pt uses a walker at baseline, dtr assists with shower 2x/week, pt was indep with self-cares otherwise, son checks that pt has taken meds 2x/day   Instrumental Activities of Daily Living (IADL)   IADL Comments per chart family assist   General Information   Onset of Illness/Injury or Date of Surgery 05/01/22   Referring Physician Bill Goldberg   Patient/Family Therapy Goal Statement (OT) family prefer tcu, and are looking for a MCU following   Additional Occupational Profile Info/Pertinent History of Current Problem 86 year old lady with history of dementia, type 2 diabetes mellitus, hypertension, hypercholesteremia, sleep apnea, breast cancer s/p mastectomy came into [facility] 4/26/2022 with fall and altered mentation.  She came from home but her family was looking for a memory care unit.  Patient was diagnosed with rhabdomyolysis in the setting of mechanical fall   Existing Precautions/Restrictions fall   Left Upper Extremity (Weight-bearing Status) full weight-bearing (FWB)   Right Upper Extremity (Weight-bearing Status) full weight-bearing (FWB)   Left Lower Extremity (Weight-bearing Status) full weight-bearing (FWB)   Right Lower Extremity (Weight-bearing Status) full weight-bearing (FWB)   General Observations and Info activity order: up with assist prn   Cognitive Status Examination   Orientation Status person  "  Affect/Mental Status (Cognitive) confused   Follows Commands follows one-step commands;75-90% accuracy   Memory Deficit severe deficit;short-term memory;long-term memory;immediate recall;recall, biographical information   Cognitive Status Comments TH oriented pt to \"hospital\" 4x in session, pt unable to recall despite cues to look around room for clues; pt able to state name/birthdate, unable to recall which state she was born in   Pain Assessment   Patient Currently in Pain No  (pt stating \"ow\", but denying pain, stating \"I'm cold\")   Posture   Posture forward head position;protracted shoulders;kyphosis   Range of Motion Comprehensive   Comment, General Range of Motion BUE/BLE WFL   Strength Comprehensive (MMT)   Comment, General Manual Muscle Testing (MMT) Assessment BUE/BLE generally 4/5   Coordination   Upper Extremity Coordination No deficits were identified   Bed Mobility   Bed Mobility scooting/bridging;supine-sit;sit-supine   Scooting/Bridging [place] (Bed Mobility) maximum assist (25% patient effort);2 person assist   Supine-Sit [place] (Bed Mobility) moderate assist (50% patient effort)   Sit-Supine [place] (Bed Mobility) moderate assist (50% patient effort);2 person assist   Assistive Device (Bed Mobility) bed rails;draw sheet   Transfers   Transfers sit-stand transfer   Sit-Stand Transfer   Sit-Stand [place] (Transfers) moderate assist (50% patient effort);2 person assist   Sit/Stand Transfer Comments arm-in-arm assist x2, pt unable to fully clear bed   Balance   Balance Comments pt able to sit unsupported momemtarily, generally needing Min-ModA to sit EOB   Activities of Daily Living   BADL Assessment/Intervention lower body dressing;toileting   Lower Body Dressing Assessment/Training   [place] Level (Lower Body Dressing) moderate assist (50% patient effort)   Toileting   Comment, (Toileting) rectal tube   Clinical Impression   Criteria for Skilled Therapeutic Interventions " Met (OT) Yes, treatment indicated   OT Diagnosis impaired ADLs   OT Problem List-Impairments impacting ADL problems related to;activity tolerance impaired;balance;cognition;mobility;range of motion (ROM);strength   Assessment of Occupational Performance 3-5 Performance Deficits   Identified Performance Deficits dressing, bathing, toileting   Planned Therapy Interventions (OT) ADL retraining;cognition;strengthening;transfer training   Clinical Decision Making Complexity (OT) moderate complexity   Risk & Benefits of therapy have been explained evaluation/treatment results reviewed;care plan/treatment goals reviewed;participants included;patient   OT Discharge Planning   OT Discharge Recommendation (DC Rec) Transitional Care Facility;Long term care facility   OT Rationale for DC Rec Pt well below baseline, limited by weakness, decreased balance, and impaired cognition.  She would benefit from continued therapy to increase safety and independence with ADLs, and to reduce caregiver burden. Pt would benefit from TCU to maximize safety/strength, but will likely need MC following due to cognitive deficits and need for 24/7 supervision.   OT Brief overview of current status ModAx2 supine <> sit, ModA LE dressing, ModAx2 sit <> stand, severe memory deficits, alert and answering questions in session   Total Evaluation Time (Minutes)   Total Evaluation Time (Minutes) 5

## 2022-05-02 NOTE — PROGRESS NOTES
Hospitalist Medicine Progress Note   [facility]       Nalini Sepulveda is a 86 year old lady with history of dementia, type 2 diabetes mellitus, hypertension, hypercholesteremia, sleep apnea, breast cancer s/p mastectomy came into [facility] 4/26/2022 with fall and altered mentation.  She came from home but her family was looking for a memory care unit.  Patient was diagnosed with rhabdomyolysis in the setting of mechanical fall with fluids at the CPK is decreasing from a peak of 2233.  There was some haziness seen in the chest x-ray was thought to be pneumonia with leukocytosis was started on azithromycin and ceftriaxone which were changed to Zosyn and vancomycin with which WBC count has been decreasing from a peak of 34.4.  Magnesium was low and has been replaced.  Patient's daughter has medical power of  and makes decisions on patient's behalf and the daughter stated that the patient is DNR/DNI.  She wanted care not to be escalated if patient's condition deteriorates.  Due to patient's poor participation swallowing evaluation was unable to be completed they have recommended soft and bite-size diet with thin liquids with the use of swallowing questions with assistance/supervision       Date of Admission:  4/26/2022  Assessment & Plan     Community-acquired pneumonia versus aspiration pneumonia  Metabolic encephalopathy with acute hypoactive delirium on dementia  WBC has been improving on treatment with Zosyn and vancomycin  Vancomycin was discontinued   Follow CBC in a.m.  Confusion is better    Sinus tachycardia  Causes unknown probably could be beta-blocker withdrawal  Sinus tachycardia is better after starting of beta-blockers    Rhabdomyolysis  CPK has been decreasing on IV fluids    Hypomagnesemia  On magnesium replacement protocol -better    Type 2 diabetes mellitus  Metformin that the patient takes at home is on hold with treatment with sliding scale  insulin    Hypertension  Restart metoprolol  Restart lisinopril when the creatinine has improved    Diarrhea   Negative for C diff on treatment with antibiotics   Will check Stool panel   Start Immodium     Hypercholesteremia  Takes atorvastatin at home    Dementia  Family working on getting patients into a memory care unit    DNR/DNI            Plan:   PT and OT  Check BMP in the morning      Diet: Combination Diet Full Liquid Diet; Mildly Thick (level 2) (by spoon, slow rate, 1/2 tsp amnts)    DVT Prophylaxis: Enoxaparin (Lovenox) SQ  Tenorio Catheter: Not present  Code Status: No CPR- Do NOT Intubate               The patient's care was discussed with the Patient     Bill Goldberg MD  Hospitalist Service  [facility]    ______________________________________________________________________    Interval History     Symptoms   Denies any new symptoms - looks somw what confused today     Review of Systems:   Denies any other symptoms    Data reviewed today: I reviewed all medications, new labs and imaging results over the last 24 hours.     Physical Exam   Vital Signs: Temp: (P) 98.3  F (36.8  C) Temp src: (P) Oral BP: (P) 137/61 Pulse: (P) 108   Resp: (P) 20 SpO2: (P) 96 % O2 Device: (P) None (Room air)    Weight: 109 lbs 3.2 oz      GENERAL: Patient is not in acute distress  HEENT: EOM+,Conjunctiva is clear   NECK:  no Jugular Venous distention  HEART: S1 S2 RRR  LUNGS: Respirations are not laboured, Lungs are clear to auscultation without Crepitations or Wheezing   ABDOMEN: Soft , there is no tenderness ,Bowel Sounds are Positive   LOWER LIMBS:  Pedal Edema Bilaterally   CNS: Patient was sleepy though when woken up answered questions appropriately her daughter feels that she is still sleepy, Moving all the Four Limbs     Data   Recent Labs   Lab 05/02/22  0913 05/02/22  0749 05/02/22  0231 05/02/22  0103 05/01/22  2132 05/01/22  1836 05/01/22  0822 05/01/22  0547 04/30/22  0818 04/30/22  0719  04/29/22  0845 04/29/22  0416 04/26/22 2015 04/26/22  1956   WBC  --  13.6*  --   --   --   --   --   --   --  19.8*  --  19.1*   < >  --    HGB  --  9.2*  --   --   --   --   --   --   --  9.5*  --  9.8*   < >  --    MCV  --  110*  --   --   --   --   --   --   --  105*  --  110*   < >  --    PLT  --  302  --   --   --   --   --  235  --  217  --  233   < >  --    NA  --  133  --   --   --   --   --  141  --  146*  --  143   < >  --    POTASSIUM  --  3.8  --  3.8  --  3.3*   < > 3.1*  --  3.6   < > 3.4   < >  --    CHLORIDE  --  107  --   --   --   --   --  114*  --  120*  --  113*   < >  --    CO2  --  21  --   --   --   --   --  24  --  21  --  24   < >  --    BUN  --  7  --   --   --   --   --  5*  --  6*  --  9   < >  --    CR  --  1.11*  --   --   --   --   --  1.04  --  1.06*  --  1.06*   < >  --    ANIONGAP  --  5  --   --   --   --   --  3  --  5  --  6   < >  --    GWENDOLYN  --  8.1*  --   --   --   --   --  8.0*  --  7.8*  --  8.2*   < >  --    * 141* 143*  --    < >  --    < > 150*   < > 164*   < > 159*   < >  --    ALBUMIN  --   --   --   --   --   --   --   --   --   --   --   --   --  4.0   PROTTOTAL  --   --   --   --   --   --   --   --   --   --   --   --   --  7.7   BILITOTAL  --   --   --   --   --   --   --   --   --   --   --   --   --  0.7   ALKPHOS  --   --   --   --   --   --   --   --   --   --   --   --   --  67   ALT  --   --   --   --   --   --   --   --   --   --   --   --   --  40   AST  --   --   --   --   --   --   --   --   --   --   --   --   --  84*    < > = values in this interval not displayed.         No results found for this or any previous visit (from the past 24 hour(s)).

## 2022-05-02 NOTE — PLAN OF CARE
[id number].  End of Shift Summary  For vital signs and complete assessments, please see documentation flowsheets.     Pertinent assessments: VSS. Afebrile. LS diminished. BS x4. Pt denies pain and nausea. Full Liquid diet, mildly thick. Ax2 with Lift to move, turning as patient tolerates. PIV - D5 @ 75 ml/hr. Rectal Tube in place with 200 ml of green/yellowish output. Blanchable redness on perineum, buttocks/IT, and sacrum/coccyx; applying cream when needed. Pt confused, only orientated to self; alarm on bed for safety. Resting comfortably in bed this evening.      Major Shift Events: Potassium level 3.3, replacement initiated, lab recheck due at 5/2/2022 @ 0100.     Treatment Plan: Zosyn, Work with PT/OT, Discharge TBD.

## 2022-05-03 NOTE — PLAN OF CARE
End of Shift Summary  For vital signs and complete assessments, please see documentation flowsheets.     Pertinent assessments: Afebrile. BP elevated, Tachy in low 100s. LS diminished. BS x4, pt is having diarrhea, rectal tube in place. Denies pain or nausea. Full Liquid diet, mildly thickened. Ax2 with Lift to move, weight-shifting provided. PIV - D5 @ 75 ml/hr. Multiple wounds throughout body, covered with dressings, CDI. Pt remains confused but is more alert, only orientated to self; alarm on bed for safety. Slept well.     Major Shift Events: none    Treatment Plan: Zosyn, Continue to work with PT and OT, Discharge TBD.

## 2022-05-03 NOTE — PLAN OF CARE
[id number]    Assessments: Alert to self. Shows improvement of mental status. No SoB. Repositioned as patient tolerates. Up with lift. Denies pain. Rectal tube in place. HS .     Major shift events: enteric panel negative    Treatment Plan: Ray, PT/OT, Discharge TBD.    Thee Hui RN

## 2022-05-03 NOTE — PLAN OF CARE
For vital signs and complete assessments, please see documentation flowsheets.     Pertinent assessments: A&O x1,  pt is having diarrhea, Denies pain combination diet pureed/thin liquid. Ax2 with Lift to move. Multiple wounds throughout body, covered with dressings, Patient is still confused but is alert throughout the day, only orientated to self.     Major Shift Events: rectal tube came out    Treatment Plan: Zosyn, Continue to work with PT and OT, Discharge TBD.

## 2022-05-03 NOTE — PROGRESS NOTES
"CLINICAL NUTRITION SERVICES  -  ASSESSMENT NOTE    Recommendations Ordered by Registered Dietitian (RD):   Diet per SLP --> will not likely be able to meet nutritional needs through current diet order, GOC?  Ordered Ensure Enlive with meals TID    Malnutrition:   % Weight Loss:  None noted  % Intake:  </= 50% for >/= 5 days (severe malnutrition)  Subcutaneous Fat Loss:  Unable to assess - visual only   Muscle Loss:  Temporal region mild-moderate depletion - visual only   Fluid Retention:  None noted    Malnutrition Diagnosis: Unable to determine due to lack of nutrition focused physical exam as pt sleeping without waking to voice      REASON FOR ASSESSMENT  Nalini Sepulveda is a 86 year old female seen by Registered Dietitian for LOS    NUTRITION HISTORY  - Information obtained from chart, of note pt is oriented to self only   - History of  breast cancer status postmastectomy, dementia, diabetes mellitus,, hypertension, hyperlipidemia, sleep apnea   - Patient admitted for AMS and mechanical fall  - Unable to obtain nutrition history from patient at this time as pt sleeping and only oriented to self   - Food allergies: NKFA    CURRENT NUTRITION ORDERS  Diet Order:     Full Liquids - Mildly Thick (level 2) Liquids - by spoon, slowrate, 1/2 tsp amounts    Current Intake/Tolerance:  Upon review of the flowsheets, pt is consuming 0-100% of meals ordered.     NUTRITION FOCUSED PHYSICAL ASSESSMENT FOR DIAGNOSING MALNUTRITION)  Yes - visual only , pt sleeping without waking     Obtained from Chart/Interdisciplinary Team:  - SLP following     ANTHROPOMETRICS  Height: 4' 11\"  Weight: 49.6 kg ( 109 lbs 6.4 oz)   Body mass index is 22.1 kg/m .  Weight Status:  Normal BMI  Weight History: weight fairly stable   Wt Readings from Last 10 Encounters:   05/03/22 49.6 kg (109 lb 6.4 oz)   04/26/22 49 kg (108 lb)   02/28/22 47.2 kg (104 lb)   02/03/22 44.9 kg (99 lb)   01/11/22 48 kg (105 lb 14.4 oz)   01/05/22 49 kg (108 lb)   12/01/21 " 49.9 kg (110 lb)   06/02/21 46.7 kg (103 lb)   12/29/20 48.1 kg (106 lb)   12/15/20 49.9 kg (110 lb)     LABS  Labs reviewed    Labs:  Electrolytes  Potassium (mmol/L)   Date Value   05/03/2022 3.4   05/02/2022 3.8   05/02/2022 3.8    Blood Glucose  Glucose (mg/dL)   Date Value   05/03/2022 136 (H)   05/02/2022 141 (H)   05/01/2022 150 (H)   04/30/2022 164 (H)   04/29/2022 159 (H)     GLUCOSE BY METER POCT (mg/dL)   Date Value   05/03/2022 140 (H)   05/03/2022 167 (H)   05/02/2022 169 (H)   05/02/2022 133 (H)   05/02/2022 145 (H)     Hemoglobin A1C (%)   Date Value   12/01/2021 6.6 (H)   10/20/2020 6.6 (H)   02/27/2020 6.6 (H)   11/22/2019 7.0 (H)   02/12/2019 8.8 (H)    Inflammatory Markers  CRP (mg/dL)   Date Value   03/14/2019 0.9 (H)     WBC Count (10e3/uL)   Date Value   05/02/2022 13.6 (H)   04/30/2022 19.8 (H)   04/29/2022 19.1 (H)     Albumin (g/dL)   Date Value   04/26/2022 4.0   01/11/2022 4.7   06/02/2021 4.3      Magnesium (mg/dL)   Date Value   05/02/2022 1.8   05/01/2022 1.5 (L)   04/30/2022 1.6     Sodium (mmol/L)   Date Value   05/03/2022 128 (L)   05/02/2022 133   05/01/2022 141    Renal  Urea Nitrogen (mg/dL)   Date Value   05/03/2022 6 (L)   05/02/2022 7   05/01/2022 5 (L)     Creatinine (mg/dL)   Date Value   05/03/2022 0.96   05/02/2022 1.11 (H)   05/01/2022 1.04     Additional  Triglycerides (mg/dL)   Date Value   02/27/2020 131   08/17/2016 54   04/23/2015 107     Ketones Urine (mg/dL)   Date Value   04/26/2022 60  (A)        MEDICATIONS  Medications reviewed    enoxaparin ANTICOAGULANT  30 mg Subcutaneous Q24H     insulin aspart  1-7 Units Subcutaneous TID AC     insulin aspart  1-5 Units Subcutaneous At Bedtime     metoprolol succinate ER  100 mg Oral Daily     piperacillin-tazobactam  2.25 g Intravenous Q6H     sodium chloride (PF)  3 mL Intracatheter Q8H        D5W 75 mL/hr at 05/03/22 0207      acetaminophen, [DISCONTINUED] acetaminophen **OR** acetaminophen, benztropine, glucose **OR**  dextrose **OR** glucagon, haloperidol lactate, hydrALAZINE, lidocaine 4%, lidocaine (buffered or not buffered), melatonin, metoprolol, ondansetron **OR** ondansetron, prochlorperazine **OR** prochlorperazine **OR** prochlorperazine, senna-docusate **OR** senna-docusate, sodium chloride (PF)     ASSESSED NUTRITION NEEDS PER APPROVED PRACTICE GUIDELINES:    Dosing Weight 49.6 kg   Estimated Energy Needs: 2994-0815 kcals (25-30 Kcal/Kg)  Justification: maintenance  Estimated Protein Needs: 60-74 grams protein (1.2-1.5 g pro/Kg)  Justification: preservation of lean body mass  Estimated Fluid Needs: per MD    MALNUTRITION:  % Weight Loss:  None noted  % Intake:  </= 50% for >/= 5 days (severe malnutrition)  Subcutaneous Fat Loss:  Unable to assess - visual only   Muscle Loss:  Temporal region mild-moderate depletion - visual only   Fluid Retention:  None noted    Malnutrition Diagnosis: Unable to determine due to lack of nutrition focused physical exam as pt sleeping without waking to voice     NUTRITION DIAGNOSIS:  Inadequate oral intake related to restricted diet order (mildly thick) full liquids by teaspoon with dysphagia as evidenced by pt likely consuming <50% of nutritional needs throughout admission    NUTRITION INTERVENTIONS  Recommendations / Nutrition Prescription  Diet per SLP --> will not likely be able to meet nutritional needs through current diet order, GOC?  Ordered Ensure Enlive with meals TID     Implementation  Nutrition education: Not appropriate at this time due to patient condition  Medical Food Supplement: as above  Collaboration and Referral of Nutrition care: discussed pt during interdisciplinary rounds this morning     Nutrition Goals  Diet advancement >mildly thick full liquids vs GOC?     MONITORING AND EVALUATION:  Progress towards goals will be monitored and evaluated per protocol and Practice Guidelines    Nakita Celestin, RD, LD  Clinical Dietitian

## 2022-05-03 NOTE — CONSULTS
[facility] Nurse Inpatient Assessment     Today's Assessment: Left lateral foot, left shin, lips    Patient History (according to provider note(s):      Nalini Sepulveda is a 86 year old lady with history of dementia, type 2 diabetes mellitus, hypertension, hypercholesteremia, sleep apnea, breast cancer s/p mastectomy came into [facility] 4/26/2022 with fall and altered mentation.  She came from home but her family was looking for a memory care unit.  Patient was diagnosed with rhabdomyolysis in the setting of mechanical fall with fluids at the CPK is decreasing from a peak of 2233.  There was some haziness seen in the chest x-ray was thought to be pneumonia with leukocytosis was started on azithromycin and ceftriaxone which were changed to Zosyn and vancomycin with which WBC count has been decreasing from a peak of 34.4.  Magnesium was low and has been replaced.  Patient's daughter has medical power of  and makes decisions on patient's behalf and the daughter stated that the patient is DNR/DNI.  She wanted care not to be escalated if patient's condition deteriorates.  Due to patient's poor participation swallowing evaluation was unable to be completed they have recommended soft and bite-size diet with thin liquids with the use of swallowing questions with assistance/supervision    AREAS ASSESSED:    Areas visualized during today's visit: Focused: left lateral foot, left shin, lips    Pressure Injury Location: Left lateral foot    Last photo: 5/3/22      Wound type: Pressure Injury     Pressure Injury Stage: Unstageable, present on admission   Wound history/plan of care:   Patient was found down at home. DM2    Wound base: 100 % slough     Palpation of the wound bed: firm, expected over inocencia prominence      Drainage: scant     Description of drainage: serosanguinous     Measurements (length x width x depth, in cm) 1  x 1.5  x  +0.1 cm      Tunneling N/A      Undermining N/A  Periwound skin: Erythema- blanchable      Color: pink      Temperature: normal   Odor: none  Pain: absent and denies , none  Pain intervention prior to dressing change: patient tolerated well  Treatment goal: Keep clean, need VALERIE in order to determine to attempt to debride or encourage eschar formation.   STATUS: initial assessment  Supplies ordered: discussed with RN and discussed with patient     Wound Location: Left shin    Last photo: 5/3/22    Wound due to: Skin Tear  Wound history/plan of care:   Patient with fragile skin and fall prior to admision  Wound base: 100 % dermis 75% reapproximation of skin flap     Palpation of the wound bed: normal      Drainage: scant     Description of drainage: serosanguinous     Measurements (length x width x depth, in cm) 0.4  x 1  x  0.1 cm      Tunneling N/A     Undermining N/A  Periwound skin: Ecchymosis      Color: normal and consistent with surrounding tissue      Temperature: normal   Odor: none  Pain: absent and denies , none  Pain interventions prior to dressing change: patient tolerated well  Treatment goal: Heal   STATUS: initial assessment  Supplies ordered: supplies stored on unit, discussed with RN and discussed with patient      Wound Location: Lips    Last photo: 5/3/22    Wound due to: Trauma  Wound history/plan of care:   Patient fell prior to admission with noted bruising for face and lips on admission  Wound base: 100 % dry drainage     Palpation of the wound bed: normal      Drainage: scant     Description of drainage: dried     Measurements (length x width x depth, in cm) scattered over both lips     Tunneling N/A     Undermining N/A  Periwound skin: Intact      Color: normal and consistent with surrounding tissue      Temperature: normal   Odor: none  Pain: absent and denies , none  Pain interventions prior to dressing change: patient tolerated well  Treatment goal: Heal   STATUS: initial assessment  Supplies ordered: supplies stored on  "unit, discussed with RN and discussed with patient       TREATMENT PLAN:     Left lateral foot wound(s): Every other day   1. Clean wound with saline or MicroKlenz Spray, pat dry  2. Wipe / \"clean\" the surrounding periwound tissue with skin prep (Cavilon No Sting Skin Prep #[id number]) and allow to dry. This will help protect periwound and help dressing adherence  3. Press a Mepilex 4x4 to the area, making sure to conform nicely to skin curvatures.   4. Time and date dressing change  5. Ensure that area is floating at all times and no pressure applied.    Lip wounds: Every shift.  1. Provide good oral cares with lip moisturizer application.    Left shin wound: Q3D  1. Cleanse wound with wound cleanser. Pat dry with gauze.  2. Apply oil emulsion gauze (Adaptic) to wound.  3. Secure with Mepilex  4. Time and date dressing.    RECOMMEND PRIMARY TEAM ORDER: None, at this time  Education provided to: importance of repositioning, plan of care, Moisture management, Hygiene and Off-loading pressure  Discussed plan of care with: Patient and Nurse, sent WBP to Dr. Magallanes to discuss patient. Plan of care for foot.  WOC Nurse follow-up plan:will follow up later this week to determine final plan of care for foot  Notify WOC if wound(s) deteriorate.  Nursing to notify the Provider(s) and re-consult the WOC Nurse if new skin concern.    DATA:     Current support surface: Standard  Atmos Air mattress  Containment of urine/stool: Brief and Incontinent pad in bed  BMI: Body mass index is 22.1 kg/m .   Active Diet Order: Orders Placed This Encounter      Combination Diet Pureed Diet (level 4); Thin Liquids (level 0)     Output: I/O last 3 completed shifts:  In: 2632 [P.O.:80; I.V.:2552]  Out: 2150 [Urine:550; Stool:1600]   Labs:   Recent Labs   Lab 05/02/22  0749 04/27/22  0638 04/26/22 1956   ALBUMIN  --   --  4.0   HGB 9.2*   < >  --    WBC 13.6*   < >  --     < > = values in this interval not displayed.     Pressure Injury Risk " Assessment:   David Risk Assessment  Sensory Perception: 3-->slightly limited  Moisture: 1-->constantly moist  Activity: 1-->bedfast  Mobility: 2-->very limited  Nutrition: 2-->probably inadequate  Friction and Shear: 1-->problem  David Score: 10    Tsering Figueroa RN CWOCN  Dept. Pager: [phone]  Dept. Office Number: [phone]

## 2022-05-03 NOTE — PROGRESS NOTES
[facility]  Hospitalist Progress Note  Stoney Magallanes MD  05/03/2022    Assessment & Plan   Nalini Sepulveda is a 86 year old lady with history of dementia, type 2 diabetes mellitus, hypertension, hypercholesteremia, sleep apnea, breast cancer s/p mastectomy came into [facility] 4/26/2022 with fall and altered mentation.  She came from home but her family was looking for a memory care unit.  Patient was diagnosed with rhabdomyolysis in the setting of mechanical fall with fluids at the CPK is decreasing from a peak of 2233.  There was some haziness seen in the chest x-ray was thought to be pneumonia with leukocytosis was started on azithromycin and ceftriaxone which were changed to Zosyn and vancomycin with which WBC count has been decreasing from a peak of 34.4.  Magnesium was low and has been replaced.  Patient's daughter has medical power of  and makes decisions on patient's behalf and the daughter stated that the patient is DNR/DNI.  She wanted care not to be escalated if patient's condition deteriorates.  Due to patient's poor participation swallowing evaluation was unable to be completed they have recommended soft and bite-size diet with thin liquids with the use of swallowing questions with assistance/supervision       Date of Admission:  4/26/2022       Community-acquired pneumonia versus aspiration pneumonia  Metabolic encephalopathy with acute hypoactive delirium on dementia  WBC has been improving on treatment with Zosyn and vancomycin  Vancomycin was discontinued   WBC coming down  Confusion is better     Left foot ulcers  - woc consult appreciated  - VALERIE to check for PAD    Sinus tachycardia  Causes unknown probably could be beta-blocker withdrawal  Sinus tachycardia is better after starting of beta-blockers     Rhabdomyolysis  CPK has been decreasing with IV fluids  Will recheck last CK 4/28 and she is currently still on  "IVF     Hyponatremia  Hypomagnesemia  On magnesium replacement protocol -better  Patient has been on hypotonic fluids 5% dextrose for a number of days, will discontinue and expect her Na should improve     Type 2 diabetes mellitus  Metformin that the patient takes at home is on hold with treatment with sliding scale insulin     Hypertension  continue metoprolol  Restart lisinopril       Diarrhea   Negative for C diff on treatment with antibiotics   given Immodium and improved  Rectal tube came out today, will leave it off     Hypercholesteremia  -continue atorvastatin at home     Dementia  Family working on getting patients into a memory care unit     DNR/DNI     Plan:   PT and OT  VALERIE     Diet: Combination Diet Full Liquid Diet; Mildly Thick (level 2) (by spoon, slow rate, 1/2 tsp amnts)    DVT Prophylaxis: Enoxaparin (Lovenox) SQ  Tenorio Catheter: Not present  Code Status: No CPR- Do NOT Intubate           Interval History   -- chart reviewed  -- discussed with wound care regarding debridement of wound     -Data reviewed today: I reviewed all new labs and imaging over the last 24 hours. I personally reviewed no images or EKG's today.    Physical Exam    , Blood pressure (!) 145/64, pulse 98, temperature 97.7  F (36.5  C), temperature source Axillary, resp. rate 20, height 1.499 m (4' 11\"), weight 49.6 kg (109 lb 6.4 oz), SpO2 97 %.  Vitals:    05/01/22 0600 05/02/22 0522 05/03/22 0617   Weight: 49.6 kg (109 lb 6.4 oz) 49.5 kg (109 lb 3.2 oz) 49.6 kg (109 lb 6.4 oz)     Vital Signs with Ranges  Temp:  [97.7  F (36.5  C)-99  F (37.2  C)] 97.7  F (36.5  C)  Pulse:  [] 98  Resp:  [16-20] 20  BP: (130-176)/(56-97) 145/64  SpO2:  [93 %-97 %] 97 %  I/O's Last 24 hours  I/O last 3 completed shifts:  In: 2552 [I.V.:2552]  Out: 950 [Urine:350; Stool:600]    Constitutional: arouses no apparent distress, lips bruised  Respiratory: Clear to auscultation bilaterally, no crackles or wheezing  Cardiovascular: Regular rate " and rhythm, normal S1 and S2, and no murmur noted  GI: Normal bowel sounds, soft, non-distended, non-tender  Skin/Integumen: No rashes, no cyanosis, no edema  Other:  left lateral foot wounds    Medications   All medications were reviewed.    D5W 75 mL/hr at 05/03/22 0207       enoxaparin ANTICOAGULANT  30 mg Subcutaneous Q24H     insulin aspart  1-7 Units Subcutaneous TID AC     insulin aspart  1-5 Units Subcutaneous At Bedtime     metoprolol succinate ER  100 mg Oral Daily     piperacillin-tazobactam  2.25 g Intravenous Q6H     sodium chloride (PF)  3 mL Intracatheter Q8H        Data   Recent Labs   Lab 05/03/22  1306 05/03/22  0802 05/03/22  0636 05/02/22  0913 05/02/22  0749 05/02/22  0231 05/02/22  0103 05/01/22  0822 05/01/22  0547 04/30/22  0818 04/30/22  0719 04/29/22  0845 04/29/22  0416 04/26/22 2015 04/26/22 1956   WBC  --   --   --   --  13.6*  --   --   --   --   --  19.8*  --  19.1*   < >  --    HGB  --   --   --   --  9.2*  --   --   --   --   --  9.5*  --  9.8*   < >  --    MCV  --   --   --   --  110*  --   --   --   --   --  105*  --  110*   < >  --    PLT  --   --   --   --  302  --   --   --  235  --  217  --  233   < >  --    NA  --   --  128*  --  133  --   --   --  141  --  146*  --  143   < >  --    POTASSIUM  --   --  3.4  --  3.8  --  3.8   < > 3.1*  --  3.6   < > 3.4   < >  --    CHLORIDE  --   --  98  --  107  --   --   --  114*  --  120*  --  113*   < >  --    CO2  --   --  21  --  21  --   --   --  24  --  21  --  24   < >  --    BUN  --   --  6*  --  7  --   --   --  5*  --  6*  --  9   < >  --    CR  --   --  0.96  --  1.11*  --   --   --  1.04  --  1.06*  --  1.06*   < >  --    ANIONGAP  --   --  9  --  5  --   --   --  3  --  5  --  6   < >  --    GWENDOLYN  --   --  8.0*  --  8.1*  --   --   --  8.0*  --  7.8*  --  8.2*   < >  --    * 140* 136*   < > 141*   < >  --    < > 150*   < > 164*   < > 159*   < >  --    ALBUMIN  --   --   --   --   --   --   --   --   --   --   --   --    --   --  4.0   PROTTOTAL  --   --   --   --   --   --   --   --   --   --   --   --   --   --  7.7   BILITOTAL  --   --   --   --   --   --   --   --   --   --   --   --   --   --  0.7   ALKPHOS  --   --   --   --   --   --   --   --   --   --   --   --   --   --  67   ALT  --   --   --   --   --   --   --   --   --   --   --   --   --   --  40   AST  --   --   --   --   --   --   --   --   --   --   --   --   --   --  84*    < > = values in this interval not displayed.       No results found for this or any previous visit (from the past 24 hour(s)).    Stoney Magallanes MD  Text Page  (7am to 6pm)

## 2022-05-03 NOTE — PROGRESS NOTES
05/03/22 1415   Quick Adds   Type of Visit Initial PT Evaluation   Living Environment   People in Home alone   Current Living Arrangements house   Home Accessibility other (see comments)  (patient unable to answer; family not present)   Transportation Anticipated family or friend will provide   Living Environment Comments Per chart, pt lives alone in a home; family visits 2x/day, and have cameras in the living area so family can check in on pt virtually   Self-Care   Usual Activity Tolerance moderate   Current Activity Tolerance poor   Equipment Currently Used at Home cane, straight;walker, rolling   Fall history within last six months yes   Number of times patient has fallen within last six months   (at least 1; unknown if there are others)   Activity/Exercise/Self-Care Comment per chart, pt uses a walker at baseline, dtr assists with shower 2x/week, pt was indep with self-cares otherwise, son checks that pt has taken meds 2x/day   General Information   Onset of Illness/Injury or Date of Surgery 04/26/22   Referring Physician Bill Goldberg MD   Patient/Family Therapy Goals Statement (PT) not stated   Pertinent History of Current Problem (include personal factors and/or comorbidities that impact the POC) per chart: Nalini Sepulveda is a 86 year old lady with history of dementia, type 2 diabetes mellitus, hypertension, hypercholesteremia, sleep apnea, breast cancer s/p mastectomy came into [facility] 4/26/2022 with fall and altered mentation.  She came from home but her family was looking for a memory care unit.  Patient was diagnosed with rhabdomyolysis in the setting of mechanical fall with fluids at the CPK is decreasing from a peak of 2233.  There was some haziness seen in the chest x-ray was thought to be pneumonia with leukocytosis was started on azithromycin and ceftriaxone which were changed to Zosyn and vancomycin with which WBC count has been decreasing from a peak of 34.4.  Magnesium was low  and has been replaced.  Patient's daughter has medical power of  and makes decisions on patient's behalf and the daughter stated that the patient is DNR/DNI.  She wanted care not to be escalated if patient's condition deteriorates; see medical record for further information   Existing Precautions/Restrictions fall   General Observations patient in bed; sleepy   Cognition   Affect/Mental Status (Cognition) low arousal/lethargic   Orientation Status (Cognition) person   Follows Commands (Cognition) 50-74% accuracy   Safety Deficit (Cognition) ability to follow commands;safety precautions follow-through/compliance;insight into deficits/self-awareness   Cognitive Status Comments appears only oriented to self   Pain Assessment   Patient Currently in Pain Yes, see Vital Sign flowsheet  (L knee in standing)   Posture    Posture Comments significant forward flexion in sitting/standing; leans R   Range of Motion (ROM)   ROM Comment WFL   Strength (Manual Muscle Testing)   Strength Comments significant functional weakness noted with mobility; able to lift UE's and LE's against gravity; more difficulty lifting R LE than L   Bed Mobility   Comment, (Bed Mobility) mod A x 2/max A x 1 for supine to sit   Transfers   Comment, (Transfers) mod/max A x 2 to stand with Sharon Steady   Gait/Stairs (Locomotion)   Comment, (Gait/Stairs) currently unable to tolerate ambulation   Balance   Balance Comments impaired sitting and standing balance   Clinical Impression   Criteria for Skilled Therapeutic Intervention Yes, treatment indicated   PT Diagnosis (PT) impaired functional mobility   Influenced by the following impairments impaired balance; weakness; impaired cognition; poor activity tolerance   Functional limitations due to impairments impaired independence with functional mobility and cares secondary to above deficits   Clinical Presentation (PT Evaluation Complexity) Evolving/Changing   Clinical Presentation Rationale clinical  judgement; level of assist   Clinical Decision Making (Complexity) moderate complexity   Planned Therapy Interventions (PT) gait training;strengthening;transfer training;progressive activity/exercise;bed mobility training;balance training   Anticipated Equipment Needs at Discharge (PT) walker, rolling;lift device;wheelchair   Risk & Benefits of therapy have been explained evaluation/treatment results reviewed;care plan/treatment goals reviewed;risks/benefits reviewed;current/potential barriers reviewed;participants included;patient   Clinical Impression Comments patient appears significantly   PT Discharge Planning   PT Discharge Recommendation (DC Rec) Transitional Care Facility   PT Rationale for DC Rec Patient significantly below reported baseline level of function and would benefit from ongoing PT to maximize return to PLOF; Likely will need a more supportive living environment after; family has been looking into memory care per chart   PT Brief overview of current status A x 2 with Sharon Ribeiro   Plan of Care Review   Plan of Care Reviewed With patient;other (see comments)  (Nurse)   Total Evaluation Time   Total Evaluation Time (Minutes) 10   Physical Therapy Goals   PT Frequency 5x/week   PT Predicted Duration/Target Date for Goal Attainment 05/07/22   PT Goals Bed Mobility;Transfers;Gait   PT: Bed Mobility Minimal assist;Supine to/from sit;Rolling   PT: Transfers Moderate assist;Sit to/from stand;Bed to/from chair;Assistive device   PT: Gait Moderate assist;Rolling walker;25 feet

## 2022-05-03 NOTE — PLAN OF CARE
[id number]    Assessments: Alert to self. Shows improvement of mental status. No SoB. Repositioned as patient tolerates. Up with lift. Denies pain. Rectal tube in place. HS .     Treatment Plan: Ray, PT/OT, Discharge TBD.    Thee Hui RN

## 2022-05-04 NOTE — PROGRESS NOTES
Care Management Follow Up    Length of Stay (days): 8    Expected Discharge Date: 05/06/2022     Concerns to be Addressed: discharge planning     Patient plan of care discussed at interdisciplinary rounds: Yes    Anticipated Discharge Disposition: Transitional Care     Anticipated Discharge Services: None  Anticipated Discharge DME: None    Patient/family educated on Medicare website which has current facility and service quality ratings: no  Education Provided on the Discharge Plan:    Patient/Family in Agreement with the Plan: yes    Referrals Placed by CM/SW:  yes  Private pay costs discussed: Not applicable    Additional Information:  Spoke with MD, Hopefully pt will be able to d.c Friday to TCU..  Referral sent for TCU in the [facility] area     Will update family if more location will be needed        Corinne C. White, LSW

## 2022-05-04 NOTE — PROGRESS NOTES
[facility]  Hospitalist Progress Note  Olu Balderas MD  05/04/2022    Assessment & Plan   Nalini Sepulveda is a 86 year old lady with history of dementia, type 2 diabetes mellitus, hypertension, hypercholesteremia, sleep apnea, breast cancer s/p mastectomy came into [facility] 4/26/2022 with fall and altered mentation.  She came from home but her family was looking for a memory care unit.  Patient was diagnosed with rhabdomyolysis in the setting of mechanical fall with fluids at the CPK is decreasing from a peak of 2233.  There was some haziness seen in the chest x-ray was thought to be pneumonia with leukocytosis was started on azithromycin and ceftriaxone which were changed to Zosyn and vancomycin with which WBC count has been decreasing from a peak of 34.4.  Magnesium was low and has been replaced.      Patient's daughter has medical power of  and makes decisions on patient's behalf and the daughter stated that the patient is DNR/DNI.  She wanted care not to be escalated if patient's condition deteriorates.  Due to patient's poor participation swallowing evaluation was unable to be completed they have recommended soft and bite-size diet with thin liquids with the use of swallowing questions with assistance/supervision.       She remains admitted for correction of her sodium.  Likely TCU placement in the next day or 2.       Date of Admission:  4/26/2022       Community-acquired pneumonia versus aspiration pneumonia  Metabolic encephalopathy with acute hypoactive delirium on dementia  WBC has been improving on treatment with Zosyn and vancomycin  Vancomycin was discontinued   WBC coming down  Confusion is better     Left foot ulcers  - woc consult appreciated  - VALERIE to check for PAD    Sinus tachycardia: Resolved.  Suspect this was related to beta-blocker withdrawal.    Rhabdomyolysis: CPK has been decreasing with IV fluids.  Resolved.     Hyponatremia: Was  "getting hypotonic fluids, this was iatrogenic to at least some extent.  Restart normal saline and recheck.  Hypomagnesemia  On magnesium replacement protocol -better     Type 2 diabetes mellitus  Metformin that the patient takes at home is on hold with treatment with sliding scale insulin     Hypertension  continue metoprolol, lisinopril       Diarrhea   Negative for C diff on treatment with antibiotics   given Immodium and improved  Rectal tube out.    Hypercholesteremia  -continue atorvastatin at home     Dementia  Family working on getting patients into a memory care unit     DNR/DNI     Plan:   PT and OT  VALERIE     Diet: Combination Diet Full Liquid Diet; Mildly Thick (level 2) (by spoon, slow rate, 1/2 tsp amnts)    DVT Prophylaxis: Enoxaparin (Lovenox) SQ  Tenorio Catheter: Not present  Code Status: No CPR- Do NOT Intubate           Interval History   I assumed care today  Called her daughter, Anusha for an update x 2 but couldn't reach her.  Adding NS as sodium has drifted lower.    -Data reviewed today: I reviewed all new labs and imaging over the last 24 hours. I personally reviewed no images or EKG's today.    Physical Exam    , Blood pressure 134/56, pulse 78, temperature 97.5  F (36.4  C), temperature source Oral, resp. rate 16, height 1.499 m (4' 11\"), weight 49 kg (108 lb), SpO2 96 %.  Vitals:    05/02/22 0522 05/03/22 0617 05/04/22 0630   Weight: 49.5 kg (109 lb 3.2 oz) 49.6 kg (109 lb 6.4 oz) 49 kg (108 lb)     Vital Signs with Ranges  Temp:  [97.1  F (36.2  C)-98.6  F (37  C)] 97.5  F (36.4  C)  Pulse:  [78-92] 78  Resp:  [16-20] 16  BP: (134-176)/(49-77) 134/56  SpO2:  [93 %-100 %] 96 %  I/O's Last 24 hours  No intake/output data recorded.    Constitutional: arouses no apparent distress, lips bruised  Respiratory: Clear to auscultation bilaterally, no crackles or wheezing  Cardiovascular: Regular rate and rhythm, normal S1 and S2, and no murmur noted  GI: Normal bowel sounds, soft, non-distended, " non-tender  Skin/Integumen: No rashes, no cyanosis, no edema  Other:  left lateral foot wounds    Medications   All medications were reviewed.    sodium chloride 75 mL/hr at 05/04/22 0931       acetaminophen  1,000 mg Oral BID     atorvastatin  20 mg Oral Daily     enoxaparin ANTICOAGULANT  30 mg Subcutaneous Q24H     insulin aspart  1-7 Units Subcutaneous TID AC     insulin aspart  1-5 Units Subcutaneous At Bedtime     lisinopril  40 mg Oral Daily     metoprolol succinate ER  100 mg Oral Daily     piperacillin-tazobactam  2.25 g Intravenous Q6H     sodium chloride (PF)  3 mL Intracatheter Q8H     Vitamin D3  2,000 Units Oral Daily        Data   Recent Labs   Lab 05/04/22  1245 05/04/22  0803 05/04/22  0635 05/04/22  0230 05/03/22  2339 05/03/22  0802 05/03/22  0636 05/02/22  0913 05/02/22  0749 05/01/22  0822 05/01/22  0547 04/30/22  0818 04/30/22  0719   WBC  --   --  12.5*  --   --   --   --   --  13.6*  --   --   --  19.8*   HGB  --   --  9.8*  --   --   --   --   --  9.2*  --   --   --  9.5*   MCV  --   --  100  --   --   --   --   --  110*  --   --   --  105*   PLT  --   --  391  391  --   --   --   --   --  302  --  235  --  217   NA  --   --  124*  --   --   --  128*  --  133  --  141  --  146*   POTASSIUM  --   --  3.7  --  4.4  --  3.4  --  3.8   < > 3.1*  --  3.6   CHLORIDE  --   --  95  --   --   --  98  --  107  --  114*  --  120*   CO2  --   --  22  --   --   --  21  --  21  --  24  --  21   BUN  --   --  7  --   --   --  6*  --  7  --  5*  --  6*   CR  --   --  0.90  --   --   --  0.96  --  1.11*  --  1.04  --  1.06*   ANIONGAP  --   --  7  --   --   --  9  --  5  --  3  --  5   GWENDOLYN  --   --  8.3*  --   --   --  8.0*  --  8.1*  --  8.0*  --  7.8*   * 118* 113*   < >  --    < > 136*   < > 141*   < > 150*   < > 164*    < > = values in this interval not displayed.       Recent Results (from the past 24 hour(s))   US VALERIE Doppler No Exercise    Narrative    EXAM: RESTING ANKLE-BRACHIAL INDICES  (ABIs)  LOCATION: [facility]  DATE/TIME: 5/3/2022 6:29 PM    INDICATION: Left foot wounds.  COMPARISON: None.    VALERIE FINDINGS:  RIGHT  Brachial: 175  Ankle (PT): 196 Index: 1.12  Ankle (DP): 188 Index: 1.07    LEFT  Brachial: 175, the right brachial value used as unable to obtain pressure in the left upper extremity due to limb alert band.  Ankle (PT): 214 Index: 1.22  Ankle (DP): 197 Index: 1.13    The right VALERIE at rest is 1.12. The left VALERIE at rest is 1.22.     WAVEFORMS: The dorsalis pedis and posterior tibial arteries are triphasic in both distal posterior tibial and dorsalis pedis arteries.      Impression    IMPRESSION:  1.  RIGHT LOWER EXTREMITY: VALERIE at rest is normal.  2.  LEFT LOWER EXTREMITY: VALERIE at rest is normal.       Olu Balderas MD

## 2022-05-04 NOTE — PLAN OF CARE
Pertinent assessments: alert to self only. VSS. RA. Incontinent. Loose stools improving. Appears comfortable. Pureed, thin liquid diet, feeder. Up with 2 assist with malick steady. Repositioned in bed. Stayed in bed this shift.  Multiple wounds, wound care done by WOC.  Dressings CDI.     Major Shift Events: Uneventful     Treatment Plan: Zosyn, PT and OT, Discharge TBD.     Bedside Nurse: Carlene Ng RN

## 2022-05-04 NOTE — PLAN OF CARE
End of Shift Summary  For vital signs and complete assessments, please see documentation flowsheets.     Pertinent assessments: alert to self only. VSS. Diarrhea, rectal tube fell out on day shift, not replaced. Incontinent, purewick in place.  LIANA pain, no nonverbal indicators of pain. Pureed, thin liquid diet, feeder. Up with 2 assist with malick steady. Repositioned in bed. Up to chair for dinner. Multiple wound, wound care done by WOC.      Major Shift Events: Lower ext US done. K+ 3.4, Mg 1.3 replacements in process.     Treatment Plan: Ray, PT and OT, Discharge TBD.

## 2022-05-04 NOTE — CONSULTS
[facility]  Palliative Care Consultation   Text Page    Assessment & Plan   Nalini Sepulveda is a 86 year old female who was admitted on 4/26/2022.   Consulted by Olu Balderas MD  to assist with symptom management, goals of care, and development of plan of care.    Recommendations:  1. Goals of Care- No CPR- Do NOT Intubate  Hospitalization goals discussed  Confirmed advance life support as above with daughter Anusha Fountain,     Does not want cares escalated to ICU.    Trial of BIBAP if indicated    No tube feedings    Decisional Capacity- Unreliable. Patient has an advance directive dated 08/14/2020.  Consents and decision making should be done by Anusha Fountain , daughter, the primary Health Care Agent.  Alternates is her son Harley Sepulveda .   POLST  Not in EMR, reasonable to complete  prior to discharge. Goals are restorative with selective treatment.    2. Metabolic encephalopathy 2/2 PNA with sepsis in the setting of early dementia  - treatment of medical illness  -cluster cares  -low stimulus environment   - OT     3. Pain no pain or pain history   Patient's opioid use in past 24 hours: 0 = 0mg Daily Morphine Equivalent  [place] Board of Pharmacy Data Base Reviewed:    YES; As expected, no concern for misuse/abuse of controlled medications based on this report.  ORT NA  ( add dot phrase .FRHORT if warranted)    4. Dyspnea in the setting of PNA   No dyspnea, continue to monitor     5. Generalized weakness due to medical illness   - Physical Therapy    -Up with assistance   -supplements and fed patient.  SPL diet   -management of diarrhea ( c- diff negative)     6. Spiritual Care  Oriented to Spiritual Health as part of Palliative Care team.  Spiritual Background:  Islam     6. Care Planning  Appreciate care and input from Dulce Maria Atkins. LSW in discharge planning, recommendations from therapies for TCU or if no improvement LTC  Medications for discharge: none from  palliative     Medical Decision Making and Goals of Care:  Discussed on May 4, 2022 with KAUR Sood CNP RN,-PGMT-BC  APRN,  CNP, KATARINA:  I contacted the daughter Anusha Fountain by phone.  I introduced myself and the nature of my visit.  We established that it was a good time for our discussion.  I explained my role as part of the health care team with palliative care (symptom management, goals of care decision making) and why the doctor ordered the consultation.  I stressed my major role is to help guide discussion in the above areas and to be with them through this present journey.    I updated the daughter on how her mom is doing.  She was pleased and encouraged by what was said.  Sanaz continues to not want to escalate care and focus only on efforts that can be done on the general nursing unit.  She has notice a decline in her mom's function, in part due to COVID and the isolation.  However, since her hospitalization, she has worsened and is more weak and still confused. She endorses discharge plans for TCU and from there discharge to [facility].    This writer answered all questions and concerns and will continue ongoing care with patient and family.     Thank you for involving us in the patient's care.     Ashlie Seymour RN, PGMT-BC, APRN, CNP, KATARINA   Pain Management and Palliative Care  [facility]  Pgr: [phone]  Off: [phone]    Time Spent on this Encounter   Total unit/floor time 70  minutes, time consisted of the following, examination of the patient, reviewing the record and completing documentation. >50% of time spent in counseling and coordination of care, Bedside Nurse  Melanie Luevano RN.  Time spend counseling with patient and family consisted of the following topics, goals of care, care planning for discharge and symptom management.    Understanding of disease process:   This has been discussed with daughter today.    History of  Present Illness   History is obtained from the patient, electronic health record nursing staff and  patient's daughter    Nalini Sepulveda is a 86 year old female with a past medical history of dementia, adenocarcinoma of the left breast (2006) with radiation , arthritis, HTN, hyperlipidemia, diabetes, left breast mastectomy 2015, sleep apnea who presents with metabolic encephalopathy following a fall. She was found down and developed rhabdomyolysis, generalized weakness and PNA on chest imaging. The patient  Had sustained several injuries to skin on left lateral food, lips and left shin.  Her WBC is trending toward normal. She had a bout of diarrhea and this was confirmed negative for c diff.  She is dysphagic likely from cognitive and generalized weakness and on an SPL diet with supps.    The patient is a poor historian, participated minimally only oriented to name. She prefers sidelying in bed in fetal position.  Staff reports total care and feeding standing to malick steady with strong assist of two hunched over standing on device.    This is in the setting of no recent hospitalizations or recent weight loss.  There is a  loss of function attributed to COVID-19 isolation and current medical illnesss        Past Medical History   I have reviewed this patient's medical history and updated it with pertinent information if needed.   Past Medical History:   Diagnosis Date     Adenocarcinoma of breast (H)     left     Allergic rhinitis      Arthritis      Benign adenomatous polyp of large intestine      Breast cancer (H) 2006    Left side     Dementia without behavioral disturbance (H) 1/11/2022     Diabetes (H)      Hx of radiation therapy 2006     Hyperlipidemia      Hypertension      Menopause      S/P mastectomy 12/5/2015     Sleep apnea 6/9/2009       Past Surgical History   I have reviewed this patient's surgical history and updated it with pertinent information if needed.  Past Surgical History:   Procedure  Laterality Date     BIOPSY BREAST Left 2006     HYSTERECTOMY  1994     IR LUMBAR EPIDURAL STEROID INJECTION  3/4/2002     LUMPECTOMY BREAST Left 2006     MASTECTOMY Left 12/2015     WA EXCIS BARTHOLIN GLAND/CYST      Description: Excision Of Bartholin's Gland Or Cyst;  Recorded: 07/29/2008;     WA INCISE VESTIBULAR NERVE,TRANSLABYR Left 12/4/2015    Procedure: LEFT LATISSIMUS DORSI FLAP BREAST RECONSTRUCTION WITH GEL IMPLANT;  Surgeon: Harley Abernathy MD;  Location: [facility];  Service:      WA MASTECTOMY, MODIFIED RADICAL Left 12/4/2015    Procedure: LEFT BREAST MASTECTOMY;  Surgeon: Jeferson Rodriguez MD;  Location: [facility] OR;  Service: General     Z TOTAL ABDOM HYSTERECTOMY      Description: Total Abdominal Hysterectomy;  Recorded: 12/30/2009;  Comments: BSO       Prior to Admission Medications   Prior to Admission Medications   Prescriptions Last Dose Informant Patient Reported? Taking?   BIOTIN ORAL 4/25/2022 at Unknown time  Yes Yes   Sig: [BIOTIN ORAL] Take 1 tablet by mouth daily.   acetaminophen (TYLENOL EXTRA STRENGTH) 500 MG tablet 4/25/2022 at Unknown time  No Yes   Sig: [ACETAMINOPHEN (TYLENOL EXTRA STRENGTH) 500 MG TABLET] Take 2 tablets (1,000 mg total) by mouth 2 (two) times a day.   atorvastatin (LIPITOR) 20 MG tablet 4/25/2022 at Unknown time  No Yes   Sig: [ATORVASTATIN (LIPITOR) 20 MG TABLET] Take 1 tablet (20 mg total) by mouth daily.   b complex vitamins tablet 4/25/2022 at Unknown time  Yes Yes   Sig: [B COMPLEX VITAMINS TABLET] Take 1 tablet by mouth daily.   cholecalciferol, vitamin D3, 1,000 unit tablet 4/25/2022 at Unknown time  Yes Yes   Sig: [CHOLECALCIFEROL, VITAMIN D3, 1,000 UNIT TABLET] Take 2,000 Units by mouth daily.   lisinopriL (PRINIVIL,ZESTRIL) 40 MG tablet 4/25/2022 at Unknown time  No Yes   Sig: [LISINOPRIL (PRINIVIL,ZESTRIL) 40 MG TABLET] Take 1 tablet (40 mg total) by mouth daily.   metFORMIN (GLUCOPHAGE) 500 MG tablet 4/25/2022 at Unknown time   No Yes   Sig: TAKE 2 TABLETS BY MOUTH 2 TIMES A DAY WITH MEALS.   Patient taking differently: Take 500 mg by mouth 2 times daily (with meals)   metoprolol succinate (TOPROL-XL) 100 MG 24 hr tablet 4/25/2022 at Unknown time  No Yes   Sig: [METOPROLOL SUCCINATE (TOPROL-XL) 100 MG 24 HR TABLET] Take 1 tablet (100 mg total) by mouth daily.      Facility-Administered Medications: None     Allergies   Allergies   Allergen Reactions     Codeine Hives and Itching       Social History   I have updated and reviewed the following Social History Narrative:   Social History     Social History Narrative    Daughter thinks that she was checked before with chickenpox states that she no longer drives.  She reports that [place] is [place] has not been out there for nearly 20 years.  She reports that she enjoyed living in [place] taking the train to [facility].  S he reports that she would like to exercise and go to the [facility].    Patient lives in a house with her dog [son owns the house].  Twice a day, with some delivers her pills and make sure is that she takes them.  He also make sure that the dog is being taken  care of.  Her daughter is also: Check.  There are cameras in the house,, for safety as well.    Betsy Mullen MD  6/27/2021                Living situation:  Lives alone independent in home, own by son  Family working on placement in [facility]        Support system:  Daughter and son        Actual/Potential Caregiver:  Same        Functional status:  Decline from PTA, walking with cane, feeding self assisting with meal prep         Financial concerns:  None        Substance use disorder (past / present):  none        Occupation:         Hobbies:  Has dog     Family History   I have reviewed this patient's family history and updated it with pertinent information if needed.   Family History   Problem Relation Age of Onset     Depression Mother      Diabetes Mother       Heart Disease Mother      Hypertension Mother      Cerebrovascular Disease Father      Cancer Maternal Grandmother         Thinks it was stomach cancer, but not sure.     Cancer Paternal Aunt 60.00        Thinks it was stomach cancer     Diabetes Sister      Heart Disease Sister        Review of Systems   The 10 point Review of Systems is negative other than noted in the HPI or here.    Palliative Symptom Review (0=no symptom/no concern, 1=mild, 2=moderate, 3=severe):      Pain: 0-none      Fatigue: 2-moderate      Nausea: 0-none      Constipation: 0-none      Diarrhea: 1-mild last stool 5/4,  am       Depressive Symptoms: 0-none      Anxiety: 0-none      Drowsiness: 2-moderate      Poor Appetite: 2-moderate      Shortness of Breath: 0-none      Insomnia: 0-none      Other:  Skin breakdown 1-mild      Overall (0 good/no concerns, 3 very poor):  1+    Physical Exam   Temp:  [97.1  F (36.2  C)-98.6  F (37  C)] 97.5  F (36.4  C)  Pulse:  [78-90] 78  Resp:  [16-20] 16  BP: (134-176)/(49-77) 134/56  SpO2:  [93 %-96 %] 96 %  108 lbs 0 oz  Exam:  GEN:  Sleepy, oriented x 1, appears comfortable, NAD.  HEENT:  Normocephalic/atraumatic, no scleral icterus, no nasal discharge, mouth moist. Lip abrasions dry   CV:  RRR, S1, S2; no murmurs or other irregularities noted.  +3 DP/PT pulses bilatererally; no edema BLE.  RESP:  Clear to auscultation bilaterally without rales/rhonchi/wheezing/retractions. Dependent rales noted.  Symmetric chest rise on inhalation noted.  Normal respiratory effort.  ABD:  Rounded, soft, non-tender/non-distended.  +BS  EXT:  Edema & pulses as noted above.  CMS intact x 4.     M/S:   Tender to palpation  throughout.    SKIN:  Dry to touch, no exanthems noted in the visualized areas.  mepliex on left shin left ankle   NEURO: Symmetric strength +5/5.  Sensation to touch intact all extremities.   There is no area of allodynia or hyperesthesia.  PAIN BEHAVIOR: Cooperative  Psych:  Lethargic, confused   Calm, cooperative, minimal speech     Delirium Screen/CAM:  Delirium = (#1 and #2 = YES) + (#3 and/or #4)   1) Acute onset and fluctuating course:   YES   (acute change in mental status from baseline over last 24 hours)  2) Inattention:   YES   (difficulty focusing, distractible, can't follow conversation)  3) Disorganized thinking:   YES   (score only if #1 and #2 are YES)  (rambling/irrelevant conversation, unclear/illogical thoughts, inconsistency)  4) Altered level of consciousness:   YES   (score only if #1 and #2 are YES)  (other than alert, calm, cooperative)    Delirium/CAM score: 4/4  Interpretation:  1)  Delirium:  Present  2)  Type:  hypoactive  3)  Severity:  moderate    Data   Results for orders placed or performed during the hospital encounter of 04/26/22 (from the past 24 hour(s))   Magnesium   Result Value Ref Range    Magnesium 1.3 (L) 1.6 - 2.3 mg/dL   Glucose by meter   Result Value Ref Range    GLUCOSE BY METER POCT 161 (H) 70 - 99 mg/dL   US VALERIE Doppler No Exercise    Narrative    EXAM: RESTING ANKLE-BRACHIAL INDICES (ABIs)  LOCATION: [facility]  DATE/TIME: 5/3/2022 6:29 PM    INDICATION: Left foot wounds.  COMPARISON: None.    VALERIE FINDINGS:  RIGHT  Brachial: 175  Ankle (PT): 196 Index: 1.12  Ankle (DP): 188 Index: 1.07    LEFT  Brachial: 175, the right brachial value used as unable to obtain pressure in the left upper extremity due to limb alert band.  Ankle (PT): 214 Index: 1.22  Ankle (DP): 197 Index: 1.13    The right VALERIE at rest is 1.12. The left VALERIE at rest is 1.22.     WAVEFORMS: The dorsalis pedis and posterior tibial arteries are triphasic in both distal posterior tibial and dorsalis pedis arteries.      Impression    IMPRESSION:  1.  RIGHT LOWER EXTREMITY: VALERIE at rest is normal.  2.  LEFT LOWER EXTREMITY: VALERIE at rest is normal.   Glucose by meter   Result Value Ref Range    GLUCOSE BY METER POCT 231 (H) 70 - 99 mg/dL   Potassium   Result Value Ref Range     Potassium 4.4 3.4 - 5.3 mmol/L   Glucose by meter   Result Value Ref Range    GLUCOSE BY METER POCT 132 (H) 70 - 99 mg/dL   Platelet count   Result Value Ref Range    Platelet Count 391 150 - 450 10e3/uL   Basic metabolic panel   Result Value Ref Range    Sodium 124 (L) 133 - 144 mmol/L    Potassium 3.7 3.4 - 5.3 mmol/L    Chloride 95 94 - 109 mmol/L    Carbon Dioxide (CO2) 22 20 - 32 mmol/L    Anion Gap 7 3 - 14 mmol/L    Urea Nitrogen 7 7 - 30 mg/dL    Creatinine 0.90 0.52 - 1.04 mg/dL    Calcium 8.3 (L) 8.5 - 10.1 mg/dL    Glucose 113 (H) 70 - 99 mg/dL    GFR Estimate 63 >60 mL/min/1.73m2   CBC with platelets   Result Value Ref Range    WBC Count 12.5 (H) 4.0 - 11.0 10e3/uL    RBC Count 2.90 (L) 3.80 - 5.20 10e6/uL    Hemoglobin 9.8 (L) 11.7 - 15.7 g/dL    Hematocrit 28.9 (L) 35.0 - 47.0 %     78 - 100 fL    MCH 33.8 (H) 26.5 - 33.0 pg    MCHC 33.9 31.5 - 36.5 g/dL    RDW 11.0 10.0 - 15.0 %    Platelet Count 391 150 - 450 10e3/uL   CK total   Result Value Ref Range     30 - 225 U/L   Magnesium   Result Value Ref Range    Magnesium 2.1 1.6 - 2.3 mg/dL   Glucose by meter   Result Value Ref Range    GLUCOSE BY METER POCT 118 (H) 70 - 99 mg/dL   Glucose by meter   Result Value Ref Range    GLUCOSE BY METER POCT 160 (H) 70 - 99 mg/dL   Sodium   Result Value Ref Range    Sodium 127 (L) 133 - 144 mmol/L

## 2022-05-05 NOTE — PROGRESS NOTES
[facility]  Palliative Care Progress Note  Text Page     Assessment & Plan    Nalini Sepulveda is a 86 year old female who was admitted on 4/26/2022.   Consulted by Olu Balderas MD  to assist with symptom management, goals of care, and development of plan of care.  Recommendations:  1. Goals of Care- No CPR- Do NOT Intubate  Hospitalization goals discussed  Confirmed advance life support as above with daughter Anusha Fountain,     Does not want cares escalated to ICU.    Trial of BIBAP if indicated    No tube feedings    Decisional Capacity- Unreliable. Patient has an advance directive dated 08/14/2020.  Consents and decision making should be done by Anusha Fountain , daughter, the primary Health Care Agent.  Alternates is her son Harley Sepulveda .   POLST  Not in EMR, reasonable to complete  prior to discharge. Goals are restorative with selective treatment. Leaning toward hospice     2. Metabolic encephalopathy 2/2 PNA with sepsis in the setting of early dementia-- more wakeful today  - treatment of medical illness  -cluster cares  -low stimulus environment   - OT      3. Pain no pain or pain history   Patient's opioid use in past 24 hours: 0 = 0mg Daily Morphine Equivalent  [place] Board of Pharmacy Data Base Reviewed:    YES; As expected, no concern for misuse/abuse of controlled medications based on this report.  ORT NA  ( add dot phrase .FRHORT if warranted)     4. Dyspnea in the setting of PNA   No dyspnea, continue to monitor      5. Generalized weakness with functional decline related to medical illness, baseline dementia, with limitations in rehab potential    - Physical Therapy    -Up with assistance , max of 2 with malick steady  -supplements and fed patient.  SPL diet   -feed patient  -management of diarrhea ( c- diff negative)      6. Spiritual Care  Oriented to Spiritual Health as part of Palliative Care team.  Spiritual Background:  Oriental orthodox      6. Care  I can call her , but she is overdue for follow up. She has an appointment 5/23/18. This cannot wait until then? otherwise she won't get a call until the End of day. Will forward to RN - who can triage the call.    Planning  Appreciate care and input from Dulce Maria Atkins. LSW in discharge planning, consult placed for hospice with LTC  Medications for discharge: none from palliative      Medical Decision Making and Goals of Care:    Interval data and discussion on May 5, 2022 with KAUR Sood CNP RN,-PGMT-BC  APRN,  CNP, ACHPN:  I met with the daughter 12:50- 1:10 today and reviewed hospital goals of care.  Patient seemed more awake today , still only oriented to self disoriented to event, place date and fall PTA.  Is cooperative.  She denies pain, anxiety, depression.  Closing eyes on and off during interview.  Per nsg report is 100 % total care and assist.  Is 100 % feed at 0-25%. consumed. Continues with IVF for hydration and daily or more loose watery BM  She would prefer to lie in bed on her left all curled up in fetal position. States she is cold.  Daughter questions patient ability with cognition and strength to tolerated subacute rehab.   Initially daughter surprised, but agreeable to explore hospice transition.  Discussed how hospice with extra layer of support in symptom management may improve QoL. Daughter will discuss with family ( brother) but feels he would be on board.  Discussed on May 4, 2022 with KAUR Sood CNP RN,-PGMT-BC  APRN,  CNP, ACHPN:  I contacted the daughter Anusha Fountain by phone.  I introduced myself and the nature of my visit.  We established that it was a good time for our discussion.  I explained my role as part of the health care team with palliative care (symptom management, goals of care decision making) and why the doctor ordered the consultation.  I stressed my major role is to help guide discussion in the above areas and to be with them through this present journey.    I updated the daughter on how her mom is doing.  She was pleased and encouraged by what was said.  Sanaz continues to not want to escalate care and focus only on efforts that  can be done on the general nursing unit.  She has notice a decline in her mom's function, in part due to COVID and the isolation.  However, since her hospitalization, she has worsened and is more weak and still confused. She endorses discharge plans for TCU and from there discharge to MCU.    This writer answered all questions and concerns and will continue ongoing care with patient and family.        Ashlie Seymour RN, PGMT-BC, APRN CNP, ACHPN  Pain Management and Palliative Care  [facility]  Pgr: [phone]      Time Spent on this Encounter   Total unit/floor time 45 minutes, time consisted of the following, examination of the patient, reviewing the record and completing documentation. >50% of time spent in counseling and coordination of care, Bedside Nurse Twila Land RN , Hospitalist  Olu Balderas MD  and   ARISTEO Freed.  Time spend counseling with patient and family consisted of the following topics, goals of care, education about prognosis, care planning for discharge and symptom management.    Review of Systems    CONSTITUTIONAL: NEGATIVE for fever, chills, change in weight  ENT/MOUTH: NEGATIVE for ear, mouth and throat problems  RESP: NEGATIVE for significant cough or SOB  CV: NEGATIVE for chest pain, palpitations or peripheral edema    Palliative Symptom Review (0=no symptom/no concern, 1=mild, 2=moderate, 3=severe):      Pain: 0-none      Fatigue: 3-severe      Nausea: 0-none      Constipation: 0-none      Diarrhea: 1-mild      Depressive Symptoms: 0-none      Anxiety: 0-none      Drowsiness: 2-moderate      Poor Appetite: 3-severe      Shortness of Breath: 0-none      Insomnia: 0-none      Other:skin breakdown  1-mild      Overall (0 good/no concerns, 3 very poor):  2    Physical Exam   Temp:  [98.2  F (36.8  C)-98.8  F (37.1  C)] 98.8  F (37.1  C)  Pulse:  [65-89] 65  Resp:  [15-18] 15  BP: (116-166)/(46-81) 153/58  SpO2:  [94  %-97 %] 96 %  106 lbs 1.6 oz  Exam:  GEN:  Alert, oriented x 1, appears comfortable, NAD.  HEENT:  Normocephalic/atraumatic, no scleral icterus, no nasal discharge, mouth moist.  CV:  RRR, S1, S2; no murmurs or other irregularities noted.  +3 DP/PT pulses bilatererally; no edema BLE.  RESP:  Clear to auscultation bilaterally without rales/rhonchi/wheezing/retractions.  Symmetric chest rise on inhalation noted.  Normal respiratory effort.  ABD:  Rounded, soft, non-tender/non-distended.  +BS  EXT:  Edema & pulses as noted above.  CMS intact x 4.     M/S:   Tender to palpation  Throughout, STYLES X 4 with assistance.    SKIN:  Dry to touch, no exanthems noted in the visualized areas.    NEURO: Symmetric strength 4/5.  Sensation to touch intact all extremities.   There is no area of allodynia or hyperesthesia.  PAIN BEHAVIOR: Cooperative  Psych:  Normal affect, but lethargic.  Calm, cooperative, conversant appropriately.    Medications     sodium chloride 75 mL/hr at 05/05/22 0831       acetaminophen  1,000 mg Oral BID     atorvastatin  20 mg Oral Daily     enoxaparin ANTICOAGULANT  30 mg Subcutaneous Q24H     insulin aspart  1-7 Units Subcutaneous TID AC     insulin aspart  1-5 Units Subcutaneous At Bedtime     lisinopril  40 mg Oral Daily     metoprolol succinate ER  100 mg Oral Daily     sodium chloride (PF)  3 mL Intracatheter Q8H     Vitamin D3  2,000 Units Oral Daily       Data   Results for orders placed or performed during the hospital encounter of 04/26/22 (from the past 24 hour(s))   Glucose by meter   Result Value Ref Range    GLUCOSE BY METER POCT 160 (H) 70 - 99 mg/dL   Sodium   Result Value Ref Range    Sodium 127 (L) 133 - 144 mmol/L   Glucose by meter   Result Value Ref Range    GLUCOSE BY METER POCT 122 (H) 70 - 99 mg/dL   Glucose by meter   Result Value Ref Range    GLUCOSE BY METER POCT 116 (H) 70 - 99 mg/dL   Glucose by meter   Result Value Ref Range    GLUCOSE BY METER POCT 100 (H) 70 - 99 mg/dL    Basic metabolic panel   Result Value Ref Range    Sodium 129 (L) 133 - 144 mmol/L    Potassium 3.6 3.4 - 5.3 mmol/L    Chloride 102 94 - 109 mmol/L    Carbon Dioxide (CO2) 21 20 - 32 mmol/L    Anion Gap 6 3 - 14 mmol/L    Urea Nitrogen 9 7 - 30 mg/dL    Creatinine 0.89 0.52 - 1.04 mg/dL    Calcium 7.8 (L) 8.5 - 10.1 mg/dL    Glucose 97 70 - 99 mg/dL    GFR Estimate 64 >60 mL/min/1.73m2   Magnesium   Result Value Ref Range    Magnesium 1.8 1.6 - 2.3 mg/dL   Glucose by meter   Result Value Ref Range    GLUCOSE BY METER POCT 97 70 - 99 mg/dL

## 2022-05-05 NOTE — DISCHARGE INSTRUCTIONS
"Left lateral foot wound(s): Every 3 days   1. Clean wound with saline or MicroKlenz Spray, pat dry  2. Wipe / \"clean\" the surrounding periwound tissue with No Sting and allow to dry.   3. Apply nickel thickness layer of iodosorb gel to wound  4. Press a Mepilex 4x4   4. Time and date dressing change  5. Ensure that area is floating at all times and no pressure applied.    Left shin wound: Every 3 days  1. Cleanse wound with wound cleanser. Pat dry with gauze.  2. Apply oil emulsion gauze (Adaptic) to wound.  3. Secure with Mepilex  4. Time and date dressing.  "

## 2022-05-05 NOTE — PLAN OF CARE
End of Shift Summary  For vital signs and complete assessments, please see documentation flowsheets.     Pertinent assessments: alert to self only. VSS. RA. Incontinent bowel and bladder. 1 loose stool this am, Imodium given. Purewick in place. No signs of pain. Pureed, thin liquid diet, feeder, poor appetite. Up with 2 assist with malick steady. Repositioned in bed. Up to chair. Multiple wounds, dressings CDI.     Major Shift Events: Up to chair for meals.     Treatment Plan: Ray, PT and OT, Discharge TBD. Monitor Na.

## 2022-05-05 NOTE — PROGRESS NOTES
Care Management Follow Up    Length of Stay (days): 9    Expected Discharge Date: 05/06/2022     Concerns to be Addressed: discharge planning     Patient plan of care discussed at interdisciplinary rounds: Yes    Anticipated Discharge Disposition: Long Term Care  Anticipated Discharge Services: None  Anticipated Discharge DME: None    Patient/family educated on Medicare website which has current facility and service quality ratings: no  Education Provided on the Discharge Plan:  Yes Sanaz   Patient/Family in Agreement with the Plan: yes    Referrals Placed by CM/SW:  yes  Private pay costs discussed: Not applicable    Additional Information:  Spoke with daughter about the new plan to look for LTC placement rather then TCU.. Pal Care team feels if pt transfers to LTC and does not improve that a conversation for Hospice could be considered    [facility] as well as [facility]       Corinne C. White, LSW

## 2022-05-05 NOTE — PLAN OF CARE
More alert today, oriented to self only. Up to chair this morning with Ax2 gait belt and malick steady. Poor PO intake. Incontinent of bowel and bladder. Purewick in place. Continues to have diarrhea, imodium given x1. IV Zosyn discontinued. BG 97 and 101. Na 129 this morning. Wound care done. Daughter at bedside. Discharge pending.

## 2022-05-05 NOTE — PROGRESS NOTES
[facility] Nurse Inpatient Assessment     Today's Assessment: Left lateral foot, left shin, lips    Patient History (according to provider note(s):      Nalini Sepulveda is a 86 year old lady with history of dementia, type 2 diabetes mellitus, hypertension, hypercholesteremia, sleep apnea, breast cancer s/p mastectomy came into [facility] 4/26/2022 with fall and altered mentation.  She came from home but her family was looking for a memory care unit.  Patient was diagnosed with rhabdomyolysis in the setting of mechanical fall with fluids at the CPK is decreasing from a peak of 2233.  There was some haziness seen in the chest x-ray was thought to be pneumonia with leukocytosis was started on azithromycin and ceftriaxone which were changed to Zosyn and vancomycin with which WBC count has been decreasing from a peak of 34.4.  Magnesium was low and has been replaced.  Patient's daughter has medical power of  and makes decisions on patient's behalf and the daughter stated that the patient is DNR/DNI.  She wanted care not to be escalated if patient's condition deteriorates.  Due to patient's poor participation swallowing evaluation was unable to be completed they have recommended soft and bite-size diet with thin liquids with the use of swallowing questions with assistance/supervision    AREAS ASSESSED:    Areas visualized during today's visit: Focused: left lateral foot, left shin, lips    Pressure Injury Location: Left lateral foot    Last photo: 5/5/22      Wound type: Pressure Injury     Pressure Injury Stage: Unstageable, present on admission   Wound history/plan of care:   Patient was found down at home. DM2    Wound base: 100 % slough     Palpation of the wound bed: firm, expected over inocencia prominence      Drainage: scant     Description of drainage: serosanguinous     Measurements (length x width x depth, in cm) 1  x 1.5  x  0.1 cm      Tunneling N/A     Undermining  N/A  Periwound skin: Erythema- blanchable      Color: pink      Temperature: normal   Odor: none  Pain: absent and denies , none  Pain intervention prior to dressing change: patient tolerated well  Treatment goal: remove necrotic tissue, heal  STATUS: unchanged  Supplies ordered: discussed with RN and discussed with patient     Wound Location: Left shin    Last photo: 5/3/22    Wound due to: Skin Tear  Wound history/plan of care:   Patient with fragile skin and fall prior to admision  Wound base: 100 % dermis 75% reapproximation of skin flap     Palpation of the wound bed: normal      Drainage: scant     Description of drainage: serosanguinous     Measurements (length x width x depth, in cm) 0.4  x 0.8  x  0.1 cm      Tunneling N/A     Undermining N/A  Periwound skin: Ecchymosis      Color: normal and consistent with surrounding tissue      Temperature: normal   Odor: none  Pain: absent and denies , none  Pain interventions prior to dressing change: patient tolerated well  Treatment goal: Heal   STATUS: improving  Supplies ordered: supplies stored on unit, discussed with RN and discussed with patient      Wound Location: Lips    Last photo: 5/3/22    Wound due to: Trauma  Wound history/plan of care:   Patient fell prior to admission with noted bruising for face and lips on admission  Wound base: 100 % dry drainage     Palpation of the wound bed: normal      Drainage: scant     Description of drainage: dried     Measurements (length x width x depth, in cm) scattered over both lips     Tunneling N/A     Undermining N/A  Periwound skin: Intact      Color: normal and consistent with surrounding tissue      Temperature: normal   Odor: none  Pain: absent and denies , none  Pain interventions prior to dressing change: patient tolerated well  Treatment goal: Heal   STATUS: unchanged  Supplies ordered: supplies stored on unit, discussed with RN and discussed with patient       TREATMENT PLAN:     Left lateral foot wound(s):  "Every other day   1. Clean wound with saline or MicroKlenz Spray, pat dry  2. Wipe / \"clean\" the surrounding periwound tissue with No Sting and allow to dry.   3. Apply nickel thickness layer of iodosorb gel to wound  4. Press a Mepilex 4x4   4. Time and date dressing change  5. Ensure that area is floating at all times and no pressure applied.    Lip wounds: Every shift.  1. Provide good oral cares with lip moisturizer application.    Left shin wound: Q3D  1. Cleanse wound with wound cleanser. Pat dry with gauze.  2. Apply oil emulsion gauze (Adaptic) to wound.  3. Secure with Mepilex  4. Time and date dressing.    RECOMMEND PRIMARY TEAM ORDER: None, at this time  Education provided to: importance of repositioning, plan of care, Moisture management, Hygiene and Off-loading pressure  Discussed plan of care with: Patient and Nurse, sent WBP to Dr. Magallanes to discuss patient. Plan of care for foot.  WOC Nurse follow-up plan:will follow up later this week to determine final plan of care for foot  Notify WOC if wound(s) deteriorate.  Nursing to notify the Provider(s) and re-consult the WOC Nurse if new skin concern.    DATA:     Current support surface: Standard  Atmos Air mattress  Containment of urine/stool: Brief and Incontinent pad in bed  BMI: Body mass index is 21.43 kg/m .   Active Diet Order: Orders Placed This Encounter      Combination Diet Pureed Diet (level 4); Thin Liquids (level 0)     Output: I/O last 3 completed shifts:  In: 873.75 [I.V.:873.75]  Out: 600 [Urine:600]   Labs:   Recent Labs   Lab 05/04/22  0635   HGB 9.8*   WBC 12.5*     Pressure Injury Risk Assessment:   David Risk Assessment  Sensory Perception: 3-->slightly limited  Moisture: 2-->very moist  Activity: 2-->chairfast  Mobility: 2-->very limited  Nutrition: 2-->probably inadequate  Friction and Shear: 1-->problem  David Score: 12    Antoni Rob RN [facility]  Dept. Pager: [phone]  Dept. Office Number: [phone]        "

## 2022-05-05 NOTE — PROGRESS NOTES
[facility]  Hospitalist Progress Note  Olu Balderas MD  05/05/2022    Assessment & Plan   Nalini Sepulveda is a 86 year old lady with history of dementia, type 2 diabetes mellitus, hypertension, hypercholesteremia, sleep apnea, breast cancer s/p mastectomy came into [facility] 4/26/2022 with fall and altered mentation.  She came from home but her family was looking for a memory care unit.  Patient was diagnosed with rhabdomyolysis in the setting of mechanical fall with fluids at the CPK is decreasing from a peak of 2233.  There was some haziness seen in the chest x-ray was thought to be pneumonia with leukocytosis was started on azithromycin and ceftriaxone which were changed to Zosyn and vancomycin with which WBC count has been decreasing from a peak of 34.4.  Magnesium was low and has been replaced.      Patient's daughter has medical power of  and makes decisions on patient's behalf and the daughter stated that the patient is DNR/DNI.  She wanted care not to be escalated if patient's condition deteriorates.  Due to patient's poor participation swallowing evaluation was unable to be completed they have recommended soft and bite-size diet with thin liquids with the use of swallowing questions with assistance/supervision.       She remains admitted for correction of her sodium.  Likely TCU placement in the next day or 2.       Date of Admission:  4/26/2022       Community-acquired pneumonia versus aspiration pneumonia  Metabolic encephalopathy with acute hypoactive delirium on dementia  Completed 9 days of zosyn.  Stopped 5/5.  Confusion is better     Left foot ulcers  - woc consult appreciated  - VALERIE to check for PAD    Sinus tachycardia: Resolved.  Suspect this was related to beta-blocker withdrawal.    Rhabdomyolysis: CPK has been decreasing with IV fluids.  Resolved.     Hyponatremia: Was getting hypotonic fluids, this was iatrogenic to at least some  "extent.  Restart normal saline and recheck.  Hypomagnesemia  On magnesium replacement protocol -better     Type 2 diabetes mellitus  Metformin that the patient takes at home is on hold with treatment with sliding scale insulin     Hypertension  continue metoprolol, lisinopril       Diarrhea   Negative for C diff on treatment with antibiotics   given Immodium and improved  Rectal tube out.  Overall improving, likely to improve further as we stopped antibiotics 5/5.    Hypercholesteremia  -continue atorvastatin at home     Dementia  Family working on getting patients into a memory care unit     DNR/DNI     Plan:   PT and OT  VALERIE     Diet: Combination Diet Full Liquid Diet; Mildly Thick (level 2) (by spoon, slow rate, 1/2 tsp amnts)    DVT Prophylaxis: Enoxaparin (Lovenox) SQ  Tenorio Catheter: Not present  Code Status: No CPR- Do NOT Intubate    Dispo: ?TCU tomorrow if sodium is appropriate         Interval History   Sodium correcting w/ NS  called her daughter for an update.  Told her likely discharge to TCU tomorrow and she was agreeable  Stopped antibiotics.      -Data reviewed today: I reviewed all new labs and imaging over the last 24 hours. I personally reviewed no images or EKG's today.    Physical Exam    , Blood pressure 116/46, pulse 72, temperature 98.2  F (36.8  C), temperature source Axillary, resp. rate 16, height 1.499 m (4' 11\"), weight 48.1 kg (106 lb 1.6 oz), SpO2 94 %.  Vitals:    05/03/22 0617 05/04/22 0630 05/05/22 0531   Weight: 49.6 kg (109 lb 6.4 oz) 49 kg (108 lb) 48.1 kg (106 lb 1.6 oz)     Vital Signs with Ranges  Temp:  [97.5  F (36.4  C)-98.8  F (37.1  C)] 98.2  F (36.8  C)  Pulse:  [72-89] 72  Resp:  [16-18] 16  BP: (116-176)/(46-81) 116/46  SpO2:  [94 %-97 %] 94 %  I/O's Last 24 hours  I/O last 3 completed shifts:  In: 873.75 [I.V.:873.75]  Out: 600 [Urine:600]    Constitutional: arouses no apparent distress, lips bruised  Respiratory: Clear to auscultation bilaterally, no crackles or " wheezing  Cardiovascular: Regular rate and rhythm, normal S1 and S2, and no murmur noted  GI: Normal bowel sounds, soft, non-distended, non-tender  Skin/Integumen: No rashes, no cyanosis, no edema  Other:  left lateral foot wounds    Medications   All medications were reviewed.    sodium chloride 75 mL/hr at 05/05/22 0236       acetaminophen  1,000 mg Oral BID     atorvastatin  20 mg Oral Daily     enoxaparin ANTICOAGULANT  30 mg Subcutaneous Q24H     insulin aspart  1-7 Units Subcutaneous TID AC     insulin aspart  1-5 Units Subcutaneous At Bedtime     lisinopril  40 mg Oral Daily     metoprolol succinate ER  100 mg Oral Daily     piperacillin-tazobactam  2.25 g Intravenous Q6H     sodium chloride (PF)  3 mL Intracatheter Q8H     Vitamin D3  2,000 Units Oral Daily        Data   Recent Labs   Lab 05/05/22  0234 05/04/22 2128 05/04/22  1659 05/04/22  1427 05/04/22  0803 05/04/22  0635 05/04/22  0230 05/03/22  2339 05/03/22  0802 05/03/22  0636 05/02/22  0913 05/02/22  0749 05/01/22  0822 05/01/22  0547 04/30/22  0818 04/30/22  0719   WBC  --   --   --   --   --  12.5*  --   --   --   --   --  13.6*  --   --   --  19.8*   HGB  --   --   --   --   --  9.8*  --   --   --   --   --  9.2*  --   --   --  9.5*   MCV  --   --   --   --   --  100  --   --   --   --   --  110*  --   --   --  105*   PLT  --   --   --   --   --  391  391  --   --   --   --   --  302  --  235  --  217   NA  --   --   --  127*  --  124*  --   --   --  128*  --  133  --  141  --  146*   POTASSIUM  --   --   --   --   --  3.7  --  4.4  --  3.4  --  3.8   < > 3.1*  --  3.6   CHLORIDE  --   --   --   --   --  95  --   --   --  98  --  107  --  114*  --  120*   CO2  --   --   --   --   --  22  --   --   --  21  --  21  --  24  --  21   BUN  --   --   --   --   --  7  --   --   --  6*  --  7  --  5*  --  6*   CR  --   --   --   --   --  0.90  --   --   --  0.96  --  1.11*  --  1.04  --  1.06*   ANIONGAP  --   --   --   --   --  7  --   --   --  9  --   5  --  3  --  5   GWENDOLYN  --   --   --   --   --  8.3*  --   --   --  8.0*  --  8.1*  --  8.0*  --  7.8*   * 116* 122*  --    < > 113*   < >  --    < > 136*   < > 141*   < > 150*   < > 164*    < > = values in this interval not displayed.       No results found for this or any previous visit (from the past 24 hour(s)).    Olu Balderas MD

## 2022-05-05 NOTE — PROGRESS NOTES
End of Shift Summary  For vital signs and complete assessments, please see documentation flowsheets.     Pertinent assessments: Pt alert to self only, no signs of pain. On RA. LS diminished. Incont of B&B, pure wick in place. BG check 100    Major Shift Events: uneventful     Treatment Plan: IVF, IV Zosyn, K+/Mg protocol, monitor Na     Bedside Nurse: Gabby Juan RN

## 2022-05-06 NOTE — PROGRESS NOTES
[facility]  Hospitalist Progress Note  Olu Balderas MD  05/06/2022    Assessment & Plan   Nalini Sepulveda is a 86 year old lady with history of dementia, type 2 diabetes mellitus, hypertension, hypercholesteremia, sleep apnea, breast cancer s/p mastectomy came into [facility] 4/26/2022 with fall and altered mentation.  She came from home but her family was looking for a memory care unit.  Patient was diagnosed with rhabdomyolysis in the setting of mechanical fall with fluids at the CPK is decreasing from a peak of 2233.  There was some haziness seen in the chest x-ray was thought to be pneumonia with leukocytosis was started on azithromycin and ceftriaxone which were changed to Zosyn and vancomycin with which WBC count has been decreasing from a peak of 34.4.  Magnesium was low and has been replaced.      Patient's daughter has medical power of  and makes decisions on patient's behalf and the daughter stated that the patient is DNR/DNI.  She wanted care not to be escalated if patient's condition deteriorates.  Due to patient's poor participation swallowing evaluation was unable to be completed they have recommended soft and bite-size diet with thin liquids with the use of swallowing questions with assistance/supervision.       She remained admitted for correction of her sodium.  Likely long-term care placement in the next day or so.  Getting hospice evaluation as well.       Date of Admission:  4/26/2022       Community-acquired pneumonia versus aspiration pneumonia  Metabolic encephalopathy with acute hypoactive delirium on dementia  Completed 9 days of zosyn.  Stopped 5/5.  Confusion is better     Left foot ulcers  - woc consult appreciated  - VALERIE to check for PAD    Sinus tachycardia: Resolved.  Suspect this was related to beta-blocker withdrawal.    Rhabdomyolysis: CPK has been decreasing with IV fluids.  Resolved.     Hyponatremia: Was getting hypotonic  "fluids, this was iatrogenic to at least some extent.  Restart normal saline and recheck.  Hypomagnesemia  On magnesium replacement protocol -better     Type 2 diabetes mellitus  Metformin that the patient takes at home is on hold with treatment with sliding scale insulin     Hypertension  continue metoprolol, lisinopril       Diarrhea   Negative for C diff on treatment with antibiotics   given Immodium and improved  Rectal tube out.  Overall improving, likely to improve further as we stopped antibiotics 5/5.    Hypercholesteremia  -continue atorvastatin at home     Dementia  Family working on getting patients into a memory care unit     DNR/DNI     Diet: Combination Diet Full Liquid Diet; Mildly Thick (level 2) (by spoon, slow rate, 1/2 tsp amnts)    DVT Prophylaxis: Enoxaparin (Lovenox) SQ  Tenorio Catheter: Not present  Code Status: No CPR- Do NOT Intubate    Dispo: TCU/long-term care whenever appropriate placement is found.         Interval History   Sodium correcting w/ NS  Met with her son at the bedside for full update  Patient is sleepy today when I was there but earlier was up talking with her son.  Smiles at me and denies complaints.      -Data reviewed today: I reviewed all new labs and imaging over the last 24 hours. I personally reviewed no images or EKG's today.    Physical Exam    , Blood pressure (!) 168/70, pulse 99, temperature 98.2  F (36.8  C), temperature source Axillary, resp. rate 18, height 1.499 m (4' 11\"), weight 46.9 kg (103 lb 6.4 oz), SpO2 98 %.  Vitals:    05/04/22 0630 05/05/22 0531 05/06/22 0601   Weight: 49 kg (108 lb) 48.1 kg (106 lb 1.6 oz) 46.9 kg (103 lb 6.4 oz)     Vital Signs with Ranges  Temp:  [98.2  F (36.8  C)-98.7  F (37.1  C)] 98.2  F (36.8  C)  Pulse:  [71-99] 99  Resp:  [15-18] 18  BP: (154-183)/(60-87) 168/70  SpO2:  [98 %] 98 %  I/O's Last 24 hours  I/O last 3 completed shifts:  In: 360 [P.O.:360]  Out: 1650 [Urine:1650]    Constitutional: arouses no apparent distress, " lips bruised  Respiratory: Clear to auscultation bilaterally, no crackles or wheezing  Cardiovascular: Regular rate and rhythm, normal S1 and S2, and no murmur noted  GI: Normal bowel sounds, soft, non-distended, non-tender  Skin/Integumen: No rashes, no cyanosis, no edema  Other:  left lateral foot wounds    Medications   All medications were reviewed.    sodium chloride 75 mL/hr at 05/05/22 1723       acetaminophen  1,000 mg Oral BID     atorvastatin  20 mg Oral Daily     enoxaparin ANTICOAGULANT  30 mg Subcutaneous Q24H     insulin aspart  1-7 Units Subcutaneous TID AC     insulin aspart  1-5 Units Subcutaneous At Bedtime     lisinopril  40 mg Oral Daily     metoprolol succinate ER  100 mg Oral Daily     sodium chloride (PF)  3 mL Intracatheter Q8H     Vitamin D3  2,000 Units Oral Daily        Data   Recent Labs   Lab 05/06/22  0829 05/06/22  0817 05/06/22  0250 05/05/22  0911 05/05/22  0726 05/04/22  1659 05/04/22  1427 05/04/22  0803 05/04/22  0635 05/02/22  0913 05/02/22  0749 05/01/22  0822 05/01/22  0547 04/30/22  0818 04/30/22  0719   WBC  --   --   --   --   --   --   --   --  12.5*  --  13.6*  --   --   --  19.8*   HGB  --   --   --   --   --   --   --   --  9.8*  --  9.2*  --   --   --  9.5*   MCV  --   --   --   --   --   --   --   --  100  --  110*  --   --   --  105*   PLT  --   --   --   --   --   --   --   --  391  391  --  302  --  235  --  217   NA  --  136  --   --  129*  --  127*  --  124*   < > 133  --  141  --  146*   POTASSIUM  --  3.5  --   --  3.6  --   --   --  3.7   < > 3.8   < > 3.1*  --  3.6   CHLORIDE  --  106  --   --  102  --   --   --  95   < > 107  --  114*  --  120*   CO2  --  22  --   --  21  --   --   --  22   < > 21  --  24  --  21   BUN  --  7  --   --  9  --   --   --  7   < > 7  --  5*  --  6*   CR  --  0.75  --   --  0.89  --   --   --  0.90   < > 1.11*  --  1.04  --  1.06*   ANIONGAP  --  8  --   --  6  --   --   --  7   < > 5  --  3  --  5   GWENDOLYN  --  8.4*  --   --  7.8*   --   --   --  8.3*   < > 8.1*  --  8.0*  --  7.8*   * 111* 110*   < > 97   < >  --    < > 113*   < > 141*   < > 150*   < > 164*    < > = values in this interval not displayed.       No results found for this or any previous visit (from the past 24 hour(s)).    Olu Balderas MD

## 2022-05-06 NOTE — PROGRESS NOTES
Reviewed pt chart for hospice eligibility and reviewed with Dr Anna. 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 Updated Dulce Maria FLOWERS. Hospice will continue to be available as needed.     Thank you for the opportunity to assist you with this pt.     Odilia Iraheta RN BSN PHN [facility]  Hospice Referral Specialist  [facility]    [phone]  [email or web address]

## 2022-05-06 NOTE — PLAN OF CARE
Goal Outcome Evaluation:    Plan of Care Reviewed With: patient     Overall Patient Progress: no change     End of Shift Summary  For vital signs and complete assessments, please see documentation flowsheets.     Pertinent assessments: A&O to self only. BP elevated, otherwise VSS. Denies pain. Incontinent of B&B. Purewick in place. Wounds covered. Lips are scabbed and cracked- oral cares provided. Dressing to the left shin replaced today.    Major Shift Events: None    Treatment Plan: Monitor electrolytes, monitor stools, encourage intake.     Bedside Nurse: Jolly Colby RN

## 2022-05-06 NOTE — PROGRESS NOTES
[facility]  Palliative Care Progress Note  Text Page     Assessment & Plan    Nalini Sepulveda is a 86 year old female who was admitted on 4/26/2022.   Consulted by Olu Balderas MD  to assist with symptom management, goals of care, and development of plan of care.  Recommendations:  1. Goals of Care- No CPR- Do NOT Intubate  Hospitalization goals discussed  Confirmed advance life support as above with daughter Anusha Fountain,     Does not want cares escalated to ICU.    No tube feedings    Decisional Capacity- Unreliable. Patient has an advance directive dated 08/14/2020.  Consents and decision making should be done by Anusha Fountain , daughter, the primary Health Care Agent.  Alternates is her son Harley Sepulveda .   POLST  optimize comfort, completed today.   Daughter does not want to start full comfort plan today.     2. Metabolic encephalopathy 2/2 PNA with sepsis in the setting of early dementia-- more wakeful today  - treatment of medical illness  -cluster cares  -low stimulus environment   - OT      3. Pain no pain or pain history   Patient's opioid use in past 24 hours: 0 = 0mg Daily Morphine Equivalent  [place] Board of Pharmacy Data Base Reviewed:    YES; As expected, no concern for misuse/abuse of controlled medications based on this report.  ORT NA  ( add dot phrase .FRHORT if warranted)     4. Dyspnea in the setting of PNA   No dyspnea, continue to monitor      5. Generalized weakness with functional decline related to medical illness, baseline dementia, with limitations in rehab potential    - Physical Therapy    -Up with assistance , max of 2 with malick steady  -supplements and fed patient.  SPL diet   -feed patient  -management of diarrhea ( c- diff negative)      6. Spiritual Care  Oriented to Spiritual Health as part of Palliative Care team.  Spiritual Background:  Lutheran      6. Care Planning  Appreciate care and input from Dulce Maria Atkins. LSW in discharge  planning,  hospice with LTC  Medications for discharge: none from palliative      Medical Decision Making and Goals of Care:    Interval data and discussion on May 6, 2022 KAUR Sood CNP RN,-PGMT-BC  APRN,  CNP, ACHPN:  13:20 met with daughter. Meeting hospice criteria and family still in support of LTC with hospice.  POLST completed today.  Patient  awake, but drifts off easily, intermittently restless, still trying to get out of bed. Easily redirected.  Eating 0- 50 % at meal,full assist with all cares and feeding.   Daughter wanting to  Start comfort plan in today,but  Ok to stop unnecessary orders like daily weights, pulse ox checks, vital signs to bid. She is aware IVF will stop at 2000 today.      Interval data and discussion on May 5, 2022 with KAUR Sood CNP RN,-PGMT-BC  APRN,  CNP, ACHPN:  I met with the daughter 12:50- 1:10 today and reviewed hospital goals of care.  Patient seemed more awake today , still only oriented to self disoriented to event, place date and fall PTA.  Is cooperative.  She denies pain, anxiety, depression.  Closing eyes on and off during interview.  Per nsg report is 100 % total care and assist.  Is 100 % feed at 0-25%. consumed. Continues with IVF for hydration and daily or more loose watery BM  She would prefer to lie in bed on her left all curled up in fetal position. States she is cold.  Daughter questions patient ability with cognition and strength to tolerated subacute rehab.   Initially daughter surprised, but agreeable to explore hospice transition.  Discussed how hospice with extra layer of support in symptom management may improve QoL. Daughter will discuss with family ( brother) but feels he would be on board.  Discussed on May 4, 2022 with KAUR Sood CNP RN,-PGMT-BC  APRN,  CNP, ACHPN:  I contacted the daughter Anusha Fountain by phone.  I introduced myself and the nature of my visit.  We  established that it was a good time for our discussion.  I explained my role as part of the health care team with palliative care (symptom management, goals of care decision making) and why the doctor ordered the consultation.  I stressed my major role is to help guide discussion in the above areas and to be with them through this present journey.    I updated the daughter on how her mom is doing.  She was pleased and encouraged by what was said.  Sanaz continues to not want to escalate care and focus only on efforts that can be done on the general nursing unit.  She has notice a decline in her mom's function, in part due to COVID and the isolation.  However, since her hospitalization, she has worsened and is more weak and still confused. She endorses discharge plans for TCU and from there discharge to [facility].    This writer answered all questions and concerns and will continue ongoing care with patient and family.        Ashlie Seymour RN, PGMT-BC, APRN CNP, ACHPN  Pain Management and Palliative Care  [facility]  Pgr: [phone]      Time Spent on this Encounter   Total unit/floor time 45 minutes, time consisted of the following, examination of the patient, reviewing the record and completing documentation. >50% of time spent in counseling and coordination of care, Bedside Nurse Jolly Colby RN, Hospitalist  Olu Balderas MD  and   ARISTEO Freed.  Time spend counseling with patient and family consisted of the following topics, goals of care, education about prognosis, care planning for discharge and symptom management.    Review of Systems    CONSTITUTIONAL: NEGATIVE for fever, chills, change in weight  ENT/MOUTH: NEGATIVE for ear, mouth and throat problems  RESP: NEGATIVE for significant cough or SOB  CV: NEGATIVE for chest pain, palpitations or peripheral edema    Palliative Symptom Review (0=no symptom/no concern, 1=mild, 2=moderate, 3=severe):       Pain: 0-none      Fatigue: 3-severe      Nausea: 0-none      Constipation: 0-none      Diarrhea:0 none      Depressive Symptoms: 0-none      Anxiety: 0-none      Drowsiness: 2-moderate      Poor Appetite: 2- moderate      Shortness of Breath: 0-none      Insomnia: 0-none      Other:skin breakdown  1-mild inner gluteal redness       Overall (0 good/no concerns, 3 very poor):  2    Physical Exam   Temp:  [98.2  F (36.8  C)-98.7  F (37.1  C)] 98.2  F (36.8  C)  Pulse:  [71-99] 99  Resp:  [15-18] 18  BP: (154-183)/(60-87) 168/70  SpO2:  [98 %] 98 %  103 lbs 6.4 oz  Exam:  GEN:  Alert, oriented x 1, appears comfortable, NAD.  HEENT:  Normocephalic/atraumatic, no scleral icterus, no nasal discharge, mouth moist.  CV:  RRR, S1, S2; no murmurs or other irregularities noted.  +3 DP/PT pulses bilatererally; no edema BLE.  RESP:  Clear to auscultation bilaterally without rales/rhonchi/wheezing/retractions.  Symmetric chest rise on inhalation noted.  Normal respiratory effort.  ABD:  Rounded, soft, non-tender/non-distended.  +BS  EXT:  Edema & pulses as noted above.  CMS intact x 4.     M/S:   Tender to palpation  Throughout, STYLES X 4 with assistance.    SKIN:  Dry to touch, no exanthems noted in the visualized areas.    NEURO: Symmetric strength 4/5.  Sensation to touch intact all extremities.   There is no area of allodynia or hyperesthesia.  PAIN BEHAVIOR: Cooperative  Psych:  Normal affect, but lethargic.  Calm, cooperative, conversant appropriately.    Medications     sodium chloride 75 mL/hr at 05/05/22 1723       acetaminophen  1,000 mg Oral BID     atorvastatin  20 mg Oral Daily     enoxaparin ANTICOAGULANT  30 mg Subcutaneous Q24H     insulin aspart  1-7 Units Subcutaneous TID AC     insulin aspart  1-5 Units Subcutaneous At Bedtime     lisinopril  40 mg Oral Daily     metoprolol succinate ER  100 mg Oral Daily     sodium chloride (PF)  3 mL Intracatheter Q8H     Vitamin D3  2,000 Units Oral Daily       Data    Results for orders placed or performed during the hospital encounter of 04/26/22 (from the past 24 hour(s))   Glucose by meter   Result Value Ref Range    GLUCOSE BY METER POCT 102 (H) 70 - 99 mg/dL   Glucose by meter   Result Value Ref Range    GLUCOSE BY METER POCT 160 (H) 70 - 99 mg/dL   Glucose by meter   Result Value Ref Range    GLUCOSE BY METER POCT 110 (H) 70 - 99 mg/dL   Basic metabolic panel   Result Value Ref Range    Sodium 136 133 - 144 mmol/L    Potassium 3.5 3.4 - 5.3 mmol/L    Chloride 106 94 - 109 mmol/L    Carbon Dioxide (CO2) 22 20 - 32 mmol/L    Anion Gap 8 3 - 14 mmol/L    Urea Nitrogen 7 7 - 30 mg/dL    Creatinine 0.75 0.52 - 1.04 mg/dL    Calcium 8.4 (L) 8.5 - 10.1 mg/dL    Glucose 111 (H) 70 - 99 mg/dL    GFR Estimate 78 >60 mL/min/1.73m2   Magnesium   Result Value Ref Range    Magnesium 1.9 1.6 - 2.3 mg/dL   Glucose by meter   Result Value Ref Range    GLUCOSE BY METER POCT 103 (H) 70 - 99 mg/dL   Asymptomatic COVID-19 Virus (Coronavirus) by PCR Nasopharyngeal    Specimen: Nasopharyngeal; Swab   Result Value Ref Range    SARS CoV2 PCR Negative Negative    Narrative    Testing was performed using the SynGenert Xpress SARS-CoV-2 Assay on the   Cepheid Gene-Xpert Instrument Systems. Additional information about   this Emergency Use Authorization (EUA) assay can be found via the Lab   Guide. This test should be ordered for the detection of SARS-CoV-2 in   individuals who meet SARS-CoV-2 clinical and/or epidemiological   criteria. Test performance is unknown in asymptomatic patients. This   test is for in vitro diagnostic use under the FDA EUA for   laboratories certified under CLIA to perform high complexity testing.   This test has not been FDA cleared or approved. A negative result   does not rule out the presence of PCR inhibitors in the specimen or   target RNA in concentration below the limit of detection for the   assay. The possibility of a false negative should be considered if   the  patient's recent exposure or clinical presentation suggests   COVID-19. This test was validated by the [facility]. This laboratory is certified under the Clinical Laboratory Improvement Amendments of 1988 (CLIA-88) as qualified to perform high complexity laboratory testing.

## 2022-05-06 NOTE — PLAN OF CARE
Pertinent assessments: A&O to self only. BP elevated, otherwise VSS. Denies pain. Incontinent of B&B, had 1 loose BM. Purewick in place. Wounds covered.    Major Shift Events: None    Treatment Plan: Monitor electrolytes, monitor stools, encourage intake.     Bedside Nurse: Carlene Ng RN

## 2022-05-07 NOTE — PLAN OF CARE
Goal Outcome Evaluation:    Plan of Care Reviewed With: patient, daughter     Overall Patient Progress: declining     End of Shift Summary  For vital signs and complete assessments, please see documentation flowsheets.     Pertinent assessments: Pt on comfort cares, verbalizes minimally, drinking sips of water, updated daughter Sanaz, Son was present at the bedside this AM for MD rounds. Daughters at bedside this afternoon.     Major Shift Events uneventful    Treatment Plan: Continue comfort cares, assist to feed as safe.     Bedside Nurse: Jolly Colby RN

## 2022-05-07 NOTE — PLAN OF CARE
Speech Language Therapy Discharge Summary    Reason for therapy discharge:    Change in medical status, transitioned to comfort care    Progress towards therapy goal(s). See goals on Care Plan in [facility] electronic health record for goal details.  Goals partially met.  Barriers to achieving goals:   change to comfort care.    Therapy recommendation(s):    No further therapy is recommended.    Note: pt not seen for therapy this date.

## 2022-05-07 NOTE — PLAN OF CARE
Physical Therapy Discharge Summary    Reason for therapy discharge:    Change in medical status.    Progress towards therapy goal(s). See goals on Care Plan in [facility] electronic health record for goal details.  Pt has transitioned to comfort cares    Therapy recommendation(s):    No further therapy is recommended.

## 2022-05-07 NOTE — PROGRESS NOTES
[facility]  Hospitalist Progress Note  Olu Balderas MD  05/07/2022    Assessment & Plan   Nalini Sepulveda is a 86 year old lady with history of dementia, type 2 diabetes mellitus, hypertension, hypercholesteremia, sleep apnea, breast cancer s/p mastectomy came into [facility] 4/26/2022 with fall and altered mentation.  She came from home but her family was looking for a memory care unit.  Patient was diagnosed with rhabdomyolysis in the setting of mechanical fall with fluids at the CPK is decreasing from a peak of 2233.  There was some haziness seen in the chest x-ray was thought to be pneumonia with leukocytosis was started on azithromycin and ceftriaxone which were changed to Zosyn and vancomycin with which WBC count has been decreasing from a peak of 34.4.  Magnesium was low and has been replaced.      Patient's daughter has medical power of  and makes decisions on patient's behalf and the daughter stated that the patient is DNR/DNI.  She wanted care not to be escalated if patient's condition deteriorates.  Due to patient's poor participation swallowing evaluation was unable to be completed they have recommended soft and bite-size diet with thin liquids with the use of swallowing questions with assistance/supervision.       Overnight late 5/7 she developed focal neurologic deficits with facial droop.  Family was contacted and gave us direction that she should transition to comfort care status now but were okay with checking a CT head.  This showed a 12 x 17 x 16 mm acute intraparenchymal hemorrhage of the left lateral thalamus.  Providing comfort measures here in the hospital pending clinical trajectory.       Date of Admission:  4/26/2022    Community-acquired pneumonia versus aspiration pneumonia  Metabolic encephalopathy with acute hypoactive delirium on dementia  Completed 9 days of zosyn.  Stopped 5/5.  --Now comfort care status    Intracerebral  hemorrhage: Left thalamic hemorrhage, 17 mm in greatest diameter.  Developed late 5/6 with apparent new right facial droop.  --Comfort care status.     Left foot ulcers  - woc consult appreciated    Sinus tachycardia: Resolved.  Suspect this was related to beta-blocker withdrawal.    Rhabdomyolysis: CPK has been decreasing with IV fluids.  Resolved.     Hyponatremia: Was getting hypotonic fluids, this was iatrogenic to at least some extent.  Improved with normal saline.  We will stop checking given comfort care status.    Hypomagnesemia  --Improved with replacement protocol.     Type 2 diabetes mellitus  --Holding home metformin given comfort care status.     Hypertension  --Holding home oral medication     Diarrhea   Negative for C diff on treatment with antibiotics   given Immodium and improved  Rectal tube out.  Overall improving, likely t related to antibiotics which were stopped 5/5.    Hypercholesteremia  -continue atorvastatin at home     Dementia  Family had been working on getting patients into a memory care unit     DNR/DNI     Diet: eat for pleasure, would use soft/pureed foods.  Code Status: No CPR- Do NOT Intubate, comfort care status.  Dispo: unclear.  ?hospice enrollment and discharge to facility if stable.         Interval History   Overnight events noted: see above re: new intracerebral hemorrhage and change to comfort care status  Awake but looks uncomfortable today  I called Sanaz to talk over the plan and answer questions.      Constitutional: Arouses but looks uncomfortable.  Right facial droop.  Gaze mostly deviated to the left.  Respiratory: Clear to auscultation bilaterally  Cardiovascular: Regular rate and rhythm  GI: Soft, nondistended.  Skin/Integumen: No rashes, no cyanosis, no edema  Other:  left lateral foot wounds    Medications   All medications were reviewed.      acetaminophen  1,000 mg Oral BID     sodium chloride (PF)  3 mL Intracatheter Q8H        Data   Recent Labs   Lab  05/06/22 2057 05/06/22  1810 05/06/22  0829 05/06/22  0817 05/05/22  0911 05/05/22  0726 05/04/22  1659 05/04/22  1427 05/04/22  0803 05/04/22  0635 05/02/22  0913 05/02/22  0749 05/01/22  0822 05/01/22  0547   WBC  --   --   --   --   --   --   --   --   --  12.5*  --  13.6*  --   --    HGB  --   --   --   --   --   --   --   --   --  9.8*  --  9.2*  --   --    MCV  --   --   --   --   --   --   --   --   --  100  --  110*  --   --    PLT  --   --   --   --   --   --   --   --   --  391  391  --  302  --  235   NA  --   --   --  136  --  129*  --  127*  --  124*   < > 133  --  141   POTASSIUM  --   --   --  3.5  --  3.6  --   --   --  3.7   < > 3.8   < > 3.1*   CHLORIDE  --   --   --  106  --  102  --   --   --  95   < > 107  --  114*   CO2  --   --   --  22  --  21  --   --   --  22   < > 21  --  24   BUN  --   --   --  7  --  9  --   --   --  7   < > 7  --  5*   CR  --   --   --  0.75  --  0.89  --   --   --  0.90   < > 1.11*  --  1.04   ANIONGAP  --   --   --  8  --  6  --   --   --  7   < > 5  --  3   GWENDOLYN  --   --   --  8.4*  --  7.8*  --   --   --  8.3*   < > 8.1*  --  8.0*   * 106* 103* 111*   < > 97   < >  --    < > 113*   < > 141*   < > 150*    < > = values in this interval not displayed.       Recent Results (from the past 24 hour(s))   CT Head w/o Contrast   Result Value    Radiologist flags Acute hemorrhage in the left thalamus (AA)    Narrative    EXAM: CT HEAD W/O CONTRAST  LOCATION: [facility]  DATE/TIME: 5/6/2022 9:56 PM    INDICATION: Mental status change, unknown cause.  COMPARISON: 04/26/2022  TECHNIQUE: Routine CT Head without IV contrast. Multiplanar reformats. Dose reduction techniques were used.    FINDINGS:  INTRACRANIAL CONTENTS: There is an area of acute intraparenchymal hemorrhage measuring 12 x 17 x 16 mm located along the lateral aspect of the left thalamus and the posterior limb of the left internal capsule. Small amount of surrounding edema. No    appreciable mass effect. No CT evidence of acute infarct. Moderate presumed chronic small vessel ischemic changes. Moderate generalized volume loss. No hydrocephalus.     VISUALIZED ORBITS/SINUSES/MASTOIDS: No intraorbital abnormality. No paranasal sinus mucosal disease. No middle ear or mastoid effusion.    BONES/SOFT TISSUES: No acute abnormality.      Impression    IMPRESSION:  1.  There is a 12 x 17 x 16 mm acute intraparenchymal hemorrhage involving the lateral left thalamus and posterior limb of the internal capsule. Mild surrounding edema without significant mass effect.  2.  Underlying moderate chronic small vessel ischemic changes and volume loss.      [Critical Result: Acute hemorrhage in the left thalamus]    Finding was identified on 5/6/2022 10:01 PM.     Dr Duarte was contacted by me on 5/6/2022 10:07 PM and verbalized understanding of the critical result.        Olu Balderas MD

## 2022-05-07 NOTE — PLAN OF CARE
Goal Outcome Evaluation:    Pertinent assessments: [facility] cares [id number]. At around 2000 patient found lethargic, non-verbal, left sided face drooping. MD notified, who assessed at the bedside, and ordered a CT. Patient was found to have a brain hemorrhage. MD called the daughter, and they agreed on comfort cares.   Patient still non-verbal, no signs of pain or difficulty breathing. Hydralazine 10mg given for BP 190s/80s prior to becoming comfort cares. Now all vitals stopped. Patient is incontinent of urine, and had a small BM. On bedrest.   Major Shift Events .  Treatment Plan: ..  Bedside Nurse: Esperanza Perez RN

## 2022-05-07 NOTE — PROGRESS NOTES
And called by RN to evaluate the patient because patient has right facial droop and more confused today.  Patient seen and examined right away.  She is confused and unable to follow commands.  She has right facial droop and not moving her right hand.  Difficult to do complete neurologic exam because of her confusion.  Chart reviewed and family not wanting to escalate care if patient declines.  Code stroke was not activated.  Called her daughter and she asked to change her status to comfort care only but okay with CT of the head without contrast for diagnostic purpose.  Stat CT of the head without contrast was done.  I discussed with on-call radiologist and CT of the head without contrast showed bleeding in the left thalamus and internal capsule.  Patient's daughter was updated with the CT result.  Status was changed to comfort care only per patient's daughter.  Comfort care orders placed.

## 2022-05-08 NOTE — PROGRESS NOTES
[facility]  Hospitalist Progress Note  Olu Balderas MD  05/08/2022    Assessment & Plan   Nalini Sepulveda is a 86 year old lady with history of dementia, type 2 diabetes mellitus, hypertension, hypercholesteremia, sleep apnea, breast cancer s/p mastectomy came into [facility] 4/26/2022 with fall and altered mentation.  She came from home but her family was looking for a memory care unit.  Patient was diagnosed with rhabdomyolysis in the setting of mechanical fall with fluids at the CPK is decreasing from a peak of 2233.  There was some haziness seen in the chest x-ray was thought to be pneumonia with leukocytosis was started on azithromycin and ceftriaxone which were changed to Zosyn and vancomycin with which WBC count has been decreasing from a peak of 34.4.  Magnesium was low and has been replaced.      Patient's daughter has medical power of  and makes decisions on patient's behalf and the daughter stated that the patient is DNR/DNI.  She wanted care not to be escalated if patient's condition deteriorates.  Due to patient's poor participation swallowing evaluation was unable to be completed they have recommended soft and bite-size diet with thin liquids with the use of swallowing questions with assistance/supervision.       Overnight late 5/7 she developed focal neurologic deficits with facial droop.  Family was contacted and gave us direction that she should transition to comfort care status now but were okay with checking a CT head.  This showed a 12 x 17 x 16 mm acute intraparenchymal hemorrhage of the left lateral thalamus.  Seems to be stabilizing: non-verbal mostly but still saying a few words to family, but taking some oral intake.      Anticipate long term care placement with hospice once placement is found.       Date of Admission:  4/26/2022    Community-acquired pneumonia versus aspiration pneumonia  Metabolic encephalopathy with acute hypoactive  delirium on dementia  Completed 9 days of zosyn.  Stopped 5/5.  --Now comfort care status    Intracerebral hemorrhage: Left thalamic hemorrhage, 17 mm in greatest diameter.  Developed late 5/6 with apparent new right facial droop.  --Comfort care status.     Left foot ulcers  - [facility] consult appreciated    Sinus tachycardia: Resolved.  Suspect this was related to beta-blocker withdrawal.    Rhabdomyolysis: CPK has been decreasing with IV fluids.  Resolved.     Hyponatremia: Was getting hypotonic fluids, this was iatrogenic to at least some extent.  Improved with normal saline.  We will stop checking given comfort care status.    Hypomagnesemia  --Improved with replacement protocol.     Type 2 diabetes mellitus  --Holding home metformin given comfort care status.     Hypertension  --Holding home oral medication     Diarrhea   Negative for C diff on treatment with antibiotics   given Immodium and improved  Rectal tube out.  Overall improving, likely t related to antibiotics which were stopped 5/5.    Hypercholesteremia  -continue atorvastatin at home     Dementia  Family had been working on getting patients into a memory care unit     DNR/DNI     Diet: eat for pleasure, would use soft/pureed foods.  Code Status: No CPR- Do NOT Intubate, comfort care status.  Dispo: hospice enrollment and discharge to long term care facility once placement is found.         Interval History   Awake, taking some oral intake with assistance  Said a few words to family  I called Sanaz for an update    Constitutional: Arouses but looks uncomfortable.  Right facial droop.  Gaze mostly deviated to the left.  Respiratory: Clear to auscultation bilaterally  Cardiovascular: Regular rate and rhythm  GI: Soft, nondistended.  Skin/Integumen: No rashes, no cyanosis, no edema  Other:  left lateral foot wounds    Medications   All medications were reviewed.      acetaminophen  1,000 mg Oral BID     miconazole   Topical BID     sodium chloride (PF)  3 mL  Intracatheter Q8H        Data   Recent Labs   Lab 05/06/22  2057 05/06/22  1810 05/06/22  0829 05/06/22  0817 05/05/22  0911 05/05/22  0726 05/04/22  1659 05/04/22  1427 05/04/22  0803 05/04/22  0635 05/02/22  0913 05/02/22  0749   WBC  --   --   --   --   --   --   --   --   --  12.5*  --  13.6*   HGB  --   --   --   --   --   --   --   --   --  9.8*  --  9.2*   MCV  --   --   --   --   --   --   --   --   --  100  --  110*   PLT  --   --   --   --   --   --   --   --   --  391  391  --  302   NA  --   --   --  136  --  129*  --  127*  --  124*   < > 133   POTASSIUM  --   --   --  3.5  --  3.6  --   --   --  3.7   < > 3.8   CHLORIDE  --   --   --  106  --  102  --   --   --  95   < > 107   CO2  --   --   --  22  --  21  --   --   --  22   < > 21   BUN  --   --   --  7  --  9  --   --   --  7   < > 7   CR  --   --   --  0.75  --  0.89  --   --   --  0.90   < > 1.11*   ANIONGAP  --   --   --  8  --  6  --   --   --  7   < > 5   GWENDOLYN  --   --   --  8.4*  --  7.8*  --   --   --  8.3*   < > 8.1*   * 106* 103* 111*   < > 97   < >  --    < > 113*   < > 141*    < > = values in this interval not displayed.       No results found for this or any previous visit (from the past 24 hour(s)).    Olu Balderas MD

## 2022-05-08 NOTE — PLAN OF CARE
Goal Outcome Evaluation:    Pertinent assessments: [facility] cares [id number]. Pt lethargic, at times awake. Non-verbal. Slight grimacing when moved in bed, so prn rectal Tylenol administered with some improvement. Otherwise, appear comfortable, with no signs of respiratory distress. Patient on comfort cares. No vitals or blood sugars were checked. Patient noted to be scratching her legs, and took off a scab on R shin. Mepilex was placed to cover a small skin tear. Also patient moves in bed and at times puts legs against/through bed rails, so at risk for bruising or abrasions. Incontinent of urine. Incontinence cares provided with assist of 1-2.  Major Shift Events .  Treatment Plan: .  Bedside Nurse: Esperanza Perez RN

## 2022-05-09 NOTE — PROGRESS NOTES
Care Management Follow Up    Length of Stay (days): 13    Expected Discharge Date: 05/10/2022     Concerns to be Addressed: discharge planning     Patient plan of care discussed at interdisciplinary rounds: Yes    Anticipated Discharge Disposition: LTC with Hospice        Anticipated Discharge DME: oxygen      Referrals Placed by CM/SW:  yes  Private pay costs discussed: daughter aware of $12,000 deposit and transport cost     Additional Information:  Pt has been accepted for LTC for tomorrow. Hospice admission to be done at facility. [facility] able to admit    Stretcher transport set up for 11:001      Corinne C. White, LSW

## 2022-05-09 NOTE — PROGRESS NOTES
[facility]  Hospitalist Progress Note  Hank Zambrano MD  05/09/2022    Assessment & Plan   Nalini Sepulveda is a 86 year old lady with history of dementia, type 2 diabetes mellitus, hypertension, hypercholesteremia, sleep apnea, breast cancer s/p mastectomy came into [facility] 4/26/2022 with fall and altered mentation.  She came from home but her family was looking for a memory care unit.  Patient was diagnosed with rhabdomyolysis in the setting of mechanical fall with fluids at the CPK is decreasing from a peak of 2233.  There was some haziness seen in the chest x-ray was thought to be pneumonia with leukocytosis was started on azithromycin and ceftriaxone which were changed to Zosyn and vancomycin with which WBC count has been decreasing from a peak of 34.4.  Magnesium was low and has been replaced.      Patient's daughter has medical power of  and makes decisions on patient's behalf and the daughter stated that the patient is DNR/DNI.  She wanted care not to be escalated if patient's condition deteriorates.  Due to patient's poor participation swallowing evaluation was unable to be completed they have recommended soft and bite-size diet with thin liquids with the use of swallowing questions with assistance/supervision.       Overnight late 5/7 she developed focal neurologic deficits with facial droop.  Family was contacted and gave us direction that she should transition to comfort care status now but were okay with checking a CT head.  This showed a 12 x 17 x 16 mm acute intraparenchymal hemorrhage of the left lateral thalamus.  Seems to be stabilizing: non-verbal mostly but still saying a few words to family, but taking some oral intake.      Community-acquired vs Aspiration Pneumonia  Metabolic Encephalopathy with Acute Hypoactive Delirium on Dementia  Completed 9 days of zosyn.  Stopped 5/5.  -Now comfort care status    Intracerebral Hemorrhage: Left thalamic  hemorrhage, 17 mm in greatest diameter.  Developed late 5/6 with apparent new right facial droop.  -Comfort care status.  -Palliative Care Assisting     Left Foot Ulcers: WOC consult appreciated  Sinus Tachycardia, resolved: Suspect this was related to beta-blocker withdrawal.  Rhabdomyolysis, resolved: CPK has been decreasing with IV fluids.  Hyponatremia: Was getting hypotonic fluids, this was iatrogenic to at least some extent. Improved with NS.  Hypomagnesemia: Improved with replacement protocol.  Type 2 Diabetes Mellitus: Holding home metformin given comfort care status.  Hypertension: Holding home oral medication  Diarrhea: Negative for C diff on treatment with antibiotics. Given Immodium and improved  Hypercholesteremia: Holding statin due to comfort cares.  Dementia: Family had been working on getting patients into a memory care unit     Diet: eat for pleasure, would use soft/pureed foods.  Code Status: No CPR- Do NOT Intubate, comfort care status.  Dispo: hospice enrollment and discharge to long term care facility 5/10/2022         Interval History   Mostly nonverbal. Awake, but not answering questions.    Constitutional: Arouses; appears comfortable.  Right facial droop.  Gaze mostly deviated to the left.  Respiratory: Clear to auscultation bilaterally  Cardiovascular: Regular rate and rhythm  GI: Soft, nondistended.  Skin/Integumen: No rashes, no cyanosis, no edema  Other: Left lateral foot wounds    Medications   All medications were reviewed.      acetaminophen  1,000 mg Oral BID     miconazole   Topical BID        Data   Recent Labs   Lab 05/06/22  2057 05/06/22  1810 05/06/22  0829 05/06/22  0817 05/05/22  0911 05/05/22  0726 05/04/22  1659 05/04/22  1427 05/04/22  0803 05/04/22  0635   WBC  --   --   --   --   --   --   --   --   --  12.5*   HGB  --   --   --   --   --   --   --   --   --  9.8*   MCV  --   --   --   --   --   --   --   --   --  100   PLT  --   --   --   --   --   --   --   --   --   391  391   NA  --   --   --  136  --  129*  --  127*  --  124*   POTASSIUM  --   --   --  3.5  --  3.6  --   --   --  3.7   CHLORIDE  --   --   --  106  --  102  --   --   --  95   CO2  --   --   --  22  --  21  --   --   --  22   BUN  --   --   --  7  --  9  --   --   --  7   CR  --   --   --  0.75  --  0.89  --   --   --  0.90   ANIONGAP  --   --   --  8  --  6  --   --   --  7   GWENDOLYN  --   --   --  8.4*  --  7.8*  --   --   --  8.3*   * 106* 103* 111*   < > 97   < >  --    < > 113*    < > = values in this interval not displayed.       No results found for this or any previous visit (from the past 24 hour(s)).    Hank Zambrano MD

## 2022-05-09 NOTE — PROGRESS NOTES
[facility] Left Message Note  [facility] Med/Surg 5    Pt Nalini has transitioned to comfort care.  Consulted her RN, who reported that pt is lethargic and non-verbal.  Attempted to call pt's daughter Sanaz to orient her to [facility] and to assess needs.  Left message with contact information.    Plan:  Will attempt to reach out to family later in the week to assess needs.    Manish Kemp M.Div., [facility]  Staff   Phone [phone]

## 2022-05-09 NOTE — PROGRESS NOTES
Clinic Care Coordination Contact  Ambulatory Care Coordination to Inpatient Care Management   Hand-In Communication    Date:  May 9, 2022  Name: Nalini Sepulveda is enrolled in Ambulatory Care Coordination program and I am the Lead Care Coordinator.  CC Contact Information: Epic InRyleyet + Teams   Della Sullivan   Payor Source: Payor: MEDICARE / Plan: MEDICARE / Product Type: Medicare /   Current services in place:     Please see the CC Snaphot and Care Management Flowsheets for specific  details of this Nalini Sepulveda care plan.   Additional details/specific concerns r/t this admission:     Assessment and Plan:   Altered mental status:  Patient presents with confusion on top of her underlying dementia.  Family reports that she is not quite herself.  She is a poor historian and unable to provide much history to me.  The family is reportedly working on getting her into her memory care unit but there is no clear timeline for this.  Work-up was mostly notable for rhabdomyolysis, leukocytosis.  Work-up for infection is largely unremarkable apart from some hazy opacities in the lung.  I do wonder if she has a mild pneumonia causing altered mentation.  -We will start ceftriaxone and azithromycin  -Blood cultures/urine cultures  -We will obtain LFTs, TSH, ammonia  -Delirium precaution  -Social work, PT, OT     Mechanical fall:  Patient uses a walker at baseline and is independent at home otherwise.  She reportedly has had 3 falls in the past couple of days which is unusual for her. Most fall seem to be in the setting of getting tangled up in her walker. Family already working on getting in her memory care unit.    -PT/OT     Leukocytosis:  Significant leukocytosis to 34.4 with left shift.  Etiology of this is unclear.  She is afebrile, normotensive.  She has no other signs of sepsis.  There is no evidence for urinary tract infection.  Chest x-ray is with some hazy opacities in the lung however the patient does not  obviously have respiratory failure cough.  She does have some crackles in her left lung.  Some of this may be stress response in the setting of rhabdomyolysis and fall.  -Given frailty we will opt to treat with ceftriaxone and azithromycin for coverage of CAP  -Repeat CBC in the morning     Rhabdomyolysis:  CK elevated to greater than 1800.  This is in the setting of mechanical fall and being down for an unknown period of time.  Fortunately she does not have any obvious evidence of acute kidney injury.  -IV fluid resuscitation at 75 cc/h in this patient who is only 100 pounds     Hypomagnesemia:  Unclear etiology but I suspect that this is most likely related to poor p.o. intake in the setting of recent falls.  -Magnesium replacement protocol     T2DM  Hyperglycemia:  Hyperglycemia greater than 200 on presentation..  She takes metformin only prior to arrival.  -Hold metformin  -Medium dose sliding scale insulin to start     Hypertension:  Appears to take metoprolol prior to arrival.  -We will resume once med rec complete     Hyperlipidemia:  Appears to take atorvastatin prior to arrival.  -We will resume once med rec complete     Dementia: Noted, delirium protocol.  Family working on getting patient into memory care unit.     CC MUKUND reviewed chart and agree with discharge planning per previous conversations with pts daughter.     I will follow this admission in Epic. Please feel free to contact me with questions or for further collaboration in discharge planning.

## 2022-05-09 NOTE — PROGRESS NOTES
HOSPICE - [facility] can accept pt for SOC on 5/10. Plan is for pt to discharge from [facility] at 11am to [facility].  [facility] will see pt tomorrow afternoon and will reach out to pt daughter, Sanaz, with meeting time.   Please contact me if you have any questions or concerns r/t hospice.   Thank you  Kathy Corona  [facility]  [phone]

## 2022-05-09 NOTE — PLAN OF CARE
Goal Outcome Evaluation:    Pertinent assessments: [facility] cares [id number]. Pt lethargic, at times awake. Non-verbal. Slight grimacing when moved in bed, so prn rectal Tylenol administered with some improvement. Otherwise, appear comfortable, with no signs of respiratory distress. Patient on comfort cares. No vitals or blood sugars were checked. Low urine output. A smear of BM. Incontinence cares provided/repositioned with assist of 1 -2.  Major Shift Events .  Treatment Plan: .  Bedside Nurse: Esperanza Perez RN

## 2022-05-09 NOTE — PLAN OF CARE
Pertinent assessments: A&O at baseline, VSS, no indication of SOB, comfort care maintained. Pure wick in place. Repositioning completed as needed.   Major Shift Events . Uneventful.   Treatment Plan: comfort cares, repositioning every 2 hours.

## 2022-05-10 NOTE — PROGRESS NOTES
Clinic Care Coordination Contact    Situation: Patient chart reviewed by care coordinator.    Background: Pt originally enrolled in Care Coordination for help at home resources    Assessment: Pt discharging today from [facility] to [facility] on hospice.    Plan/Recommendations: Pt no longer a candidate for clinic care coordination due to higher level of care.

## 2022-05-10 NOTE — PROGRESS NOTES
Pertinent assessments: pt. Open eyes to stimulation, none verbal, reposition Q2h, appears comfortable and sleeping. No PO intake, puewick in placed. Had one episode of BM.    Major Shift Events . Uneventful.   Treatment Plan: comfort cares, repositioning every 2 hours.

## 2022-05-10 NOTE — PROGRESS NOTES
Patient had uneventful night. Continues to be on  comfort cares. Repo every 2 hours. Had BM this shift. Purewick in place. Discharging today @ 11 a.m. Will continue to monitor.

## 2022-05-10 NOTE — PROGRESS NOTES
Clinical Nutrition Brief Note    Patient due for a follow up assessment however note that they are transitioning to comfort cares. Will sign off at this time.     Nakita Celestin RD, LD  Clinical Dietitian

## 2022-05-10 NOTE — PLAN OF CARE
Occupational Therapy Discharge Summary    Reason for therapy discharge:    Discharged to LTC on hospice care    Progress towards therapy goal(s). See goals on Care Plan in Epic electronic health record for goal details.  Goals not met.  Barriers to achieving goals:   discharge from facility.    Therapy recommendation(s):    No further therapy is recommended.

## 2022-05-10 NOTE — PROGRESS NOTES
Care Management Discharge Note    Discharge Date: 05/10/2022       Discharge Disposition: Skilled Nursing Facility    Discharge Services:Hospice     Discharge DME: Oxygen    Discharge Transportation: stretcher due to Comfort Care    Private pay costs discussed: transportation costs    PAS Confirmation Code: [id number] (5/9/22)  Patient/family educated on Medicare website which has current facility and service quality ratings: yes    Education Provided on the Discharge Plan: yes    Persons Notified of Discharge Plans: daughter   Patient/Family in Agreement with the Plan: yes    Handoff Referral Completed: Yes    Additional Information:  Spoke with Rasheeda in admissions. Final orders faxed   [facility] to admit at 1300         Corinne C. White, LSW

## 2022-05-10 NOTE — PLAN OF CARE
Pt. A&O at baseline, VSS, no indications of SOB, , pain noted, repositioning completed every 2 hours, comfort cares maintained.  All belongings sent with pt. All medication sent with EMTs, pt. Transferred to LTC with hospice care at 1115.

## 2022-05-11 NOTE — PROGRESS NOTES
[facility] GERIATRIC SERVICES    Facility:   [facility] () [[id number]]   Code Status: DNR/DNI      HPI:   Nalini is a 86 year old female who was hospitalized April 26, 2022 through May 10, 2022 secondary to altered mental status and a fall with confusion and underlying dementia.  At that time the family was working on getting her into a memory care unit.  While at home she is independent able to use a walker.  She has had 3 falls in the past few days.  Her work-up did include a CT of the head which did not show any acute intracranial injury.  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  The CT of the cervical spine was normal alignment no acute cervical spinal fracture and the chest x-ray did show lungs are clear does have a history of left shoulder arthroplasty as well as scoliosis and degenerative changes of the spine.  The white cells were at 34 hemoglobin 12.0 and platelets 333.  The sodium 136, potassium 4.0, BUN 17, creatinine 0.96.  The CPK peaked at 2233.  The magnesium was low and replaced.  She was diagnosed with a community-acquired pneumonia versus aspiration pneumonia did complete 9 days of Zosyn.  She does have a history of chronic left foot ulcers and the rhabdomyolysis did resolve with IV fluids.  The hyponatremia did improve with saline.  She does have a history of diabetes and was given metformin at home that has now been discontinued.  The antihypertensives were held and the statin was also discontinued.  She was discharged to long-term care with hospice care.  She is now in long-term care under hospice room 4005.  Had a chance to talk to her.  She does have cognitive impairment.  She is on a puréed texture with thin liquids.  Needing assistance with all ADLs.  Getting snacks 3 times daily.  The Haldol and morphine and Tylenol along with the Zofran lorazepam and Risperdal are all as needed.   Her systolic blood pressures have ranged anywhere from 133-160.  She does appear to be comfortable.  No respiratory issues is on room air.  Afebrile.  Past Medical History:  Past Medical History:   Diagnosis Date     Adenocarcinoma of breast (H)     left     Allergic rhinitis      Arthritis      Benign adenomatous polyp of large intestine      Breast cancer (H) 2006    Left side     Dementia without behavioral disturbance (H) 1/11/2022     Diabetes (H)      Hx of radiation therapy 2006     Hyperlipidemia      Hypertension      Menopause      S/P mastectomy 12/5/2015     Sleep apnea 6/9/2009           Surgical History:  Past Surgical History:   Procedure Laterality Date     BIOPSY BREAST Left 2006     HYSTERECTOMY  1994     IR LUMBAR EPIDURAL STEROID INJECTION  3/4/2002     LUMPECTOMY BREAST Left 2006     MASTECTOMY Left 12/2015     VA EXCIS BARTHOLIN GLAND/CYST      Description: Excision Of Bartholin's Gland Or Cyst;  Recorded: 07/29/2008;     VA INCISE VESTIBULAR NERVE,TRANSLABYR Left 12/4/2015    Procedure: LEFT LATISSIMUS DORSI FLAP BREAST RECONSTRUCTION WITH GEL IMPLANT;  Surgeon: Harley Abernathy MD;  Location: [facility];  Service:      VA MASTECTOMY, MODIFIED RADICAL Left 12/4/2015    Procedure: LEFT BREAST MASTECTOMY;  Surgeon: Jeferson Rodriguez MD;  Location: [facility] OR;  Service: General     ZZ TOTAL ABDOM HYSTERECTOMY      Description: Total Abdominal Hysterectomy;  Recorded: 12/30/2009;  Comments: BSO       Family History:   Family History   Problem Relation Age of Onset     Depression Mother      Diabetes Mother      Heart Disease Mother      Hypertension Mother      Cerebrovascular Disease Father      Cancer Maternal Grandmother         Thinks it was stomach cancer, but not sure.     Cancer Paternal Aunt 60.00        Thinks it was stomach cancer     Diabetes Sister      Heart Disease Sister        Social History:    Social History     Socioeconomic History     Marital status:       Number of children: 3   Tobacco Use     Smoking status: Never Smoker     Smokeless tobacco: Never Used   Substance and Sexual Activity     Alcohol use: No     Drug use: No     Sexual activity: Not Currently   Social History Narrative    Daughter thinks that she was checked before with chickenpox states that she no longer drives.  She reports that [place] is [place] not been out there for nearly 20 years.  She reports that she enjoyed living in [place] taking the train to [facility].  S he reports that she would like to exercise and go to the [facility].    Patient lives in a house with her dog [son owns the house].  Twice a day, with some delivers her pills and make sure is that she takes them.  He also make sure that the dog is being taken  care of.  Her daughter is also: Check.  There are cameras in the house,, for safety as well.    Betsy Mullen MD  6/27/2021         Social Determinants of Health     Financial Resource Strain: Low Risk      Difficulty of Paying Living Expenses: Not very hard   Food Insecurity: No Food Insecurity     Worried About Running Out of Food in the Last Year: Never true     Ran Out of Food in the Last Year: Never true   Transportation Needs: No Transportation Needs     Lack of Transportation (Medical): No     Lack of Transportation (Non-Medical): No   Physical Activity: Inactive     Days of Exercise per Week: 0 days     Minutes of Exercise per Session: 0 min   Stress: No Stress Concern Present     Feeling of Stress : Not at all   Social Connections: Socially Isolated     Frequency of Communication with Friends and Family: More than three times a week     Frequency of Social Gatherings with Friends and Family: Never     Attends [employer] Services: Never     Active Member of Clubs or Organizations: No     Attends Club or Organization Meetings: Never     Marital Status:    Intimate Partner Violence: Not At  Risk     Fear of Current or Ex-Partner: No     Emotionally Abused: No     Physically Abused: No     Sexually Abused: No   Housing Stability: Low Risk      Unable to Pay for Housing in the Last Year: No     Number of Places Lived in the Last Year: 1     Unstable Housing in the Last Year: No        REVIEW OF SYSTEM:  Per staff there have been no new symptoms of colds flus chills coughing wheezing dyspnea nausea vomiting diarrhea dysuria frequency urgency or unusual pain.    PHYSICAL EXAM:   Pleasant female in no acute distress.  Cognitively impaired.  Head is normocephalic.  Neck is supple without adenopathy.  Lung sounds are clear throughout.  Cardiovascular S1-S2 regular rate no lower extremity edema.  Gastrointestinal soft and nontender.  Musculoskeletal unable to obtain due to dementia as well is being frail.  History of mastectomy.  Psychiatric Pleasant but cognitively impaired.    Vitals:    05/11/22 1009   BP: (!) 160/84   Pulse: 116   Resp: 18   Temp: 98  F (36.7  C)   SpO2: 96%         Medication List:  Current Outpatient Medications   Medication Sig     acetaminophen (TYLENOL) 325 MG/10.15ML solution Take 20.3 mLs (650 mg) by mouth every 6 hours as needed for mild pain or other (and adjunct with moderate or severe pain or per patient request)     acetaminophen (TYLENOL) 650 MG suppository Place 1 suppository (650 mg) rectally every 6 hours as needed for mild pain or other (and adjunct with moderate or severe pain or per patient request (use if patient cannot swallow oral solution of acetaminophen))     atropine 1 % ophthalmic solution Place 2 drops under the tongue every 4 hours as needed for secretions     bisacodyl (DULCOLAX) 10 MG suppository Place 1 suppository (10 mg) rectally once as needed for other (for no bowel movement for 72 hours)     carboxymethylcellulose PF (REFRESH PLUS) 0.5 % ophthalmic solution Place 1-2 drops into both eyes every hour as needed for dry eyes     loperamide (IMODIUM) 2 MG  capsule Take 1 capsule (2 mg) by mouth 4 times daily as needed for diarrhea     LORazepam (ATIVAN) 1 MG tablet Place 1 tablet (1 mg) under the tongue every 3 hours as needed for anxiety     ondansetron (ZOFRAN ODT) 4 MG ODT tab Take 1 tablet (4 mg) by mouth every 6 hours as needed for nausea or vomiting     risperiDONE (RISPERDAL M-TABS) 0.5 MG ODT Place 1 tablet (0.5 mg) under the tongue 2 times daily as needed (agitation/impulsivity leading to concerns for patient safety)     No current facility-administered medications for this visit.        Labs:  CBC RESULTS: Recent Labs   Lab Test 05/04/22  0635   WBC 12.5*   RBC 2.90*   HGB 9.8*   HCT 28.9*      MCH 33.8*   MCHC 33.9   RDW 11.0     391     Last Comprehensive Metabolic Panel:  Sodium   Date Value Ref Range Status   05/06/2022 136 133 - 144 mmol/L Final     Potassium   Date Value Ref Range Status   05/06/2022 3.5 3.4 - 5.3 mmol/L Final     Chloride   Date Value Ref Range Status   05/06/2022 106 94 - 109 mmol/L Final     Carbon Dioxide (CO2)   Date Value Ref Range Status   05/06/2022 22 20 - 32 mmol/L Final     Anion Gap   Date Value Ref Range Status   05/06/2022 8 3 - 14 mmol/L Final     Glucose   Date Value Ref Range Status   05/06/2022 111 (H) 70 - 99 mg/dL Final     GLUCOSE BY METER POCT   Date Value Ref Range Status   05/06/2022 190 (H) 70 - 99 mg/dL Final     Urea Nitrogen   Date Value Ref Range Status   05/06/2022 7 7 - 30 mg/dL Final     Creatinine   Date Value Ref Range Status   05/06/2022 0.75 0.52 - 1.04 mg/dL Final     GFR Estimate   Date Value Ref Range Status   05/06/2022 78 >60 mL/min/1.73m2 Final     Comment:     Effective December 21, 2021 eGFRcr in adults is calculated using the 2021 CKD-EPI creatinine equation which includes age and gender (Trina harp al., NEJM, DOI: 10.1056/VKXNyx2208997)   06/02/2021 >60 >60 mL/min/1.73m2 Final     Calcium   Date Value Ref Range Status   05/06/2022 8.4 (L) 8.5 - 10.1 mg/dL Final     Bilirubin  Total   Date Value Ref Range Status   04/26/2022 0.7 0.2 - 1.3 mg/dL Final     Alkaline Phosphatase   Date Value Ref Range Status   04/26/2022 67 40 - 150 U/L Final     ALT   Date Value Ref Range Status   04/26/2022 40 0 - 50 U/L Final     AST   Date Value Ref Range Status   04/26/2022 84 (H) 0 - 45 U/L Final             Lab Results   Component Value Date    A1C 6.6 12/01/2021    A1C 6.6 10/20/2020    A1C 6.6 02/27/2020    A1C 7.0 11/22/2019    A1C 8.8 02/12/2019     Recent Labs   Lab Test 12/01/21  1235 02/27/20  1148 02/12/19  1149 08/17/16  1228   CHOL  --  119  --  110   HDL  --  37*  --  42*   LDL 37 56   < > 57   TRIG  --  131  --  54    < > = values in this interval not displayed.     Last Comprehensive Metabolic Panel:  Sodium   Date Value Ref Range Status   05/06/2022 136 133 - 144 mmol/L Final     Potassium   Date Value Ref Range Status   05/06/2022 3.5 3.4 - 5.3 mmol/L Final     Chloride   Date Value Ref Range Status   05/06/2022 106 94 - 109 mmol/L Final     Carbon Dioxide (CO2)   Date Value Ref Range Status   05/06/2022 22 20 - 32 mmol/L Final     Anion Gap   Date Value Ref Range Status   05/06/2022 8 3 - 14 mmol/L Final     Glucose   Date Value Ref Range Status   05/06/2022 111 (H) 70 - 99 mg/dL Final     GLUCOSE BY METER POCT   Date Value Ref Range Status   05/06/2022 190 (H) 70 - 99 mg/dL Final     Urea Nitrogen   Date Value Ref Range Status   05/06/2022 7 7 - 30 mg/dL Final     Creatinine   Date Value Ref Range Status   05/06/2022 0.75 0.52 - 1.04 mg/dL Final     GFR Estimate   Date Value Ref Range Status   05/06/2022 78 >60 mL/min/1.73m2 Final     Comment:     Effective December 21, 2021 eGFRcr in adults is calculated using the 2021 CKD-EPI creatinine equation which includes age and gender (Trina harp al., NEJM, DOI: 10.1056/DXNUxq3393211)   06/02/2021 >60 >60 mL/min/1.73m2 Final     Calcium   Date Value Ref Range Status   05/06/2022 8.4 (L) 8.5 - 10.1 mg/dL Final     Bilirubin Total   Date Value Ref  Range Status   04/26/2022 0.7 0.2 - 1.3 mg/dL Final     Alkaline Phosphatase   Date Value Ref Range Status   04/26/2022 67 40 - 150 U/L Final     ALT   Date Value Ref Range Status   04/26/2022 40 0 - 50 U/L Final     AST   Date Value Ref Range Status   04/26/2022 84 (H) 0 - 45 U/L Final                 Assessment:   (F03.90) Dementia without behavioral disturbance, unspecified dementia type (H)  (primary encounter diagnosis)  Comment: Stable  Plan: Pleasant    (R62.7) Adult failure to thrive  Comment: On hospice  Plan: We will assist with managing with hospice    (R13.10) Dysphagia, unspecified type  Comment: Puréed texture  Plan: Needs assistance    (F41.1) FRANSISCO (generalized anxiety disorder)  Comment: Does have lorazepam and Risperdal as needed  Plan: Continue to follow      PLAN:    She is currently on hospice team.  The staff have been getting to know her.  They will keep me updated for any problems or other concerns at this point her medications are as needed.  Staff did not have any other questions.      Electronically signed by: Jeferson Silva NP

## 2022-05-11 NOTE — LETTER
5/11/2022        RE: Nalini Sepulveda  [address] [place] [address]        M [facility] GERIATRIC SERVICES    Facility:   [facility] () [[id number]]   Code Status: DNR/DNI      HPI:   Nalini is a 86 year old female who was hospitalized April 26, 2022 through May 10, 2022 secondary to altered mental status and a fall with confusion and underlying dementia.  At that time the family was working on getting her into a memory care unit.  While at home she is independent able to use a walker.  She has had 3 falls in the past few days.  Her work-up did include a CT of the head which did not show any acute intracranial injury.  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  The CT of the cervical spine was normal alignment no acute cervical spinal fracture and the chest x-ray did show lungs are clear does have a history of left shoulder arthroplasty as well as scoliosis and degenerative changes of the spine.  The white cells were at 34 hemoglobin 12.0 and platelets 333.  The sodium 136, potassium 4.0, BUN 17, creatinine 0.96.  The CPK peaked at 2233.  The magnesium was low and replaced.  She was diagnosed with a community-acquired pneumonia versus aspiration pneumonia did complete 9 days of Zosyn.  She does have a history of chronic left foot ulcers and the rhabdomyolysis did resolve with IV fluids.  The hyponatremia did improve with saline.  She does have a history of diabetes and was given metformin at home that has now been discontinued.  The antihypertensives were held and the statin was also discontinued.  She was discharged to long-term care with hospice care.  She is now in long-term care under hospice room 4005.  Had a chance to talk to her.  She does have cognitive impairment.  She is on a puréed texture with thin liquids.  Needing assistance with all ADLs.  Getting snacks 3 times daily.  The Haldol  and morphine and Tylenol along with the Zofran lorazepam and Risperdal are all as needed.  Her systolic blood pressures have ranged anywhere from 133-160.  She does appear to be comfortable.  No respiratory issues is on room air.  Afebrile.  Past Medical History:  Past Medical History:   Diagnosis Date     Adenocarcinoma of breast (H)     left     Allergic rhinitis      Arthritis      Benign adenomatous polyp of large intestine      Breast cancer (H) 2006    Left side     Dementia without behavioral disturbance (H) 1/11/2022     Diabetes (H)      Hx of radiation therapy 2006     Hyperlipidemia      Hypertension      Menopause      S/P mastectomy 12/5/2015     Sleep apnea 6/9/2009           Surgical History:  Past Surgical History:   Procedure Laterality Date     BIOPSY BREAST Left 2006     HYSTERECTOMY  1994     IR LUMBAR EPIDURAL STEROID INJECTION  3/4/2002     LUMPECTOMY BREAST Left 2006     MASTECTOMY Left 12/2015     NM EXCIS BARTHOLIN GLAND/CYST      Description: Excision Of Bartholin's Gland Or Cyst;  Recorded: 07/29/2008;     NM INCISE VESTIBULAR NERVE,TRANSLABYR Left 12/4/2015    Procedure: LEFT LATISSIMUS DORSI FLAP BREAST RECONSTRUCTION WITH GEL IMPLANT;  Surgeon: Harley Abernathy MD;  Location: [facility];  Service:      NM MASTECTOMY, MODIFIED RADICAL Left 12/4/2015    Procedure: LEFT BREAST MASTECTOMY;  Surgeon: Jeferson Rodriguez MD;  Location: [facility];  Service: General     [facility] TOTAL ABDOM HYSTERECTOMY      Description: Total Abdominal Hysterectomy;  Recorded: 12/30/2009;  Comments: BSO       Family History:   Family History   Problem Relation Age of Onset     Depression Mother      Diabetes Mother      Heart Disease Mother      Hypertension Mother      Cerebrovascular Disease Father      Cancer Maternal Grandmother         Thinks it was stomach cancer, but not sure.     Cancer Paternal Aunt 60.00        Thinks it was stomach cancer     Diabetes Sister      Heart Disease  Sister        Social History:    Social History     Socioeconomic History     Marital status:      Number of children: 3   Tobacco Use     Smoking status: Never Smoker     Smokeless tobacco: Never Used   Substance and Sexual Activity     Alcohol use: No     Drug use: No     Sexual activity: Not Currently   Social History Narrative    Daughter thinks that she was checked before with chickenpox states that she no longer drives.  She reports that [place] is originally [place] has not been out there for nearly 20 years.  She reports that she enjoyed living in [place] taking the train to [facility].  S he reports that she would like to exercise and go to the [facility].    Patient lives in a house with her dog [son owns the house].  Twice a day, with some delivers her pills and make sure is that she takes them.  He also make sure that the dog is being taken  care of.  Her daughter is also: Check.  There are cameras in the house,, for safety as well.    Betsy Mullen MD  6/27/2021         Social Determinants of Health     Financial Resource Strain: Low Risk      Difficulty of Paying Living Expenses: Not very hard   Food Insecurity: No Food Insecurity     Worried About Running Out of Food in the Last Year: Never true     Ran Out of Food in the Last Year: Never true   Transportation Needs: No Transportation Needs     Lack of Transportation (Medical): No     Lack of Transportation (Non-Medical): No   Physical Activity: Inactive     Days of Exercise per Week: 0 days     Minutes of Exercise per Session: 0 min   Stress: No Stress Concern Present     Feeling of Stress : Not at all   Social Connections: Socially Isolated     Frequency of Communication with Friends and Family: More than three times a week     Frequency of Social Gatherings with Friends and Family: Never     Attends Hoahaoism Services: Never     Active Member of Clubs or Organizations: No     Attends Club or  Organization Meetings: Never     Marital Status:    Intimate Partner Violence: Not At Risk     Fear of Current or Ex-Partner: No     Emotionally Abused: No     Physically Abused: No     Sexually Abused: No   Housing Stability: Low Risk      Unable to Pay for Housing in the Last Year: No     Number of Places Lived in the Last Year: 1     Unstable Housing in the Last Year: No        REVIEW OF SYSTEM:  Per staff there have been no new symptoms of colds flus chills coughing wheezing dyspnea nausea vomiting diarrhea dysuria frequency urgency or unusual pain.    PHYSICAL EXAM:   Pleasant female in no acute distress.  Cognitively impaired.  Head is normocephalic.  Neck is supple without adenopathy.  Lung sounds are clear throughout.  Cardiovascular S1-S2 regular rate no lower extremity edema.  Gastrointestinal soft and nontender.  Musculoskeletal unable to obtain due to dementia as well is being frail.  History of mastectomy.  Psychiatric Pleasant but cognitively impaired.    Vitals:    05/11/22 1009   BP: (!) 160/84   Pulse: 116   Resp: 18   Temp: 98  F (36.7  C)   SpO2: 96%         Medication List:  Current Outpatient Medications   Medication Sig     acetaminophen (TYLENOL) 325 MG/10.15ML solution Take 20.3 mLs (650 mg) by mouth every 6 hours as needed for mild pain or other (and adjunct with moderate or severe pain or per patient request)     acetaminophen (TYLENOL) 650 MG suppository Place 1 suppository (650 mg) rectally every 6 hours as needed for mild pain or other (and adjunct with moderate or severe pain or per patient request (use if patient cannot swallow oral solution of acetaminophen))     atropine 1 % ophthalmic solution Place 2 drops under the tongue every 4 hours as needed for secretions     bisacodyl (DULCOLAX) 10 MG suppository Place 1 suppository (10 mg) rectally once as needed for other (for no bowel movement for 72 hours)     carboxymethylcellulose PF (REFRESH PLUS) 0.5 % ophthalmic solution  Place 1-2 drops into both eyes every hour as needed for dry eyes     loperamide (IMODIUM) 2 MG capsule Take 1 capsule (2 mg) by mouth 4 times daily as needed for diarrhea     LORazepam (ATIVAN) 1 MG tablet Place 1 tablet (1 mg) under the tongue every 3 hours as needed for anxiety     ondansetron (ZOFRAN ODT) 4 MG ODT tab Take 1 tablet (4 mg) by mouth every 6 hours as needed for nausea or vomiting     risperiDONE (RISPERDAL M-TABS) 0.5 MG ODT Place 1 tablet (0.5 mg) under the tongue 2 times daily as needed (agitation/impulsivity leading to concerns for patient safety)     No current facility-administered medications for this visit.        Labs:  CBC RESULTS: Recent Labs   Lab Test 05/04/22  0635   WBC 12.5*   RBC 2.90*   HGB 9.8*   HCT 28.9*      MCH 33.8*   MCHC 33.9   RDW 11.0     391     Last Comprehensive Metabolic Panel:  Sodium   Date Value Ref Range Status   05/06/2022 136 133 - 144 mmol/L Final     Potassium   Date Value Ref Range Status   05/06/2022 3.5 3.4 - 5.3 mmol/L Final     Chloride   Date Value Ref Range Status   05/06/2022 106 94 - 109 mmol/L Final     Carbon Dioxide (CO2)   Date Value Ref Range Status   05/06/2022 22 20 - 32 mmol/L Final     Anion Gap   Date Value Ref Range Status   05/06/2022 8 3 - 14 mmol/L Final     Glucose   Date Value Ref Range Status   05/06/2022 111 (H) 70 - 99 mg/dL Final     GLUCOSE BY METER POCT   Date Value Ref Range Status   05/06/2022 190 (H) 70 - 99 mg/dL Final     Urea Nitrogen   Date Value Ref Range Status   05/06/2022 7 7 - 30 mg/dL Final     Creatinine   Date Value Ref Range Status   05/06/2022 0.75 0.52 - 1.04 mg/dL Final     GFR Estimate   Date Value Ref Range Status   05/06/2022 78 >60 mL/min/1.73m2 Final     Comment:     Effective December 21, 2021 eGFRcr in adults is calculated using the 2021 CKD-EPI creatinine equation which includes age and gender (Trina harp al., NEJM, DOI: 10.1056/FWBVgd8455978)   06/02/2021 >60 >60 mL/min/1.73m2 Final      Calcium   Date Value Ref Range Status   05/06/2022 8.4 (L) 8.5 - 10.1 mg/dL Final     Bilirubin Total   Date Value Ref Range Status   04/26/2022 0.7 0.2 - 1.3 mg/dL Final     Alkaline Phosphatase   Date Value Ref Range Status   04/26/2022 67 40 - 150 U/L Final     ALT   Date Value Ref Range Status   04/26/2022 40 0 - 50 U/L Final     AST   Date Value Ref Range Status   04/26/2022 84 (H) 0 - 45 U/L Final             Lab Results   Component Value Date    A1C 6.6 12/01/2021    A1C 6.6 10/20/2020    A1C 6.6 02/27/2020    A1C 7.0 11/22/2019    A1C 8.8 02/12/2019     Recent Labs   Lab Test 12/01/21  1235 02/27/20  1148 02/12/19  1149 08/17/16  1228   CHOL  --  119  --  110   HDL  --  37*  --  42*   LDL 37 56   < > 57   TRIG  --  131  --  54    < > = values in this interval not displayed.     Last Comprehensive Metabolic Panel:  Sodium   Date Value Ref Range Status   05/06/2022 136 133 - 144 mmol/L Final     Potassium   Date Value Ref Range Status   05/06/2022 3.5 3.4 - 5.3 mmol/L Final     Chloride   Date Value Ref Range Status   05/06/2022 106 94 - 109 mmol/L Final     Carbon Dioxide (CO2)   Date Value Ref Range Status   05/06/2022 22 20 - 32 mmol/L Final     Anion Gap   Date Value Ref Range Status   05/06/2022 8 3 - 14 mmol/L Final     Glucose   Date Value Ref Range Status   05/06/2022 111 (H) 70 - 99 mg/dL Final     GLUCOSE BY METER POCT   Date Value Ref Range Status   05/06/2022 190 (H) 70 - 99 mg/dL Final     Urea Nitrogen   Date Value Ref Range Status   05/06/2022 7 7 - 30 mg/dL Final     Creatinine   Date Value Ref Range Status   05/06/2022 0.75 0.52 - 1.04 mg/dL Final     GFR Estimate   Date Value Ref Range Status   05/06/2022 78 >60 mL/min/1.73m2 Final     Comment:     Effective December 21, 2021 eGFRcr in adults is calculated using the 2021 CKD-EPI creatinine equation which includes age and gender ( et al., NEJM, DOI: 10.1056/JRHDwa2346116)   06/02/2021 >60 >60 mL/min/1.73m2 Final     Calcium   Date  Value Ref Range Status   05/06/2022 8.4 (L) 8.5 - 10.1 mg/dL Final     Bilirubin Total   Date Value Ref Range Status   04/26/2022 0.7 0.2 - 1.3 mg/dL Final     Alkaline Phosphatase   Date Value Ref Range Status   04/26/2022 67 40 - 150 U/L Final     ALT   Date Value Ref Range Status   04/26/2022 40 0 - 50 U/L Final     AST   Date Value Ref Range Status   04/26/2022 84 (H) 0 - 45 U/L Final                 Assessment:   (F03.90) Dementia without behavioral disturbance, unspecified dementia type (H)  (primary encounter diagnosis)  Comment: Stable  Plan: Pleasant    (R62.7) Adult failure to thrive  Comment: On hospice  Plan: We will assist with managing with hospice    (R13.10) Dysphagia, unspecified type  Comment: Puréed texture  Plan: Needs assistance    (F41.1) FRANSISCO (generalized anxiety disorder)  Comment: Does have lorazepam and Risperdal as needed  Plan: Continue to follow      PLAN:    She is currently on hospice team.  The staff have been getting to know her.  They will keep me updated for any problems or other concerns at this point her medications are as needed.  Staff did not have any other questions.      Electronically signed by: Jeferson Silva NP          Sincerely,        Jeferson Silva NP

## 2022-05-15 NOTE — DISCHARGE SUMMARY
[facility]  Hospitalist Discharge Summary      Date of Admission:  4/26/2022  Date of Discharge:  5/10/2022 11:14 AM  Discharging Provider: Hank Zambrano MD  Discharge Service: Hospitalist Service    Discharge Diagnoses   Community-acquired vs Aspiration Pneumonia  Metabolic Encephalopathy with Acute Hypoactive Delirium on Dementia  Intracerebral Hemorrhage  Left Foot Ulcers  Rhabdomyolysis, resolved  Hyponatremia  Hypomagnesemia  Type 2 Diabetes Mellitus  Hypertension  Diarrhea  Hypercholesteremia  Dementia    Follow-ups Needed After Discharge   Follow-up Appointments     Follow Up and recommended labs and tests      Follow up with hospice nursing           Discharge Disposition   Discharged to home  Condition at discharge: Stable    Hospital Course   Nalini Sepulveda is a 86 year old lady with history of dementia, type 2 diabetes mellitus, hypertension, hypercholesteremia, sleep apnea, breast cancer s/p mastectomy came into [facility] 4/26/2022 with fall and altered mentation.  She came from home but her family was looking for a memory care unit.  Patient was diagnosed with rhabdomyolysis in the setting of mechanical fall with fluids at the CPK is decreasing from a peak of 2233.  There was some haziness seen in the chest x-ray was thought to be pneumonia with leukocytosis was started on azithromycin and ceftriaxone which were changed to Zosyn and vancomycin with which WBC count has been decreasing from a peak of 34.4.  Magnesium was low and has been replaced.       Patient's daughter has medical power of  and makes decisions on patient's behalf and the daughter stated that the patient is DNR/DNI.  She wanted care not to be escalated if patient's condition deteriorates.  Due to patient's poor participation swallowing evaluation was unable to be completed they have recommended soft and bite-size diet with thin liquids with the use of swallowing questions with  "assistance/supervision.        Overnight late 5/7 she developed focal neurologic deficits with facial droop.  Family was contacted and gave us direction that she should transition to comfort care status now but were okay with checking a CT head.  This showed a 12 x 17 x 16 mm acute intraparenchymal hemorrhage of the left lateral thalamus.  Seems to be stabilizing: non-verbal mostly but still saying a few words to family, but taking some oral intake.      Consultations This Hospital Stay   PHARMACY IP CONSULT  CARE MANAGEMENT / SOCIAL WORK IP CONSULT  PHARMACY TO DOSE VANCO  SPEECH LANGUAGE PATH ADULT IP CONSULT  PHYSICAL THERAPY ADULT IP CONSULT  OCCUPATIONAL THERAPY ADULT IP CONSULT  WOUND OSTOMY CONTINENCE NURSE  IP CONSULT  WOUND OSTOMY CONTINENCE NURSE  IP CONSULT  PALLIATIVE CARE ADULT IP CONSULT  CARE MANAGEMENT / SOCIAL WORK IP CONSULT  SOCIAL WORK IP CONSULT    Code Status   No CPR- Do NOT Intubate    Time Spent on this Encounter   I, Hank Zambrano MD, personally saw the patient today and spent greater than 30 minutes discharging this patient.       Hank Zambrano MD  [address] SURGICAL  [address] [place] [address]  Phone: [phone]  Fax: [phone]  ______________________________________________________________________    Physical Exam   BP (!) 190/88   Pulse 99   Temp 98.4  F (36.9  C) (Axillary)   Resp 16   Ht 1.499 m (4' 11\")   Wt 46.9 kg (103 lb 6.4 oz)   SpO2 97%   BMI 20.88 kg/m      Constitutional: Arouses; appears comfortable.  Right facial droop.  Gaze mostly deviated to the left.  Respiratory: Clear to auscultation bilaterally  Cardiovascular: Regular rate and rhythm  GI: Soft, nondistended.  Skin/Integumen: No rashes, no cyanosis, no edema  Other: Left lateral foot wounds       Primary Care Physician   Jeferson Silva    Discharge Orders      Primary Care - Care Coordination Referral      General info for SNF    Length of Stay Estimate: Short Term " Care: Estimated # of Days <30  Condition at Discharge: Terminal  Level of care:skilled   Rehabilitation Potential: Poor  Admission H&P remains valid and up-to-date: Yes  Recent Chemotherapy: N/A  Use Nursing Home Standing Orders: Yes     Mantoux instructions    Give two-step Mantoux (PPD) Per Facility Policy Yes     Reason for your hospital stay    You were in the hospital for a fall and confusion. While you were here you developed a brain bleed. You are being discharged with hospice and comfort cares and will follow up with hospice nursing.     Activity - Up with nursing assistance     Follow Up (Transitional Care Management)    Follow up with hospice nurse     Follow Up and recommended labs and tests    Follow up with hospice nursing     No CPR- Do NOT Intubate     Fall precautions     Diet    Follow this diet upon discharge: Orders Placed This Encounter      Snacks/Supplements Adult: Ensure Enlive; With Meals      Room Service      Combination Diet Pureed Diet (level 4); Thin Liquids (level 0)       Significant Results and Procedures   Most Recent 3 CBC's:Recent Labs   Lab Test 05/04/22  0635 05/02/22  0749 05/01/22  0547 04/30/22  0719   WBC 12.5* 13.6*  --  19.8*   HGB 9.8* 9.2*  --  9.5*    110*  --  105*     391 302 235 217     Most Recent 3 BMP's:Recent Labs   Lab Test 05/06/22  2057 05/06/22  1810 05/06/22  0829 05/06/22  0817 05/05/22  0911 05/05/22  0726 05/04/22  1659 05/04/22  1427 05/04/22  0803 05/04/22  0635   NA  --   --   --  136  --  129*  --  127*  --  124*   POTASSIUM  --   --   --  3.5  --  3.6  --   --   --  3.7   CHLORIDE  --   --   --  106  --  102  --   --   --  95   CO2  --   --   --  22  --  21  --   --   --  22   BUN  --   --   --  7  --  9  --   --   --  7   CR  --   --   --  0.75  --  0.89  --   --   --  0.90   ANIONGAP  --   --   --  8  --  6  --   --   --  7   GWENDOLYN  --   --   --  8.4*  --  7.8*  --   --   --  8.3*   * 106* 103* 111*   < > 97   < >  --    < > 113*     < > = values in this interval not displayed.     Most Recent 2 LFT's:Recent Labs   Lab Test 04/26/22 1956 01/11/22  1030   AST 84* 22   ALT 40 15   ALKPHOS 67 59   BILITOTAL 0.7 0.7   ,   Results for orders placed or performed during the hospital encounter of 04/26/22   XR Chest Port 1 View    Narrative    CHEST ONE VIEW PORTABLE   4/28/2022 9:46 AM     HISTORY: Shortness of breath    COMPARISON: 3/14/2019      Impression    IMPRESSION: Hazy opacity left lung base could represent developing  pneumonia or atelectasis. No pneumothorax or pleural effusion. Heart  size similar to prior. Left shoulder replacement.    JILLIAN HERNANDEZ MD         SYSTEM ID:  [id number]   US VALERIE Doppler No Exercise    Narrative    EXAM: RESTING ANKLE-BRACHIAL INDICES (ABIs)  LOCATION: [facility]  DATE/TIME: 5/3/2022 6:29 PM    INDICATION: Left foot wounds.  COMPARISON: None.    VALERIE FINDINGS:  RIGHT  Brachial: 175  Ankle (PT): 196 Index: 1.12  Ankle (DP): 188 Index: 1.07    LEFT  Brachial: 175, the right brachial value used as unable to obtain pressure in the left upper extremity due to limb alert band.  Ankle (PT): 214 Index: 1.22  Ankle (DP): 197 Index: 1.13    The right VALERIE at rest is 1.12. The left VALERIE at rest is 1.22.     WAVEFORMS: The dorsalis pedis and posterior tibial arteries are triphasic in both distal posterior tibial and dorsalis pedis arteries.      Impression    IMPRESSION:  1.  RIGHT LOWER EXTREMITY: VALERIE at rest is normal.  2.  LEFT LOWER EXTREMITY: VALERIE at rest is normal.   CT Head w/o Contrast     Value    Radiologist flags Acute hemorrhage in the left thalamus (AA)    Narrative    EXAM: CT HEAD W/O CONTRAST  LOCATION: [facility]  DATE/TIME: 5/6/2022 9:56 PM    INDICATION: Mental status change, unknown cause.  COMPARISON: 04/26/2022  TECHNIQUE: Routine CT Head without IV contrast. Multiplanar reformats. Dose reduction techniques were used.    FINDINGS:  INTRACRANIAL CONTENTS:  There is an area of acute intraparenchymal hemorrhage measuring 12 x 17 x 16 mm located along the lateral aspect of the left thalamus and the posterior limb of the left internal capsule. Small amount of surrounding edema. No   appreciable mass effect. No CT evidence of acute infarct. Moderate presumed chronic small vessel ischemic changes. Moderate generalized volume loss. No hydrocephalus.     VISUALIZED ORBITS/SINUSES/MASTOIDS: No intraorbital abnormality. No paranasal sinus mucosal disease. No middle ear or mastoid effusion.    BONES/SOFT TISSUES: No acute abnormality.      Impression    IMPRESSION:  1.  There is a 12 x 17 x 16 mm acute intraparenchymal hemorrhage involving the lateral left thalamus and posterior limb of the internal capsule. Mild surrounding edema without significant mass effect.  2.  Underlying moderate chronic small vessel ischemic changes and volume loss.      [Critical Result: Acute hemorrhage in the left thalamus]    Finding was identified on 5/6/2022 10:01 PM.     Dr Duarte was contacted by me on 5/6/2022 10:07 PM and verbalized understanding of the critical result.        Discharge Medications   Discharge Medication List as of 5/10/2022 10:44 AM      START taking these medications    Details   acetaminophen (TYLENOL) 325 MG/10.15ML solution Take 20.3 mLs (650 mg) by mouth every 6 hours as needed for mild pain or other (and adjunct with moderate or severe pain or per patient request), Disp-473 mL, R-0, E-Prescribe      acetaminophen (TYLENOL) 650 MG suppository Place 1 suppository (650 mg) rectally every 6 hours as needed for mild pain or other (and adjunct with moderate or severe pain or per patient request (use if patient cannot swallow oral solution of acetaminophen)), Disp-12 suppository, R-0, E-Prescribe      atropine 1 % ophthalmic solution Place 2 drops under the tongue every 4 hours as needed for secretions, Disp-15 mL, R-0, E-Prescribe      bisacodyl (DULCOLAX) 10 MG suppository  Place 1 suppository (10 mg) rectally once as needed for other (for no bowel movement for 72 hours), Disp-3 suppository, R-0, E-Prescribe      carboxymethylcellulose PF (REFRESH PLUS) 0.5 % ophthalmic solution Place 1-2 drops into both eyes every hour as needed for dry eyes, Disp-1 each, R-0, E-Prescribe      loperamide (IMODIUM) 2 MG capsule Take 1 capsule (2 mg) by mouth 4 times daily as needed for diarrhea, Disp-12 capsule, R-0, E-Prescribe      LORazepam (ATIVAN) 1 MG tablet Place 1 tablet (1 mg) under the tongue every 3 hours as needed for anxiety, Disp-24 tablet, R-0, Local Print      ondansetron (ZOFRAN ODT) 4 MG ODT tab Take 1 tablet (4 mg) by mouth every 6 hours as needed for nausea or vomiting, Disp-12 tablet, R-0, E-Prescribe      risperiDONE (RISPERDAL M-TABS) 0.5 MG ODT Place 1 tablet (0.5 mg) under the tongue 2 times daily as needed (agitation/impulsivity leading to concerns for patient safety), Disp-6 tablet, R-0, E-Prescribe         STOP taking these medications       acetaminophen (TYLENOL EXTRA STRENGTH) 500 MG tablet Comments:   Reason for Stopping:         atorvastatin (LIPITOR) 20 MG tablet Comments:   Reason for Stopping:         b complex vitamins tablet Comments:   Reason for Stopping:         BIOTIN ORAL Comments:   Reason for Stopping:         cholecalciferol, vitamin D3, 1,000 unit tablet Comments:   Reason for Stopping:         lisinopriL (PRINIVIL,ZESTRIL) 40 MG tablet Comments:   Reason for Stopping:         metFORMIN (GLUCOPHAGE) 500 MG tablet Comments:   Reason for Stopping:         metoprolol succinate (TOPROL-XL) 100 MG 24 hr tablet Comments:   Reason for Stopping:             Allergies   Allergies   Allergen Reactions     Codeine Hives and Itching

## 2022-05-26 ENCOUNTER — TELEPHONE (OUTPATIENT)
Dept: GERIATRICS | Facility: CLINIC | Age: 87
End: 2022-05-26
Payer: MEDICARE

## 2022-05-26 NOTE — TELEPHONE ENCOUNTER
Geriatrics Notification of Patient Death    Provider: JAC Miller  Place of death: [facility] Type:  [facility]  Date of Death: 5/24/22    Patient was on Hospice care: Yes  [facility] provider: Yes; please explain: 5/11/22 by Jeferson Louis RN

## 2022-06-03 ENCOUNTER — DOCUMENTATION ONLY (OUTPATIENT)
Dept: INTERNAL MEDICINE | Facility: CLINIC | Age: 87
End: 2022-06-03
Payer: MEDICARE

## 2022-06-03 NOTE — PROGRESS NOTES
Reviewed chart for insurance claim for coverage of home care/hospice through [employer]. Form faxed back to insurance.